# Patient Record
Sex: MALE | Race: WHITE | Employment: OTHER | URBAN - METROPOLITAN AREA
[De-identification: names, ages, dates, MRNs, and addresses within clinical notes are randomized per-mention and may not be internally consistent; named-entity substitution may affect disease eponyms.]

---

## 2017-09-12 ENCOUNTER — APPOINTMENT (OUTPATIENT)
Dept: CT IMAGING | Facility: HOSPITAL | Age: 62
DRG: 669 | End: 2017-09-12
Attending: EMERGENCY MEDICINE
Payer: COMMERCIAL

## 2017-09-12 ENCOUNTER — HOSPITAL ENCOUNTER (INPATIENT)
Facility: HOSPITAL | Age: 62
LOS: 2 days | Discharge: HOME | DRG: 669 | End: 2017-09-14
Attending: EMERGENCY MEDICINE | Admitting: HOSPITALIST
Payer: COMMERCIAL

## 2017-09-12 ENCOUNTER — APPOINTMENT (OUTPATIENT)
Dept: GENERAL RADIOLOGY | Facility: HOSPITAL | Age: 62
DRG: 669 | End: 2017-09-12
Attending: EMERGENCY MEDICINE
Payer: COMMERCIAL

## 2017-09-12 ENCOUNTER — NURSE TRIAGE (OUTPATIENT)
Dept: OTHER | Age: 62
End: 2017-09-12

## 2017-09-12 DIAGNOSIS — D49.4 BLADDER TUMOR: ICD-10-CM

## 2017-09-12 DIAGNOSIS — N20.1 OBSTRUCTION OF LEFT URETEROPELVIC JUNCTION (UPJ) DUE TO STONE: ICD-10-CM

## 2017-09-12 DIAGNOSIS — N13.30 HYDRONEPHROSIS, LEFT: ICD-10-CM

## 2017-09-12 DIAGNOSIS — N20.0 NEPHROLITHIASIS: Primary | ICD-10-CM

## 2017-09-12 LAB
ANION GAP SERPL CALC-SCNC: 11 MMOL/L (ref 0–18)
BACTERIA UR QL AUTO: NEGATIVE /HPF
BASOPHILS # BLD: 0.1 K/UL (ref 0–0.2)
BASOPHILS NFR BLD: 1 %
BILIRUB UR QL: NEGATIVE
BUN SERPL-MCNC: 13 MG/DL (ref 8–20)
BUN/CREAT SERPL: 16.3 (ref 10–20)
CALCIUM SERPL-MCNC: 9.1 MG/DL (ref 8.5–10.5)
CHLORIDE SERPL-SCNC: 102 MMOL/L (ref 95–110)
CO2 SERPL-SCNC: 26 MMOL/L (ref 22–32)
COLOR UR: YELLOW
CREAT SERPL-MCNC: 0.8 MG/DL (ref 0.5–1.5)
EOSINOPHIL # BLD: 0 K/UL (ref 0–0.7)
EOSINOPHIL NFR BLD: 0 %
ERYTHROCYTE [DISTWIDTH] IN BLOOD BY AUTOMATED COUNT: 13.1 % (ref 11–15)
GLUCOSE SERPL-MCNC: 109 MG/DL (ref 70–99)
GLUCOSE UR-MCNC: NEGATIVE MG/DL
HCT VFR BLD AUTO: 46.6 % (ref 41–52)
HGB BLD-MCNC: 15.5 G/DL (ref 13.5–17.5)
LEUKOCYTE ESTERASE UR QL STRIP.AUTO: NEGATIVE
LYMPHOCYTES # BLD: 1.6 K/UL (ref 1–4)
LYMPHOCYTES NFR BLD: 12 %
MCH RBC QN AUTO: 31.1 PG (ref 27–32)
MCHC RBC AUTO-ENTMCNC: 33.1 G/DL (ref 32–37)
MCV RBC AUTO: 93.8 FL (ref 80–100)
MONOCYTES # BLD: 0.7 K/UL (ref 0–1)
MONOCYTES NFR BLD: 5 %
NEUTROPHILS # BLD AUTO: 11 K/UL (ref 1.8–7.7)
NEUTROPHILS NFR BLD: 82 %
NITRITE UR QL STRIP.AUTO: NEGATIVE
OSMOLALITY UR CALC.SUM OF ELEC: 289 MOSM/KG (ref 275–295)
PH UR: 5 [PH] (ref 5–8)
PLATELET # BLD AUTO: 242 K/UL (ref 140–400)
PMV BLD AUTO: 8.3 FL (ref 7.4–10.3)
POTASSIUM SERPL-SCNC: 4.1 MMOL/L (ref 3.3–5.1)
PROT UR-MCNC: NEGATIVE MG/DL
RBC # BLD AUTO: 4.97 M/UL (ref 4.5–5.9)
RBC #/AREA URNS AUTO: 59 /HPF
SODIUM SERPL-SCNC: 139 MMOL/L (ref 136–144)
SP GR UR STRIP: 1.03 (ref 1–1.03)
URATE SERPL-MCNC: 5 MG/DL (ref 3.3–8.7)
UROBILINOGEN UR STRIP-ACNC: <2
VIT C UR-MCNC: NEGATIVE MG/DL
WBC # BLD AUTO: 13.5 K/UL (ref 4–11)
WBC #/AREA URNS AUTO: 2 /HPF

## 2017-09-12 PROCEDURE — 99223 1ST HOSP IP/OBS HIGH 75: CPT | Performed by: HOSPITALIST

## 2017-09-12 PROCEDURE — 74000 XR ABDOMEN (KUB) (1 AP VIEW)  (CPT=74000): CPT | Performed by: EMERGENCY MEDICINE

## 2017-09-12 PROCEDURE — 74177 CT ABD & PELVIS W/CONTRAST: CPT | Performed by: EMERGENCY MEDICINE

## 2017-09-12 RX ORDER — KETOROLAC TROMETHAMINE 15 MG/ML
15 INJECTION, SOLUTION INTRAMUSCULAR; INTRAVENOUS ONCE
Status: DISCONTINUED | OUTPATIENT
Start: 2017-09-12 | End: 2017-09-12

## 2017-09-12 RX ORDER — ACETAMINOPHEN 325 MG/1
650 TABLET ORAL EVERY 6 HOURS PRN
Status: DISCONTINUED | OUTPATIENT
Start: 2017-09-12 | End: 2017-09-14

## 2017-09-12 RX ORDER — SODIUM CHLORIDE 9 MG/ML
INJECTION, SOLUTION INTRAVENOUS CONTINUOUS
Status: ACTIVE | OUTPATIENT
Start: 2017-09-12 | End: 2017-09-13

## 2017-09-12 RX ORDER — KETOROLAC TROMETHAMINE 30 MG/ML
30 INJECTION, SOLUTION INTRAMUSCULAR; INTRAVENOUS EVERY 6 HOURS PRN
Status: DISCONTINUED | OUTPATIENT
Start: 2017-09-12 | End: 2017-09-14

## 2017-09-12 RX ORDER — KETOROLAC TROMETHAMINE 15 MG/ML
INJECTION, SOLUTION INTRAMUSCULAR; INTRAVENOUS
Status: COMPLETED
Start: 2017-09-12 | End: 2017-09-12

## 2017-09-12 RX ORDER — MORPHINE SULFATE 4 MG/ML
4 INJECTION, SOLUTION INTRAMUSCULAR; INTRAVENOUS EVERY 2 HOUR PRN
Status: DISCONTINUED | OUTPATIENT
Start: 2017-09-12 | End: 2017-09-14

## 2017-09-12 RX ORDER — SODIUM CHLORIDE 9 MG/ML
INJECTION, SOLUTION INTRAVENOUS CONTINUOUS
Status: DISCONTINUED | OUTPATIENT
Start: 2017-09-12 | End: 2017-09-14

## 2017-09-12 RX ORDER — KETOROLAC TROMETHAMINE 15 MG/ML
15 INJECTION, SOLUTION INTRAMUSCULAR; INTRAVENOUS ONCE
Status: COMPLETED | OUTPATIENT
Start: 2017-09-12 | End: 2017-09-12

## 2017-09-12 RX ORDER — ONDANSETRON 2 MG/ML
4 INJECTION INTRAMUSCULAR; INTRAVENOUS EVERY 4 HOURS PRN
Status: DISCONTINUED | OUTPATIENT
Start: 2017-09-12 | End: 2017-09-14

## 2017-09-12 RX ORDER — ONDANSETRON 2 MG/ML
4 INJECTION INTRAMUSCULAR; INTRAVENOUS EVERY 6 HOURS PRN
Status: DISCONTINUED | OUTPATIENT
Start: 2017-09-12 | End: 2017-09-14

## 2017-09-12 RX ORDER — MORPHINE SULFATE 4 MG/ML
4 INJECTION, SOLUTION INTRAMUSCULAR; INTRAVENOUS ONCE
Status: COMPLETED | OUTPATIENT
Start: 2017-09-12 | End: 2017-09-12

## 2017-09-12 RX ORDER — MORPHINE SULFATE 2 MG/ML
2 INJECTION, SOLUTION INTRAMUSCULAR; INTRAVENOUS EVERY 2 HOUR PRN
Status: DISCONTINUED | OUTPATIENT
Start: 2017-09-12 | End: 2017-09-14

## 2017-09-12 RX ORDER — ALFUZOSIN HYDROCHLORIDE 10 MG/1
10 TABLET, EXTENDED RELEASE ORAL
Status: DISCONTINUED | OUTPATIENT
Start: 2017-09-13 | End: 2017-09-14

## 2017-09-12 RX ORDER — HYDROMORPHONE HYDROCHLORIDE 1 MG/ML
0.5 INJECTION, SOLUTION INTRAMUSCULAR; INTRAVENOUS; SUBCUTANEOUS EVERY 30 MIN PRN
Status: ACTIVE | OUTPATIENT
Start: 2017-09-12 | End: 2017-09-13

## 2017-09-12 NOTE — ED PROVIDER NOTES
Patient Seen in: [facility] AND [facility] Emergency Department    History   Patient presents with:  Abdomen/Flank Pain (GI/)    Stated Complaint: abdominal pain     HPI    59 yo M with PMH pre-DM presenting with four days of initially intermittent and nearly re All other systems reviewed and negative except as noted above. PSFH elements reviewed from today and agreed except as otherwise stated in HPI.     Physical Exam   ED Triage Vitals [09/12/17 1656]  BP: 154/88  Pulse: 60  Resp: 16  Temp: 98.6 °F (37 °C GOLD   RAINBOW DRAW LAVENDER TALL (BNP)     Ct Abdomen Pelvis Iv Contrast, No Oral (er)    Result Date: 9/12/2017  PROCEDURE: CT ABDOMEN PELVIS IV CONTRAST NO ORAL (ER)  COMPARISON: None.   INDICATIONS: C/o L lower abdominal discomfort intermittent x4-5 day right pedicle of L5. LUNG BASES: There is mild bibasilar atelectasis.    Dictated by (CST): Tommy Mancilla MD on 9/12/2017 at 20:05     Approved by (CST): Tommy Mancilla MD on 9/12/2017 at 20:10          CONCLUSION: 1.3 cm left renal pelvis calculus with severe

## 2017-09-12 NOTE — TELEPHONE ENCOUNTER
Action Requested: Summary for Provider     []  Critical Lab, Recommendations Needed  [] Need Additional Advice  [x]   FYI    []   Need Orders  [] Need Medications Sent to Pharmacy  []  Other     SUMMARY: Sent to ER, because of acute left quadrant abdominal

## 2017-09-12 NOTE — ED INITIAL ASSESSMENT (HPI)
C/o L lower abdominal discomfort intermittent x4-5 days associated with generalized fatigue and irregular bowel movements, denies vomiting or fevers.

## 2017-09-13 ENCOUNTER — HOSPITAL ENCOUNTER (OUTPATIENT)
Dept: GENERAL RADIOLOGY | Facility: HOSPITAL | Age: 62
DRG: 669 | End: 2017-09-13
Attending: UROLOGY
Payer: COMMERCIAL

## 2017-09-13 ENCOUNTER — SURGERY (OUTPATIENT)
Age: 62
End: 2017-09-13

## 2017-09-13 ENCOUNTER — ANESTHESIA EVENT (OUTPATIENT)
Dept: SURGERY | Facility: HOSPITAL | Age: 62
DRG: 669 | End: 2017-09-13
Payer: COMMERCIAL

## 2017-09-13 ENCOUNTER — ANESTHESIA (OUTPATIENT)
Dept: SURGERY | Facility: HOSPITAL | Age: 62
DRG: 669 | End: 2017-09-13
Payer: COMMERCIAL

## 2017-09-13 LAB
ANION GAP SERPL CALC-SCNC: 8 MMOL/L (ref 0–18)
BUN SERPL-MCNC: 9 MG/DL (ref 8–20)
BUN/CREAT SERPL: 9.3 (ref 10–20)
CALCIUM SERPL-MCNC: 8.1 MG/DL (ref 8.5–10.5)
CHLORIDE SERPL-SCNC: 107 MMOL/L (ref 95–110)
CO2 SERPL-SCNC: 24 MMOL/L (ref 22–32)
CREAT SERPL-MCNC: 0.97 MG/DL (ref 0.5–1.5)
GLUCOSE SERPL-MCNC: 93 MG/DL (ref 70–99)
OSMOLALITY UR CALC.SUM OF ELEC: 286 MOSM/KG (ref 275–295)
POTASSIUM SERPL-SCNC: 3.5 MMOL/L (ref 3.3–5.1)
PSA SERPL-MCNC: 0.7 NG/ML (ref 0–4)
SODIUM SERPL-SCNC: 139 MMOL/L (ref 136–144)

## 2017-09-13 PROCEDURE — 0TBB8ZZ EXCISION OF BLADDER, VIA NATURAL OR ARTIFICIAL OPENING ENDOSCOPIC: ICD-10-PCS | Performed by: UROLOGY

## 2017-09-13 PROCEDURE — 52234 CYSTOSCOPY AND TREATMENT: CPT | Performed by: UROLOGY

## 2017-09-13 PROCEDURE — 0T7D8ZZ DILATION OF URETHRA, VIA NATURAL OR ARTIFICIAL OPENING ENDOSCOPIC: ICD-10-PCS | Performed by: UROLOGY

## 2017-09-13 PROCEDURE — 0T778DZ DILATION OF LEFT URETER WITH INTRALUMINAL DEVICE, VIA NATURAL OR ARTIFICIAL OPENING ENDOSCOPIC: ICD-10-PCS | Performed by: UROLOGY

## 2017-09-13 PROCEDURE — 52332 CYSTOSCOPY AND TREATMENT: CPT | Performed by: UROLOGY

## 2017-09-13 PROCEDURE — 0WHR8YZ INSERTION OF OTHER DEVICE INTO GENITOURINARY TRACT, VIA NATURAL OR ARTIFICIAL OPENING ENDOSCOPIC: ICD-10-PCS | Performed by: UROLOGY

## 2017-09-13 PROCEDURE — BT1B1ZZ FLUOROSCOPY OF BLADDER AND URETHRA USING LOW OSMOLAR CONTRAST: ICD-10-PCS | Performed by: UROLOGY

## 2017-09-13 PROCEDURE — 74420 UROGRAPHY RTRGR +-KUB: CPT | Performed by: UROLOGY

## 2017-09-13 PROCEDURE — 99233 SBSQ HOSP IP/OBS HIGH 50: CPT | Performed by: HOSPITALIST

## 2017-09-13 PROCEDURE — 99255 IP/OBS CONSLTJ NEW/EST HI 80: CPT | Performed by: UROLOGY

## 2017-09-13 DEVICE — STENT URET 6F 26CM WO GW INL: Type: IMPLANTABLE DEVICE | Site: URETER | Status: FUNCTIONAL

## 2017-09-13 RX ORDER — MAGNESIUM HYDROXIDE 1200 MG/15ML
3000 LIQUID ORAL CONTINUOUS
Status: CANCELLED | OUTPATIENT
Start: 2017-09-13

## 2017-09-13 RX ORDER — HALOPERIDOL 5 MG/ML
0.25 INJECTION INTRAMUSCULAR ONCE AS NEEDED
Status: DISCONTINUED | OUTPATIENT
Start: 2017-09-13 | End: 2017-09-13 | Stop reason: HOSPADM

## 2017-09-13 RX ORDER — ONDANSETRON 2 MG/ML
4 INJECTION INTRAMUSCULAR; INTRAVENOUS ONCE AS NEEDED
Status: DISCONTINUED | OUTPATIENT
Start: 2017-09-13 | End: 2017-09-13 | Stop reason: HOSPADM

## 2017-09-13 RX ORDER — LIDOCAINE HYDROCHLORIDE 10 MG/ML
INJECTION, SOLUTION EPIDURAL; INFILTRATION; INTRACAUDAL; PERINEURAL AS NEEDED
Status: DISCONTINUED | OUTPATIENT
Start: 2017-09-13 | End: 2017-09-13 | Stop reason: SURG

## 2017-09-13 RX ORDER — LIDOCAINE HYDROCHLORIDE 20 MG/ML
JELLY TOPICAL AS NEEDED
Status: DISCONTINUED | OUTPATIENT
Start: 2017-09-13 | End: 2017-09-13 | Stop reason: HOSPADM

## 2017-09-13 RX ORDER — ONDANSETRON 2 MG/ML
INJECTION INTRAMUSCULAR; INTRAVENOUS AS NEEDED
Status: DISCONTINUED | OUTPATIENT
Start: 2017-09-13 | End: 2017-09-13 | Stop reason: SURG

## 2017-09-13 RX ORDER — DEXAMETHASONE SODIUM PHOSPHATE 4 MG/ML
VIAL (ML) INJECTION AS NEEDED
Status: DISCONTINUED | OUTPATIENT
Start: 2017-09-13 | End: 2017-09-13 | Stop reason: SURG

## 2017-09-13 RX ORDER — NALOXONE HYDROCHLORIDE 0.4 MG/ML
80 INJECTION, SOLUTION INTRAMUSCULAR; INTRAVENOUS; SUBCUTANEOUS AS NEEDED
Status: DISCONTINUED | OUTPATIENT
Start: 2017-09-13 | End: 2017-09-13 | Stop reason: HOSPADM

## 2017-09-13 RX ORDER — HYDROCODONE BITARTRATE AND ACETAMINOPHEN 5; 325 MG/1; MG/1
2 TABLET ORAL AS NEEDED
Status: DISCONTINUED | OUTPATIENT
Start: 2017-09-13 | End: 2017-09-13 | Stop reason: HOSPADM

## 2017-09-13 RX ORDER — SODIUM CHLORIDE, SODIUM LACTATE, POTASSIUM CHLORIDE, CALCIUM CHLORIDE 600; 310; 30; 20 MG/100ML; MG/100ML; MG/100ML; MG/100ML
INJECTION, SOLUTION INTRAVENOUS CONTINUOUS
Status: DISCONTINUED | OUTPATIENT
Start: 2017-09-13 | End: 2017-09-14

## 2017-09-13 RX ORDER — GENTAMICIN SULFATE 40 MG/ML
INJECTION, SOLUTION INTRAMUSCULAR; INTRAVENOUS AS NEEDED
Status: DISCONTINUED | OUTPATIENT
Start: 2017-09-13 | End: 2017-09-13 | Stop reason: HOSPADM

## 2017-09-13 RX ORDER — CEFTRIAXONE 1 G/1
INJECTION, POWDER, FOR SOLUTION INTRAMUSCULAR; INTRAVENOUS AS NEEDED
Status: DISCONTINUED | OUTPATIENT
Start: 2017-09-13 | End: 2017-09-13 | Stop reason: SURG

## 2017-09-13 RX ORDER — HYDROCODONE BITARTRATE AND ACETAMINOPHEN 5; 325 MG/1; MG/1
1 TABLET ORAL AS NEEDED
Status: DISCONTINUED | OUTPATIENT
Start: 2017-09-13 | End: 2017-09-13 | Stop reason: HOSPADM

## 2017-09-13 RX ADMIN — LIDOCAINE HYDROCHLORIDE 50 MG: 10 INJECTION, SOLUTION EPIDURAL; INFILTRATION; INTRACAUDAL; PERINEURAL at 17:08:00

## 2017-09-13 RX ADMIN — CEFTRIAXONE 1 G: 1 INJECTION, POWDER, FOR SOLUTION INTRAMUSCULAR; INTRAVENOUS at 17:05:00

## 2017-09-13 RX ADMIN — ONDANSETRON 4 MG: 2 INJECTION INTRAMUSCULAR; INTRAVENOUS at 17:36:00

## 2017-09-13 RX ADMIN — DEXAMETHASONE SODIUM PHOSPHATE 4 MG: 4 MG/ML VIAL (ML) INJECTION at 17:36:00

## 2017-09-13 NOTE — PROGRESS NOTES
[facility] HOSPITALIST  Progress Note     Vincenzo Martin Patient Status:  Inpatient    1955 MRN [id number]   Location [facility] 5SW/SE Attending Travis Campos MD   Hosp Day # 1 PCP Myra Gray DO     Chief Complaint: Abd pain    S: Alben Desanctis hydronephrosis. Hepatic steatosis without focal lesion.           Medications:   • Alfuzosin HCl ER  10 mg Oral Daily with breakfast     ondansetron HCl, ondansetron HCl, morphINE sulfate **OR** morphINE sulfate, acetaminophen, ketorolac (TORADOL) injectio

## 2017-09-13 NOTE — CONSULTS
[facility]    Report of Consultation    Vic Isabel Patient Status:  Inpatient    1955 MRN [id number]   Location [facility] 5SW/SE Attending Michael Hart MD   Hosp Day # 1 PCP Bhupendra Molina DO     Date of Admission: nocturia 2 times a night; stream not strong but \"moderate\". He feels like he empties his bladder. He has sleep apnea but does not sleep apnea machine. Prior to this admission, not on any prostate or bladder medications.       CHART REVIEW --  Patient s Smoking status: Current Some Day Smoker                                                    Packs/day: 0.00      Years: 0.00         Types: Cigars  Smokeless tobacco: Never Used                      Comment: Per patient 2 per year  Alcohol use:  Yes inappropriate interaction and psychiatric symptoms  Respiratory:  Negative for cough, dyspnea and wheezing    PHYSICAL EXAM:   Constitutional: appears well hydrated alert and responsive no acute distress noted; moderately overweight  Neurological: Oriented clubbing; edema--none    Laboratory Data:    PSA Results (last three years):     Recent Labs   09/26/16  1051   TOTPSASCREEN 0.7       Recent Labs   09/26/16  1051 09/12/17  1706 09/13/17  0648   BUN 11 13 9   CREATSERUM 0.81 0.80 0.97   BUNCREA 13.6 16.3 fatigue  TECHNIQUE: CT images of the abdomen and pelvis were obtained with non-ionic intravenous contrast material.  Automated exposure control for dose reduction was used. Adjustment of the mA and/or kV was done based on the patient's size.  Use of iterati steatosis without focal lesion.                EKG: 10/24/16  Sinus Rhythm  WITHIN NORMAL LIMITS  No previous ECGs available  Electronically signed on 10/24/2016 at 21:19 by Eric Lewis DO    Review of previous records:   Patient states that he had 4  pr explained treatment time such as starting long-term epidural or finasteride and he wants to observe the BPH. 5.  Microhematuria; on urinalysis 59 RBC, WBC 2.   Microhematuria probably from the calculus, nonetheless, need to exclude possibility of a bladd left kidney. RECOMMENDATIONS AND TREATMENT PLAN      1. Blood draw for PSA  2.   Sequential compression boots  3.  N.p.o.  4.  Cystoscopy, left retrograde pyelogram x-ray, possible stone manipulation, insertion of left ureteral stent, possible biopsy of

## 2017-09-13 NOTE — DOWNTIME EVENT NOTE
The EMR was down for 2 hours on 9/13/2017.     The following information was re-entered into the system by Rommel AGARWAL: [facility]    Rommel AGARWAL  9/13/2017

## 2017-09-13 NOTE — PLAN OF CARE
Problem: Patient/Family Goals  Goal: Patient/Family Long Term Goal  Patient's Long Term Goal: To be discharged home.     Interventions:  - Monitor VS  - Assess for pain  - Ensure pt comfort  - Administer medications as ordered    - See additional Care Plan indicated by assessment.  - Educate pt/family on patient safety including physical limitations  - Instruct pt to call for assistance with activity based on assessment  - Modify environment to reduce risk of injury  - Provide assistive devices as appropriat socks on, call light within reach, all needs met. Pt knows they are NPO and are only allowed ice chips. Will continue to monitor pt status.

## 2017-09-13 NOTE — ANESTHESIA PREPROCEDURE EVALUATION
Anesthesia PreOp Note    HPI:     Winter Patiño is a 60year old male who presents for preoperative consultation requested by: Shady Brar MD    Date of Surgery: 9/12/2017 - 9/13/2017    Procedure(s):  CYSTOSCOPY RETROGRADE  CYSTOSCOPY STENT INSERT mL/hr at 09/13/17 1424   [MAR Hold] Alfuzosin HCl ER (UROXATRAL) 24 hr tab 10 mg 10 mg Oral Daily with breakfast Belle Garnett MD 10 mg at 09/13/17 1005     No current [facility]-ordered outpatient prescriptions on file.     No Known Allergies    Family Histor 98.1 °F (36.7 °C) 98.4 °F (36.9 °C) 98.3 °F (36.8 °C)   TempSrc:  Oral Oral Oral   SpO2: 96% 98% 95% 93%   Weight:       Height:            Anesthesia ROS/Med Hx and Physical Exam     Patient summary reviewed and Nursing notes reviewed    Airway   [place]

## 2017-09-13 NOTE — ANESTHESIA POSTPROCEDURE EVALUATION
Patient: Jimmy Chu    Procedure Summary     Date:  09/13/17 Room / Location:  [facility] OR  / [facility] OR    Anesthesia Start:  6456 Anesthesia Stop:      Procedures:       CYSTOSCOPY RETROGRADE (Left Ureter)      CYSTOSCOPY STENT INSERTION (Left ) D

## 2017-09-13 NOTE — H&P
[address] Patient Status:  Emergency    1955 MRN [id number]   Location [address] Attending Marisol Cao MD   [facility] Day # 0 PCP Enmanuel Dumont, DO     Date:   Reported? Taking? cetirizine 10 MG Oral Tab Taking  Yes Yes   Sig: Take 10 mg by mouth daily. ibuprofen 800 MG Oral Tab Taking  Yes Yes   Sig: Take 800 mg by mouth every 6 (six) hours as needed for Pain.       Facility-Administered Medications: None CREATSERUM 0.80 09/12/2017   BUN 13 09/12/2017    09/12/2017   K 4.1 09/12/2017    09/12/2017   CO2 26 09/12/2017    09/12/2017   CA 9.1 09/12/2017       Imaging:  No exam resulted this encounter.     Assessment and Plan:    Urolithiasi

## 2017-09-13 NOTE — PLAN OF CARE
Problem: Patient/Family Goals  Goal: Patient/Family Long Term Goal  Patient's Long Term Goal: To be discharged home.     Interventions:  - Monitor VS  - Assess for pain  - Ensure pt comfort  - Administer medications as ordered    - See additional Care Plan limitations  - Instruct pt to call for assistance with activity based on assessment  - Modify environment to reduce risk of injury  - Provide assistive devices as appropriate  - Consider OT/PT consult to assist with strengthening/mobility  - Encourage toil

## 2017-09-13 NOTE — TELEPHONE ENCOUNTER
Patient has been admitted to [facility] for hydronephrosis/nephrolithiasis.

## 2017-09-13 NOTE — BRIEF OP NOTE
[facility] POST ANESTHESIA CARE UNIT  Brief Op Note       Patients Name: Rupesh Forman  Attending Physician: Jyoti Flores MD  Operating Physician: Andres Mendoza MD  CSN: [id number]     Location:  OR  MRN: [id number]    Date of Birth: [birth year]

## 2017-09-14 ENCOUNTER — TELEPHONE (OUTPATIENT)
Dept: SURGERY | Facility: CLINIC | Age: 62
End: 2017-09-14

## 2017-09-14 VITALS
RESPIRATION RATE: 18 BRPM | BODY MASS INDEX: 33.94 KG/M2 | DIASTOLIC BLOOD PRESSURE: 79 MMHG | SYSTOLIC BLOOD PRESSURE: 139 MMHG | HEART RATE: 62 BPM | OXYGEN SATURATION: 95 % | TEMPERATURE: 98 F | HEIGHT: 75 IN | WEIGHT: 273 LBS

## 2017-09-14 LAB
ANION GAP SERPL CALC-SCNC: 6 MMOL/L (ref 0–18)
BUN SERPL-MCNC: 11 MG/DL (ref 8–20)
BUN/CREAT SERPL: 13.1 (ref 10–20)
CALCIUM SERPL-MCNC: 8.1 MG/DL (ref 8.5–10.5)
CHLORIDE SERPL-SCNC: 108 MMOL/L (ref 95–110)
CO2 SERPL-SCNC: 26 MMOL/L (ref 22–32)
CREAT SERPL-MCNC: 0.84 MG/DL (ref 0.5–1.5)
ERYTHROCYTE [DISTWIDTH] IN BLOOD BY AUTOMATED COUNT: 12.9 % (ref 11–15)
GLUCOSE SERPL-MCNC: 112 MG/DL (ref 70–99)
HCT VFR BLD AUTO: 42.1 % (ref 41–52)
HGB BLD-MCNC: 14.5 G/DL (ref 13.5–17.5)
MCH RBC QN AUTO: 32 PG (ref 27–32)
MCHC RBC AUTO-ENTMCNC: 34.4 G/DL (ref 32–37)
MCV RBC AUTO: 93 FL (ref 80–100)
OSMOLALITY UR CALC.SUM OF ELEC: 290 MOSM/KG (ref 275–295)
PLATELET # BLD AUTO: 212 K/UL (ref 140–400)
PMV BLD AUTO: 7.8 FL (ref 7.4–10.3)
POTASSIUM SERPL-SCNC: 4 MMOL/L (ref 3.3–5.1)
RBC # BLD AUTO: 4.53 M/UL (ref 4.5–5.9)
SODIUM SERPL-SCNC: 140 MMOL/L (ref 136–144)
WBC # BLD AUTO: 9.4 K/UL (ref 4–11)

## 2017-09-14 PROCEDURE — 99024 POSTOP FOLLOW-UP VISIT: CPT | Performed by: UROLOGY

## 2017-09-14 PROCEDURE — 99239 HOSP IP/OBS DSCHRG MGMT >30: CPT | Performed by: HOSPITALIST

## 2017-09-14 RX ORDER — CEFADROXIL 500 MG/1
500 CAPSULE ORAL 2 TIMES DAILY
Qty: 14 CAPSULE | Refills: 0 | Status: SHIPPED | OUTPATIENT
Start: 2017-09-14 | End: 2017-09-21

## 2017-09-14 RX ORDER — ALFUZOSIN HYDROCHLORIDE 10 MG/1
10 TABLET, EXTENDED RELEASE ORAL
Qty: 30 TABLET | Refills: 0 | Status: SHIPPED | OUTPATIENT
Start: 2017-09-15 | End: 2017-10-27

## 2017-09-14 NOTE — OPERATIVE REPORT
[facility]    PATIENT'S NAME: JESSIE BARBOSA   ATTENDING PHYSICIAN: Shyam Goezt MD   OPERATING PHYSICIAN: Rema Santos MD   PATIENT ACCOUNT#:   [id number]    LOCATION:  [address] [place] [address] Drive #:   [id number]       DATE OF BI lateral to the right ureteral orifice and does not involve the right ureteral orifice; it is removed and deeper bite of bladder under the tumor is removed. That area is fulgurated. There were no other bladder tumors.   Noteworthy is that the patient has a difficulty inserting the 22-Greek cystoscope sheath because of diffusely narrowed urethra and I dilated the urethra up to a size 22-Greek and then was able to insert the 22-Greek cystoscope.   I inspected the urethra and prostatic urethra, bladder neck a 18:09:43  t: 09/13/2017 21:21:01  Job [id number]  [facility]/    cc:  Shruti Pruitt MD

## 2017-09-14 NOTE — PLAN OF CARE
Problem: Patient/Family Goals  Goal: Patient/Family Long Term Goal  Patient's Long Term Goal: To be discharged home.     Interventions:  - Monitor VS  - Assess for pain  - Ensure pt comfort  - Administer medications as ordered    - See additional Care Plan call for assistance with activity based on assessment  - Modify environment to reduce risk of injury  - Provide assistive devices as appropriate  - Consider OT/PT consult to assist with strengthening/mobility  - Encourage toileting schedule   Outcome: Prog

## 2017-09-14 NOTE — RESPIRATORY THERAPY NOTE
JOHN ASSESSMENT:    Pt does not have a previous diagnosis of JOHN. Pt does not routinely use a CPAP device at home.  This pt is not suspected to be at high risk for JOHN

## 2017-09-14 NOTE — TELEPHONE ENCOUNTER
Urology update    9/13/17 I performed urology consult [facility] and also performed cystoscopy with insertion of left ureteral stent and unexpected removal of bladder tumor--please see epic.     Following are discharge instructions given to the patient today 9/14/

## 2017-09-14 NOTE — DISCHARGE SUMMARY
[facility] HOSPITALIST  DISCHARGE SUMMARY     Lulu Haq Patient Status:  Inpatient    1955 MRN [id number]   Location [facility] 5SW/SE Attending Laz Velarde MD   Hosp Day # 2 PCP Aric Pepe DO     Date of Admission: 2017  D resolve.       Procedures/Imaging during hospitalization:   • CT abd pelvis  Surgical Procedures     Case IDs Date Procedure Surgeon Location Status    [id number] 9/13/17 CYSTOSCOPY RETROGRADE Jim Rizo MD [facility] OR [facility]      •       Things to follow °C)] 98.3 °F (36.8 °C)  Pulse:  [62-78] 62  Resp:  [11-18] 18  BP: (126-139)/(74-94) 139/79    Physical Exam:    General: No acute distress. Respiratory: Clear to auscultation bilaterally. No wheezes. No rhonchi.   Cardiovascular: S1, S2. Regular rate and

## 2017-09-14 NOTE — PROGRESS NOTES
Ashley Guillermo is a 60year old male. HPI:   Patient presents with:  Abdomen/Flank Pain (GI/)      History provided by patient.     Large left obstructing UPJ calculus ---  Started causing pain 9/8/17; 9/12/17 went to [facility] ER where evaluated and had CT Facility-Administered Medications:   •  lactated ringers infusion, , Intravenous, Continuous  •  ondansetron HCl (ZOFRAN) injection 4 mg, 4 mg, Intravenous, Q4H PRN  •  ondansetron HCl (ZOFRAN) injection 4 mg, 4 mg, Intravenous, Q6H PRN  •  morphINE sulfat ----------  HGB   Date Value Ref Range Status   09/14/2017 14.5 13.5 - 17.5 g/dL Final   ----------  HEMOGLOBIN (L)   Date Value Ref Range Status   09/26/2016 15.4 13.5 - 17.5 G/DL Final   ----------  HCT   Date Value Ref Range Status   09/14/2017 42.1 4 contrast material.  Automated exposure control for dose reduction was used. Adjustment of the mA and/or kV was done based on the patient's size. Use of iterative reconstruction technique for dose reduction was used.    FINDINGS:  LIVER: Diffuse low attenuat kidney stone episodes with last one being 5-6 years ago; episodes consisted of persistent flank pain and resolved on its own each time; patient never saw the stone, and prior to this admission, never had kidney stone surgery.   Patient reports history of py that I will notify him when I get the results but we will discuss further treatment during office visit which I recommend next week and he agrees to that.     2.  Large left 1.3 cm calculus left ureteropelvic junction--  It was causing left hydronephrosis a taking aspirin or NSAIDs (medications such as Advil, Motrin, Aleve, ibuprofen); Tylenol or generic Tylenol = acetaminophen is fine    6. If constipated, take over-the-counter generic MiraLAX powder or over-the-counter milk of magnesia    7.   I agree with

## 2017-09-15 NOTE — PAYOR COMM NOTE
--------------  ADMISSION REVIEW     Payor: Rhys WILLIAMSON/YUMI  Subscriber #:  [id number]  Authorization Number: [id number]    Admit date: 9/12/17  Admit time: 2226       Patient Seen in: [facility] Emergency Department    History   Patient presents Impression:  Nephrolithiasis  (primary encounter diagnosis)  Hydronephrosis, left    Disposition:  Admit            History & Physical      Date of Admission:  9/12/2017    Chief Complaint:  Patient presents with:  Abdomen/Flank Pain (GI/)      History o oriented. Diffuse skin problem:  None. Eye:  Pupils are equal, round and reactive to light, extraocular movements are intact, Normal conjunctiva. HENT:  Normocephalic, oral mucosa is moist.  Head:  Normocephalic, atraumatic.   Neck:  Supple, non-tender, STATUS  Full          9/13/17  Chief Complaint: Abd pain     S: Patient still has L flank pain Toradol is helping better than morphine. No N/V today no F/C.  No CP or SOB.      Review of Systems:   10 point ROS completed and was negative, except for pertine obstructive uropathy  - Large 1.3 cm left renal pelvis stone with associated severe hydronephrosis  - cont flomax, IVF and pain control.   - Urology following.   - Plans for cystoscopy with L retrograde pyelogram stone manipulation insertion of L ureteral

## 2017-09-21 ENCOUNTER — OFFICE VISIT (OUTPATIENT)
Dept: SURGERY | Facility: CLINIC | Age: 62
End: 2017-09-21

## 2017-09-21 VITALS
BODY MASS INDEX: 33.98 KG/M2 | TEMPERATURE: 98 F | DIASTOLIC BLOOD PRESSURE: 82 MMHG | SYSTOLIC BLOOD PRESSURE: 120 MMHG | WEIGHT: 273.31 LBS | HEART RATE: 77 BPM | HEIGHT: 75 IN

## 2017-09-21 DIAGNOSIS — N40.1 BENIGN PROSTATIC HYPERPLASIA WITH URINARY FREQUENCY: ICD-10-CM

## 2017-09-21 DIAGNOSIS — K21.9 GASTROESOPHAGEAL REFLUX DISEASE, ESOPHAGITIS PRESENCE NOT SPECIFIED: ICD-10-CM

## 2017-09-21 DIAGNOSIS — C67.0 MALIGNANT NEOPLASM OF TRIGONE OF URINARY BLADDER (HCC): Primary | ICD-10-CM

## 2017-09-21 DIAGNOSIS — N13.2 HYDRONEPHROSIS WITH URINARY OBSTRUCTION DUE TO RENAL CALCULUS: ICD-10-CM

## 2017-09-21 DIAGNOSIS — R35.0 BENIGN PROSTATIC HYPERPLASIA WITH URINARY FREQUENCY: ICD-10-CM

## 2017-09-21 DIAGNOSIS — N20.0 KIDNEY STONE: ICD-10-CM

## 2017-09-21 DIAGNOSIS — N35.9 URETHRAL STRICTURE, UNSPECIFIED STRICTURE TYPE: ICD-10-CM

## 2017-09-21 PROCEDURE — 99212 OFFICE O/P EST SF 10 MIN: CPT | Performed by: UROLOGY

## 2017-09-21 PROCEDURE — 99215 OFFICE O/P EST HI 40 MIN: CPT | Performed by: UROLOGY

## 2017-09-21 NOTE — PATIENT INSTRUCTIONS
1.  Continue alfuzosin 10 mg daily for BPH/enlarged prostate urinating dysfunction and also because of temporary voiding worsening because of stent. Alfuzosin may also help you pass stone fragments after shockwave    2.   Finish course of cefadroxil antibi may help cause development of kidney stones. Understanding Bladder Cancer    The urinary system includes the kidneys, ureters, bladder, and urethra. It makes, stores, and gets rid of liquid waste called urine.  The two kidneys filter blood to collect waste rights reserved. This information is not intended as a substitute for professional medical care. Always follow your healthcare professional's instructions.

## 2017-09-23 ENCOUNTER — TELEPHONE (OUTPATIENT)
Dept: SURGERY | Facility: CLINIC | Age: 62
End: 2017-09-23

## 2017-09-23 NOTE — TELEPHONE ENCOUNTER
Surgery scheduling  I completed visit note of 9/21/17; please printed and put it in preop packet. Also please put in preop packet patient's new KUB report.   Many thanks, Dr. Denise Campoverde

## 2017-09-23 NOTE — PROGRESS NOTES
Trini May is a 60year old male. HPI:   Patient presents with:  Bladder Cancer: discuss [facility] cystoscopy and path today      History provided by patient.     NEWLY DIAGNOSED BLADDER CANCER  Bladder tumor unexpectedly found 9/13/17 during cystoscopy wi During 9/13/17 cystoscopy with removal of bladder tumor and left ureteral stent placement, ureter was diffusely narrowed calibrated to 18-20 [place]; it was dilated to 22 [place]. He denies weakening of stream.  He feels like he is emptying his bladder. Recent Labs   09/26/16  1051 09/12/17  1706 09/13/17  0648 09/14/17  0648   BUN 11 13 9 11   CREATSERUM 0.81 0.80 0.97 0.84   BUNCREA 13.6 16.3 9.3* 13.1   GFRAA >60 >60 >60 >60   GFRNAA >60 >60 >60 >60         Recent Labs   09/12/17 2024   Stan Soto PROCEDURE: XR ABDOMEN (KUB) (1 AP VIEW) (CPT=74000)  COMPARISON: None. INDICATIONS: Lower abdominal pain for 5 days,  history of kidney stones  TECHNIQUE:   Two views. FINDINGS:  BOWEL GAS PATTERN: There are no dilated loops of large or small bowel.  SOF PROCEDURE: CT ABDOMEN PELVIS IV CONTRAST NO ORAL (ER)  COMPARISON: None.   INDICATIONS: C/o L lower abdominal discomfort intermittent x4-5 days associated with  generalized fatigue  TECHNIQUE: CT images of the abdomen and pelvis were obtained with non-ionic 9/12/2017 at 20:05     Approved by (CST): Bee Oneal MD on 9/12/2017 at 20:10        CONCLUSION: 1.3 cm left renal pelvis calculus with severe left hydronephrosis. Hepatic steatosis without focal lesion.              Review of previous records:  Patient I spent 40  minutes with patient, and majority of this time was spent discussing treatment options, answering questions about treatment and coordinating care. ASSESSMENT/PLAN:   Assessment     1.   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   I explained to patient that the stone may be uric acid in nature since it appears radiolucent; such stones might be uric ac During hospital cystoscopy procedure of 9/13/17, urethra was diffusely narrowed; dilated to 22 [place]ca. Patient feels problem is stable and wants to be observed for now. RECOMMENDATIONS AND TREATMENT PLAN ---    1.   Continue alfuzosin 10 mg daily for 10.  In general, try to have a new habit of drinking lots of water in the morning and early afternoon since patient's, with a history of bladder cancer, are statistically less likely to have a recurrence of bladder cancer if there urine is more dilute    1

## 2017-09-25 ENCOUNTER — TELEPHONE (OUTPATIENT)
Dept: SURGERY | Facility: CLINIC | Age: 62
End: 2017-09-25

## 2017-09-25 NOTE — TELEPHONE ENCOUNTER
Miriam Adair would like to let PVK know that imaging report from 09/13/17 is now online to view. Please relay. Thank you.

## 2017-09-27 ENCOUNTER — APPOINTMENT (OUTPATIENT)
Dept: LAB | Facility: HOSPITAL | Age: 62
End: 2017-09-27
Attending: UROLOGY
Payer: COMMERCIAL

## 2017-09-27 ENCOUNTER — TELEPHONE (OUTPATIENT)
Dept: SURGERY | Facility: CLINIC | Age: 62
End: 2017-09-27

## 2017-09-27 ENCOUNTER — HOSPITAL ENCOUNTER (OUTPATIENT)
Dept: GENERAL RADIOLOGY | Facility: HOSPITAL | Age: 62
Discharge: HOME | End: 2017-09-27
Attending: UROLOGY
Payer: COMMERCIAL

## 2017-09-27 DIAGNOSIS — Z01.818 PREOP TESTING: ICD-10-CM

## 2017-09-27 DIAGNOSIS — N20.0 KIDNEY STONE: ICD-10-CM

## 2017-09-27 DIAGNOSIS — Z01.818 PREOP TESTING: Primary | ICD-10-CM

## 2017-09-27 PROCEDURE — 93010 ELECTROCARDIOGRAM REPORT: CPT | Performed by: UROLOGY

## 2017-09-27 PROCEDURE — 74020 XR ABDOMEN MINIMUM 2 VIEWS (CPT=74020): CPT | Performed by: UROLOGY

## 2017-09-27 PROCEDURE — 93005 ELECTROCARDIOGRAM TRACING: CPT

## 2017-09-27 NOTE — TELEPHONE ENCOUNTER
Please call patient; I put in an order for a KUB plain x-ray on September 21 and he has not had it done yet; surgery tomorrow; it could help success of tomorrow's shockwave surgery if he has a performed; milk of magnesia ideally evening before or at least

## 2017-09-27 NOTE — TELEPHONE ENCOUNTER
Patient contacted. Relayed Dr. Behzad Marte' message below to patient. Patient states he will have KUB today; Milk of Magnesia several hours before and verbalized understanding. All questions answered.

## 2017-09-28 ENCOUNTER — TELEPHONE (OUTPATIENT)
Dept: SURGERY | Facility: CLINIC | Age: 62
End: 2017-09-28

## 2017-09-28 DIAGNOSIS — N20.0 KIDNEY STONE: Primary | ICD-10-CM

## 2017-09-28 NOTE — TELEPHONE ENCOUNTER
Alessandro,    9/28/17    Administration of IV contrast, ESWL of large 1.3 cm radiolucent stone left renal pelvis  --   gen anesth @  [facility] --  Case went well.     Plan --   In 10 days kidney ultrasound ( because stone radiolucent on faina

## 2017-09-28 NOTE — TELEPHONE ENCOUNTER
Nurses,  Please call patient and advise him how to schedule renal ultrasound in 10 days;   I will enter order. He is to call day after and leave message for results and I will try to call him back that linda with results.     The following is   Summary =

## 2017-09-28 NOTE — TELEPHONE ENCOUNTER
Staff, please fax patient's EKG to Safia Riggs in [facility];   tomorrow on Thursday, 9/28/17 I will be performing shockwave lithotripsy (ESWL) and EKG report needs to be there before the procedure at 5:00.   Many thanks, Dr. Najma Hill

## 2017-09-28 NOTE — TELEPHONE ENCOUNTER
Alayna Dye, who is covering for surgery [facility].  Faxed the EKG result to Safia Riggs.

## 2017-09-29 NOTE — TELEPHONE ENCOUNTER
I spoke with pt and informed him of the instructions below and gave the central schdg. # to call andhe will call the office after having the test for results.

## 2017-10-03 ENCOUNTER — OFFICE VISIT (OUTPATIENT)
Dept: FAMILY MEDICINE CLINIC | Facility: CLINIC | Age: 62
End: 2017-10-03

## 2017-10-03 VITALS
BODY MASS INDEX: 34.32 KG/M2 | TEMPERATURE: 98 F | SYSTOLIC BLOOD PRESSURE: 117 MMHG | DIASTOLIC BLOOD PRESSURE: 76 MMHG | HEART RATE: 83 BPM | HEIGHT: 75 IN | WEIGHT: 276 LBS

## 2017-10-03 DIAGNOSIS — G47.33 OSA (OBSTRUCTIVE SLEEP APNEA): ICD-10-CM

## 2017-10-03 DIAGNOSIS — C67.9 MALIGNANT NEOPLASM OF URINARY BLADDER, UNSPECIFIED SITE (HCC): ICD-10-CM

## 2017-10-03 DIAGNOSIS — Z00.00 ROUTINE GENERAL MEDICAL EXAMINATION AT A HEALTH CARE FACILITY: Primary | ICD-10-CM

## 2017-10-03 PROCEDURE — 99396 PREV VISIT EST AGE 40-64: CPT | Performed by: FAMILY MEDICINE

## 2017-10-03 NOTE — PROGRESS NOTES
HPI:    Patient ID: Jaydon Fuller is a 60year old male.     HPI  Patient presents with:  Routine Physical  Bladder Cancer: f/u [facility] 9/13-tumor removed from cancer- was told to get colonoscopy from Urologist    Past Medical History:   Diagnosis Date   • Luis range of motion. Neck supple. Normal carotid pulses and no JVD present. Cardiovascular: Regular rhythm and normal heart sounds. Pulses:       Carotid pulses are 2+ on the right side, and 2+ on the left side.        Radial pulses are 2+ on the right bryanna

## 2017-10-10 ENCOUNTER — APPOINTMENT (OUTPATIENT)
Dept: LAB | Age: 62
End: 2017-10-10
Attending: UROLOGY
Payer: COMMERCIAL

## 2017-10-10 ENCOUNTER — HOSPITAL ENCOUNTER (OUTPATIENT)
Dept: ULTRASOUND IMAGING | Age: 62
Discharge: HOME | End: 2017-10-10
Attending: UROLOGY
Payer: COMMERCIAL

## 2017-10-10 DIAGNOSIS — Z00.00 ROUTINE GENERAL MEDICAL EXAMINATION AT A HEALTH CARE FACILITY: ICD-10-CM

## 2017-10-10 DIAGNOSIS — N20.0 KIDNEY STONE: ICD-10-CM

## 2017-10-10 PROCEDURE — 76770 US EXAM ABDO BACK WALL COMP: CPT | Performed by: UROLOGY

## 2017-10-10 PROCEDURE — 80053 COMPREHEN METABOLIC PANEL: CPT | Performed by: FAMILY MEDICINE

## 2017-10-10 PROCEDURE — 36415 COLL VENOUS BLD VENIPUNCTURE: CPT | Performed by: FAMILY MEDICINE

## 2017-10-10 PROCEDURE — 80061 LIPID PANEL: CPT | Performed by: FAMILY MEDICINE

## 2017-10-12 DIAGNOSIS — N20.0 KIDNEY STONE: Primary | ICD-10-CM

## 2017-10-17 ENCOUNTER — TELEPHONE (OUTPATIENT)
Dept: SURGERY | Facility: CLINIC | Age: 62
End: 2017-10-17

## 2017-10-17 NOTE — TELEPHONE ENCOUNTER
----- Message from Pavan Vasquez MD sent at 10/12/2017 10:55 PM CDT -----  Nurses, please call patient and help him schedule CT scan of the urinary tract (I will enter order); also review the following message with him that I sent in by \"my chart\"  =

## 2017-10-17 NOTE — TELEPHONE ENCOUNTER
----- Message from René Fernando MD sent at 10/12/2017 10:55 PM CDT -----  Nurses, please call patient and help him schedule CT scan of the urinary tract (I will enter order); also review the following message with him that I sent in by \"my chart\"  =

## 2017-10-17 NOTE — TELEPHONE ENCOUNTER
I attempted to reach pt and LM with the info below and told him to CB to clarify and questions. I left the central schdg. # also and told him to ck with his BC/BS for PA.

## 2017-10-19 NOTE — TELEPHONE ENCOUNTER
[facility] and first ck with his BC/BS if a PA is needed and let us know if it is so that we can attempt to get it authorized.

## 2017-10-25 NOTE — TELEPHONE ENCOUNTER
Phoned pt and received voice mail. [email or web address] , stating he needs to notify clinic if a prior auth is required, as advised by nurse Nneka De Leon. Clinic call back number provided.

## 2017-10-27 ENCOUNTER — APPOINTMENT (OUTPATIENT)
Dept: LAB | Facility: HOSPITAL | Age: 62
End: 2017-10-27
Attending: UROLOGY
Payer: COMMERCIAL

## 2017-10-27 ENCOUNTER — HOSPITAL ENCOUNTER (OUTPATIENT)
Dept: CT IMAGING | Facility: HOSPITAL | Age: 62
Discharge: HOME | End: 2017-10-27
Attending: UROLOGY
Payer: COMMERCIAL

## 2017-10-27 ENCOUNTER — TELEPHONE (OUTPATIENT)
Dept: SURGERY | Facility: CLINIC | Age: 62
End: 2017-10-27

## 2017-10-27 DIAGNOSIS — R30.0 DYSURIA: ICD-10-CM

## 2017-10-27 DIAGNOSIS — N20.0 KIDNEY STONE: ICD-10-CM

## 2017-10-27 DIAGNOSIS — R30.0 DYSURIA: Primary | ICD-10-CM

## 2017-10-27 PROCEDURE — 74176 CT ABD & PELVIS W/O CONTRAST: CPT | Performed by: UROLOGY

## 2017-10-27 PROCEDURE — 87086 URINE CULTURE/COLONY COUNT: CPT

## 2017-10-27 PROCEDURE — 81001 URINALYSIS AUTO W/SCOPE: CPT

## 2017-10-27 RX ORDER — CEFADROXIL 500 MG/1
CAPSULE ORAL
Qty: 20 CAPSULE | Refills: 0 | Status: SHIPPED | OUTPATIENT
Start: 2017-10-27 | End: 2017-11-30 | Stop reason: ALTCHOICE

## 2017-10-27 RX ORDER — ALFUZOSIN HYDROCHLORIDE 10 MG/1
10 TABLET, EXTENDED RELEASE ORAL
Qty: 30 TABLET | Refills: 3 | Status: SHIPPED | OUTPATIENT
Start: 2017-10-27 | End: 2018-02-27

## 2017-10-27 NOTE — TELEPHONE ENCOUNTER
Spoke with pt and read him PVK note in it's entirety. Pt states he understands all. Has already done the ct scan. Will  the antibiotic and the renewed script for alfuzosin.   Pt will call Monday re: c&s results

## 2017-10-27 NOTE — TELEPHONE ENCOUNTER
Pt came to the front uro desk asking to speak with a nurse regarding his symptoms. I called pt into the exam room and determined that he ran out of his Alfuzosin about 1 week ago that he said PVK prescribed for 30 days.  States that since then he has had a

## 2017-10-27 NOTE — TELEPHONE ENCOUNTER
Please call patient back. I agree with him getting a CT scan and also submitting urine culture.   Since patient feels that he may have an infection, I am electronically sending order for cefadroxil 500 mg twice daily antibiotic for 10 days; patient needs t

## 2017-10-30 ENCOUNTER — OFFICE VISIT (OUTPATIENT)
Dept: SURGERY | Facility: CLINIC | Age: 62
End: 2017-10-30

## 2017-10-30 ENCOUNTER — TELEPHONE (OUTPATIENT)
Dept: SURGERY | Facility: CLINIC | Age: 62
End: 2017-10-30

## 2017-10-30 VITALS
RESPIRATION RATE: 16 BRPM | TEMPERATURE: 98 F | HEIGHT: 75 IN | DIASTOLIC BLOOD PRESSURE: 78 MMHG | HEART RATE: 74 BPM | SYSTOLIC BLOOD PRESSURE: 122 MMHG | BODY MASS INDEX: 34.01 KG/M2 | WEIGHT: 273.5 LBS

## 2017-10-30 DIAGNOSIS — N40.1 BENIGN PROSTATIC HYPERPLASIA WITH URINARY FREQUENCY: ICD-10-CM

## 2017-10-30 DIAGNOSIS — Z87.442 HISTORY OF KIDNEY STONES: ICD-10-CM

## 2017-10-30 DIAGNOSIS — R35.0 BENIGN PROSTATIC HYPERPLASIA WITH URINARY FREQUENCY: ICD-10-CM

## 2017-10-30 DIAGNOSIS — N20.0 KIDNEY STONE: Primary | ICD-10-CM

## 2017-10-30 DIAGNOSIS — C67.9 MALIGNANT NEOPLASM OF URINARY BLADDER, UNSPECIFIED SITE (HCC): Primary | ICD-10-CM

## 2017-10-30 PROCEDURE — 99213 OFFICE O/P EST LOW 20 MIN: CPT | Performed by: UROLOGY

## 2017-10-30 PROCEDURE — 99212 OFFICE O/P EST SF 10 MIN: CPT | Performed by: UROLOGY

## 2017-10-30 PROCEDURE — 82365 CALCULUS SPECTROSCOPY: CPT | Performed by: UROLOGY

## 2017-10-30 NOTE — PATIENT INSTRUCTIONS
1.  CT scan 10/27/17 reports \"mild atherosclerotic vascular calcifications of the abdominal aorta\"; please review with Dr. Yuli Archer you are cholesterol profile and how closely that has to be watched in light of the CT scan findings    2.      CT shows

## 2017-10-30 NOTE — PROGRESS NOTES
HPI:    Patient ID: Chidi Knight is a 60year old male. HPI    History provided by patient.     Bladder Cancer  Bladder tumor unexpectedly found 9/13/17 during cystoscopy with left stent insertion for large obstructing 1.3 cm left UPJ stone; pathology stent.    BPH  Chronic problem. Patient not on any finasteride or dutasteride; he feels problem is stable.     Urethral stricture  During 9/13/17 cystoscopy with removal of bladder tumor and left ureteral stent placement, ureter was diffusely narrowed ceasar discharged on alfuzosin. 9/28/17  Administration of IV contrast, ESWL of large 1.3 cm radiolucent stone left renal pelvis  Under general anesthesia. Review of Systems   Constitutional: Negative for fever. HENT: Negative for voice change.     Respirato tablet Rfl: 3   cetirizine 10 MG Oral Tab Take 10 mg by mouth daily. Disp:  Rfl:    ibuprofen 800 MG Oral Tab Take 800 mg by mouth every 6 (six) hours as needed for Pain.  Disp:  Rfl:      Allergies:No Known Allergies   PHYSICAL EXAM:   Physical Exam   Cons 09/14/2017 9.4 4.0 - 11.0 K/UL Final   ----------  WHITE BLOOD COUNT (L)   Date Value Ref Range Status   09/26/2016 9.3 4.0 - 11.0 K/UL Final   ----------  HGB   Date Value Ref Range Status   09/14/2017 14.5 13.5 - 17.5 g/dL Final   ----------  HEMOGLOBI may be radiolucent. Contrast is seen within the bladder. Right inguinal fasteners.   Dictated by (CST): Rui Montano MD on 9/12/2017 at 21:39     Approved by (CST): Rui Montano MD on 9/12/2017 at 21:41        CONCLUSION: Retained contrast within the left r mass or hernia. PELVIS: No enlarged mass or adenopathy. No bladder wall thickening. Prior right inguinal hernia repair. BONES:   There is degenerative disease of the thoracic and lumbar spine. Degenerative changes are seen within the sacroiliac joints.  D answered questions on treatment; patient understands all of this and wants the cystoscopy, possible biopsy under MAC anesthesia. I fully discussed with the patient preoperative procedures and post operative care--please see instructions below.      I also d Orders Placed This Encounter      Specimen to Pathology, Tissue    Meds This Visit:  No prescriptions requested or ordered in this encounter    Imaging & Referrals:  None       ID#[id number]  By signing my name below, I, Rubens Storm,  earle reji

## 2017-10-31 ENCOUNTER — TELEPHONE (OUTPATIENT)
Dept: SURGERY | Facility: CLINIC | Age: 62
End: 2017-10-31

## 2017-10-31 NOTE — TELEPHONE ENCOUNTER
Patient seen in office scheduled for 11/9/17 @ 10:00 at [place] [facility], for cysto, remove ureteral stent, possible bladder biopsy, pre-op were discussed with patients

## 2017-11-06 DIAGNOSIS — N20.0 KIDNEY STONE: Primary | ICD-10-CM

## 2017-11-13 ENCOUNTER — OFFICE VISIT (OUTPATIENT)
Dept: SURGERY | Facility: CLINIC | Age: 62
End: 2017-11-13

## 2017-11-13 DIAGNOSIS — N63.0 BREAST MASS IN MALE: Primary | ICD-10-CM

## 2017-11-13 DIAGNOSIS — N64.4 BREAST PAIN IN MALE: ICD-10-CM

## 2017-11-13 PROCEDURE — 99212 OFFICE O/P EST SF 10 MIN: CPT | Performed by: SURGERY

## 2017-11-13 PROCEDURE — 99214 OFFICE O/P EST MOD 30 MIN: CPT | Performed by: SURGERY

## 2017-11-13 NOTE — H&P
History and Physical      Liam Deuce is a 58year old male. HPI   Patient presents with: Follow - Up: Patient here for follow up left breast mass. Last seen 10/04/2016. Recently completed treatment for bladder cancer.  S/P Right breast lumpectomy, cetirizine 10 MG Oral Tab Take 10 mg by mouth daily.  Disp:  Rfl:        ALLERGIES  No Known Allergies    SOCIAL HISTORY  Social History    Marital status:              Spouse name:                       Years of education:                 Number o sl tender, multiple B benign skin lesions, no LN, well-healed R periareolar mass. DIAGNOSTICS  Mammo/US 11/2016 = cat b, L breast probable gynecomastia, mild on R, no discrete mass, possible sebaceous cyst R 4:00 8cm FTN  R path = gynecomastia.

## 2017-11-20 ENCOUNTER — HOSPITAL ENCOUNTER (OUTPATIENT)
Dept: ULTRASOUND IMAGING | Facility: HOSPITAL | Age: 62
Discharge: HOME | End: 2017-11-20
Attending: SURGERY
Payer: COMMERCIAL

## 2017-11-20 ENCOUNTER — HOSPITAL ENCOUNTER (OUTPATIENT)
Dept: MAMMOGRAPHY | Facility: HOSPITAL | Age: 62
Discharge: HOME | End: 2017-11-20
Attending: SURGERY
Payer: COMMERCIAL

## 2017-11-20 DIAGNOSIS — N63.0 BREAST MASS IN MALE: ICD-10-CM

## 2017-11-20 DIAGNOSIS — N64.4 BREAST PAIN IN MALE: ICD-10-CM

## 2017-11-20 PROCEDURE — 77066 DX MAMMO INCL CAD BI: CPT | Performed by: SURGERY

## 2017-11-20 PROCEDURE — 76642 ULTRASOUND BREAST LIMITED: CPT | Performed by: SURGERY

## 2017-11-20 PROCEDURE — 77062 BREAST TOMOSYNTHESIS BI: CPT | Performed by: SURGERY

## 2017-11-22 ENCOUNTER — TELEPHONE (OUTPATIENT)
Dept: SURGERY | Facility: CLINIC | Age: 62
End: 2017-11-22

## 2017-11-23 NOTE — TELEPHONE ENCOUNTER
Notes Recorded by Shaw Swan MD on 11/21/2017 at 11:40 AM CST  Please notify pt of breast imaging findings: benign and stable.  He may continue to observe the breast swelling and see me in office in 6 months and modify contributing factors to gynecomast

## 2017-11-30 ENCOUNTER — TELEPHONE (OUTPATIENT)
Dept: GASTROENTEROLOGY | Facility: CLINIC | Age: 62
End: 2017-11-30

## 2017-11-30 ENCOUNTER — OFFICE VISIT (OUTPATIENT)
Dept: GASTROENTEROLOGY | Facility: CLINIC | Age: 62
End: 2017-11-30

## 2017-11-30 VITALS
DIASTOLIC BLOOD PRESSURE: 77 MMHG | SYSTOLIC BLOOD PRESSURE: 117 MMHG | HEART RATE: 68 BPM | BODY MASS INDEX: 34.71 KG/M2 | WEIGHT: 279.19 LBS | HEIGHT: 75 IN

## 2017-11-30 DIAGNOSIS — K63.5 POLYP OF COLON, UNSPECIFIED PART OF COLON, UNSPECIFIED TYPE: ICD-10-CM

## 2017-11-30 DIAGNOSIS — Z80.0 FAMILY HISTORY OF ESOPHAGEAL CANCER: ICD-10-CM

## 2017-11-30 DIAGNOSIS — K21.9 GASTROESOPHAGEAL REFLUX DISEASE, ESOPHAGITIS PRESENCE NOT SPECIFIED: Primary | ICD-10-CM

## 2017-11-30 PROCEDURE — 99244 OFF/OP CNSLTJ NEW/EST MOD 40: CPT | Performed by: INTERNAL MEDICINE

## 2017-11-30 RX ORDER — OMEPRAZOLE 40 MG/1
40 CAPSULE, DELAYED RELEASE ORAL DAILY
Qty: 30 CAPSULE | Refills: 5 | Status: SHIPPED | OUTPATIENT
Start: 2017-11-30 | End: 2018-06-21

## 2017-11-30 NOTE — TELEPHONE ENCOUNTER
Patient requesting sooner procedure date.  Pt was placed on wait list. Forward to surgery schedulers     cheduled for:  Colon/EGD  Provider Name:   Date:  1-10-18  Location:  [facility]  Sedation:  MAC  Time:  Pt aware [facility] will call with arrival time the day pr

## 2017-11-30 NOTE — H&P
[address] - Gastroenterology                                                                                                  Clinic History and Physical CYSTOURETERO W/EXCISE TUMOR Left      Comment: with stent and urethral dilitation  09/2017: GLOBAL ESWL KIDNEY  1999: INGUINAL HERNIA REPAIR Right  2007: KNEE ARTHROSCOPY Left  No date: KNEE SURGERY Right  8/1/14: LIPOMA REMOVAL Bilateral      Comment: Mary Ann Dumont Guadalupe Riedel   ASSESSMENT/PLAN:   Danisha Dwyer is a 60year old year-old man with hx of breast surgery for a breast lesions, family hx of esophageal ca (father), baldder cancer s/p surgery in September 2017 here regarding colonoscopy    While he did have a colon of anesthesia and all potentially leading to prolonged hospitalization or surgical intervention. I also mentioned the possibility of missed lesion. All questions were answered to the patient’s satisfaction.  The patient elected to proceed with colonoscopy w

## 2017-11-30 NOTE — PATIENT INSTRUCTIONS
1. Schedule colonoscopy and EGD (upper endoscopy) with Monitored anesthesia care (MAC) with Dr. Saúl Thomas    2.  bowel prep from pharmacy ([facility] )    3. Continue all medications for procedure    4.  Read all bowel prep instructions carefully

## 2017-12-20 ENCOUNTER — OFFICE VISIT (OUTPATIENT)
Dept: GENETICS | Facility: HOSPITAL | Age: 62
End: 2017-12-20
Attending: GENETIC COUNSELOR, MS
Payer: COMMERCIAL

## 2017-12-20 DIAGNOSIS — Z80.42 FAMILY HISTORY OF PROSTATE CANCER: ICD-10-CM

## 2017-12-20 DIAGNOSIS — Z80.3 FAMILY HISTORY OF MALIGNANT NEOPLASM OF BREAST: Primary | ICD-10-CM

## 2017-12-20 PROCEDURE — 96040 MEDICAL GENETICS COUNSELING EA 30 MIN: CPT | Performed by: GENETIC COUNSELOR, MS

## 2017-12-20 NOTE — PROGRESS NOTES
Medical History: Mr. Mina Viera is a 76-year-old man referred for a personal history of bladder cancer as well as a family history suggestive of a hereditary cancer syndrome.   He was recently diagnosed with bladder cancer incidentally while being worked passed away several weeks after this diagnosis. He was a smoker by his report. A paternal uncle passed from an unknown primary in his 62’s and his daughter passed away at age 54 from liver cancer.   He also has a first cousin once removed (his uncle’s daug with hereditary breast cancer and prostate cancer, including BRCA1 and BRCA2.   We discussed the benefits and limitations of gene-focused testing versus multi-gene panels that include all the genes associated with HBOC, or panels which encompass both breast the not so distant future. Thank you for referring Mr. Ariel Alvarado for genetic counseling; please do not hesitate to contact me if you have any questions or concerns, [phone].   Plan:  1. Mr. Ariel Alvarado will discuss the testing options available to him with hi

## 2017-12-23 ENCOUNTER — TELEPHONE (OUTPATIENT)
Dept: SURGERY | Facility: CLINIC | Age: 62
End: 2017-12-23

## 2017-12-23 DIAGNOSIS — C67.9 MALIGNANT NEOPLASM OF URINARY BLADDER, UNSPECIFIED SITE (HCC): ICD-10-CM

## 2017-12-23 DIAGNOSIS — N20.0 KIDNEY STONE: Primary | ICD-10-CM

## 2017-12-23 RX ORDER — DIAZEPAM 5 MG/1
TABLET ORAL
Qty: 3 TABLET | Refills: 0 | Status: SHIPPED | OUTPATIENT
Start: 2017-12-23 | End: 2018-10-15

## 2017-12-24 NOTE — TELEPHONE ENCOUNTER
Nurses please call patient---    PLEASE SET PATIENT UP FOR --    1. OFFICE CYSTOSCOPY WITH POSSIBLE BIOPSY in the office February 2018;    No aspirin or NSAIDs for 7--10 days before procedure  Urine for cytology to be submitted a few days or several days be

## 2017-12-26 NOTE — TELEPHONE ENCOUNTER
I spoke with pt and informed him of PVK's orders and instructions below and told him that the orders are all in the system and I gave a cysto appt for 2/28 at 8am.

## 2017-12-26 NOTE — TELEPHONE ENCOUNTER
I called in the Diazepam 5 mg, # 3 tabs to the pharmacist Sierra at the [facility] in [facility]. Pt was also notified of this earlier.

## 2018-01-05 ENCOUNTER — TELEPHONE (OUTPATIENT)
Dept: GASTROENTEROLOGY | Facility: CLINIC | Age: 63
End: 2018-01-05

## 2018-01-05 DIAGNOSIS — K21.9 GASTROESOPHAGEAL REFLUX DISEASE, ESOPHAGITIS PRESENCE NOT SPECIFIED: ICD-10-CM

## 2018-01-05 DIAGNOSIS — Z80.0 FAMILY HISTORY OF ESOPHAGEAL CANCER: Primary | ICD-10-CM

## 2018-01-05 DIAGNOSIS — K63.5 POLYP OF COLON, UNSPECIFIED PART OF COLON, UNSPECIFIED TYPE: ICD-10-CM

## 2018-01-08 ENCOUNTER — TELEPHONE (OUTPATIENT)
Dept: GASTROENTEROLOGY | Facility: CLINIC | Age: 63
End: 2018-01-08

## 2018-01-08 DIAGNOSIS — Z80.0 FAMILY HISTORY OF COLON CANCER: Primary | ICD-10-CM

## 2018-01-08 DIAGNOSIS — K63.5 POLYP OF COLON, UNSPECIFIED PART OF COLON, UNSPECIFIED TYPE: ICD-10-CM

## 2018-01-08 DIAGNOSIS — K21.9 GASTROESOPHAGEAL REFLUX DISEASE, ESOPHAGITIS PRESENCE NOT SPECIFIED: ICD-10-CM

## 2018-01-08 NOTE — TELEPHONE ENCOUNTER
Called patient and LMTCB to reschedule the below procedure since MAC is not available at the date and time scheduled.  Please transfer to [facility] in GI

## 2018-01-08 NOTE — TELEPHONE ENCOUNTER
Recheduled for:  Colonoscopy [id number] and [phone]  Provider Name: Dr. Gloria Sood  Date:    From-1/10/18  To-3/2/18  [facility]  Sedation:  MAC  Time:   0800 (pt is aware to arrive at 0700)   Prep:  Suprep, mailed new instructions on 1/8/18

## 2018-01-09 NOTE — TELEPHONE ENCOUNTER
Rescheduled for:  Colonoscopy [phone] and EGD [id number]  Provider Name:  Dr. Gloria Sood  Date:    From-3/2/18  To-3/20/18  Location:  [facility]  Sedation:  MAC  Time:    From-0800  VS-1400 (pt is aware to arrive at 1245)   Prep:  Suprep, mailed new instructions on 1/9/

## 2018-01-29 ENCOUNTER — PATIENT MESSAGE (OUTPATIENT)
Dept: SURGERY | Facility: CLINIC | Age: 63
End: 2018-01-29

## 2018-01-30 ENCOUNTER — TELEPHONE (OUTPATIENT)
Dept: SURGERY | Facility: CLINIC | Age: 63
End: 2018-01-30

## 2018-01-30 DIAGNOSIS — R10.9 FLANK PAIN: ICD-10-CM

## 2018-01-30 DIAGNOSIS — N20.0 KIDNEY STONE: Primary | ICD-10-CM

## 2018-01-30 NOTE — TELEPHONE ENCOUNTER
I spoke with pt and determined that he is having low back pain that spreads across the entire low back area more on the right side,and he thought at first that it was arthritic pain because he travels a lot and had 3 long plane rides but thinks now that it

## 2018-01-30 NOTE — TELEPHONE ENCOUNTER
From: Red Daily  To: Anna Nazario MD  Sent: 1/29/2018 9:09 AM CST  Subject: Visit Follow-up Question    Good morning. I'm scheduled for a bladder, etc. inspection at the end of the month.   However, for the past week I've had significant back ana

## 2018-01-31 NOTE — TELEPHONE ENCOUNTER
Patient contacted. Relayed Dr. Naif Gallo' entire message below to the patient. Patient verbalized understanding and states he will comply.   Patient states he is already scheduled for cystoscopy in office on Feb 28 and is aware of pre-procedure instruction

## 2018-01-31 NOTE — TELEPHONE ENCOUNTER
Please call patient back.     In the message, he pain is described as entire low back pain, right worse than left; kidney stones do not cause \"entire low back pain\"; it is more likely that his back pain is from his known disease of the spine because rosy

## 2018-02-06 ENCOUNTER — HOSPITAL ENCOUNTER (OUTPATIENT)
Dept: ULTRASOUND IMAGING | Age: 63
Discharge: HOME | End: 2018-02-06
Attending: UROLOGY
Payer: COMMERCIAL

## 2018-02-06 ENCOUNTER — HOSPITAL ENCOUNTER (OUTPATIENT)
Dept: GENERAL RADIOLOGY | Age: 63
Discharge: HOME | End: 2018-02-06
Attending: UROLOGY
Payer: COMMERCIAL

## 2018-02-06 DIAGNOSIS — N20.0 KIDNEY STONE: ICD-10-CM

## 2018-02-06 DIAGNOSIS — R10.9 FLANK PAIN: ICD-10-CM

## 2018-02-06 PROCEDURE — 74019 RADEX ABDOMEN 2 VIEWS: CPT | Performed by: UROLOGY

## 2018-02-06 PROCEDURE — 76770 US EXAM ABDO BACK WALL COMP: CPT | Performed by: UROLOGY

## 2018-02-19 ENCOUNTER — APPOINTMENT (OUTPATIENT)
Dept: LAB | Age: 63
End: 2018-02-19
Attending: UROLOGY
Payer: COMMERCIAL

## 2018-02-19 DIAGNOSIS — N20.0 KIDNEY STONE: ICD-10-CM

## 2018-02-19 DIAGNOSIS — C67.9 MALIGNANT NEOPLASM OF URINARY BLADDER, UNSPECIFIED SITE (HCC): ICD-10-CM

## 2018-02-19 PROCEDURE — 83945 ASSAY OF OXALATE: CPT

## 2018-02-19 PROCEDURE — 88108 CYTOPATH CONCENTRATE TECH: CPT

## 2018-02-19 PROCEDURE — 82340 ASSAY OF CALCIUM IN URINE: CPT

## 2018-02-19 PROCEDURE — 84560 ASSAY OF URINE/URIC ACID: CPT

## 2018-02-19 PROCEDURE — 82507 ASSAY OF CITRATE: CPT

## 2018-02-19 PROCEDURE — 84105 ASSAY OF URINE PHOSPHORUS: CPT

## 2018-02-19 PROCEDURE — 81003 URINALYSIS AUTO W/O SCOPE: CPT

## 2018-02-19 PROCEDURE — 84300 ASSAY OF URINE SODIUM: CPT

## 2018-02-22 LAB
CALCIUM 24H UR-MRATE: 128 MG/24H (ref 50–150)
CITRIC ACID, URINE - MG/DAY: 383 MG/D
CITRIC ACID, URINE - MG/L: 174 MG/L
CITRIC ACID/CREATININE RATIO: 172 MG/G
CREATININE, URINE - PER 24H: 2222 MG/D
CREATININE, URINE - PER VOLUME: 101 MG/DL
HOURS COLLECTED: 24 HR
PH 24H UR: 6 [PH] (ref 5–8)
PHOSPHATE 24H UR-MRATE: 1.2 G/24 HR (ref 0.4–1.3)
SPECIMEN VOL 24H UR: 2200 ML/24H
TOTAL VOLUME: 2200 ML
URATE 24H UR-MRATE: 1027 MG/24H (ref 250–750)

## 2018-02-23 LAB
CREATININE, URINE - PER 24H: 2332 MG/D
CREATININE, URINE - PER VOLUME: 106 MG/DL
HOURS COLLECTED: 24 HR
OXALATE, URINE - MG/DAY: 46 MG/D
OXALATE, URINE - MG/L: 21 MG/L
TOTAL VOLUME: 2200 ML

## 2018-02-28 ENCOUNTER — OFFICE VISIT (OUTPATIENT)
Dept: SURGERY | Facility: CLINIC | Age: 63
End: 2018-02-28

## 2018-02-28 VITALS
TEMPERATURE: 70 F | DIASTOLIC BLOOD PRESSURE: 80 MMHG | HEART RATE: 75 BPM | BODY MASS INDEX: 33.94 KG/M2 | WEIGHT: 273 LBS | SYSTOLIC BLOOD PRESSURE: 110 MMHG | HEIGHT: 75 IN

## 2018-02-28 DIAGNOSIS — N20.0 KIDNEY STONE: ICD-10-CM

## 2018-02-28 DIAGNOSIS — C67.0 MALIGNANT NEOPLASM OF TRIGONE OF URINARY BLADDER (HCC): Primary | ICD-10-CM

## 2018-02-28 DIAGNOSIS — N40.1 BENIGN PROSTATIC HYPERPLASIA WITH URINARY FREQUENCY: ICD-10-CM

## 2018-02-28 DIAGNOSIS — M54.89 MIDLINE BACK PAIN, UNSPECIFIED BACK LOCATION, UNSPECIFIED CHRONICITY: ICD-10-CM

## 2018-02-28 DIAGNOSIS — N13.30 HYDRONEPHROSIS, UNSPECIFIED HYDRONEPHROSIS TYPE: ICD-10-CM

## 2018-02-28 DIAGNOSIS — R82.993 HYPERURICOSURIA: ICD-10-CM

## 2018-02-28 DIAGNOSIS — R35.0 BENIGN PROSTATIC HYPERPLASIA WITH URINARY FREQUENCY: ICD-10-CM

## 2018-02-28 DIAGNOSIS — R35.1 NOCTURIA: ICD-10-CM

## 2018-02-28 PROCEDURE — 52000 CYSTOURETHROSCOPY: CPT | Performed by: UROLOGY

## 2018-02-28 PROCEDURE — 99212 OFFICE O/P EST SF 10 MIN: CPT | Performed by: UROLOGY

## 2018-02-28 PROCEDURE — 99214 OFFICE O/P EST MOD 30 MIN: CPT | Performed by: UROLOGY

## 2018-02-28 RX ORDER — ALLOPURINOL 100 MG/1
100 TABLET ORAL DAILY
Qty: 90 TABLET | Refills: 3 | Status: SHIPPED | OUTPATIENT
Start: 2018-02-28 | End: 2018-12-03 | Stop reason: DRUGHIGH

## 2018-02-28 RX ORDER — DIAZEPAM 5 MG/1
TABLET ORAL
Qty: 9 TABLET | Refills: 0 | Status: SHIPPED | OUTPATIENT
Start: 2018-02-28 | End: 2018-06-29

## 2018-02-28 NOTE — PROGRESS NOTES
PREOPERATIVE DIAGNOSIS:     Bladder Cancer of the right side of trigone 9/13/2017     POSTOP DIAGNOSIS:               The same    PROCEDURE:              Cystoscopy              ANESTHESIA:     2% Xylocaine jelly local; Diazepam 15 mg  also see above    FI appetite or bone pain or new unresolving cough. 2. Large left obstructing UPJ calculus  Started causing pain 9/8/17; 9/12/17 went to [address] [place] [address] ER where evaluated and had CT showing a large 1.3 cm left UPJ stone; admitted.   I saw patient in consultation 9/13/1 CHART   Patient reports for prior kidney stone episodes with last one being 5-6 years ago; episodes consisted of persistent flank pain and resolved on its own each time; patient never saw the stone, and prior to this admission, never had kidney stone surge );  Na Urine - Urine Sodium is below the sensitivity of this assay; Uric Urine - 1,027 (hyperuricosuria 250-750); pH = 6.0  02/19/2018 Urine Cytology = Negative for High grade urothelial carcinoma  10/30/2018 Calculi Composition = 10% Calcium oxalate Dictated by (CST): Lex Brown MD on 9/12/2017 at 21:39     Approved by (CST): Lex Brown MD on 9/12/2017 at 21:41        CONCLUSION: Retained contrast within the left renal collecting system consistent with patient's known severe left hydronephrosis.  P Prior right inguinal hernia repair. BONES:   There is degenerative disease of the thoracic and lumbar spine. Degenerative changes are seen within the sacroiliac joints. Degenerative changes are seen in the bilateral hips.  Bone island in the right pedicle o 24 hour urine collection prior to a follow up visit to be scheduled for September-October 2018.   I explained to patient recommended measures to help prevent kidney stone formation-such as increasing fluid intake.     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 . If you have burning with urination, please buy \"AZO\" over the counter medication for burning - take 1 tablet every 8 hours as needed for burning pain. It makes the color of the urine bright orange red. calcium in the diet may actually make things worse and we do want you to take in some calcium in your diet, up to 1,200 mg a day. Be careful not to take high doses of calcium; you may be better off taking vitamin D3 instead.   With respect to how much sandra

## 2018-02-28 NOTE — PATIENT INSTRUCTIONS
1. . If you have burning with urination, please buy \"AZO\" over the counter medication for burning - take 1 tablet every 8 hours as needed for burning pain. It makes the color of the urine bright orange red.     2.  Cystoscopy with possible biopsy in 3 mon worse and we do want you to take in some calcium in your diet, up to 1,200 mg a day. Be careful not to take high doses of calcium; you may be better off taking vitamin D3 instead.   With respect to how much vitamin D3 to take, getting blood levels of vitam

## 2018-03-01 RX ORDER — ALFUZOSIN HYDROCHLORIDE 10 MG/1
TABLET, EXTENDED RELEASE ORAL
Qty: 30 TABLET | Refills: 11 | Status: SHIPPED | OUTPATIENT
Start: 2018-03-01 | End: 2019-01-23

## 2018-03-01 NOTE — TELEPHONE ENCOUNTER
Dr. Jasmyne Metzger, pt [facility] 2/28/18 pt pharmacy requesting refill on alfuzosin if you agree please review and sign med, thank you. I copied and pasted part of your last note below.     1. . If you have burning with urination, please buy \"AZO\" over the counter m

## 2018-03-05 ENCOUNTER — HOSPITAL ENCOUNTER (OUTPATIENT)
Dept: GENERAL RADIOLOGY | Facility: HOSPITAL | Age: 63
Discharge: HOME | End: 2018-03-05
Attending: ORTHOPAEDIC SURGERY
Payer: COMMERCIAL

## 2018-03-05 ENCOUNTER — OFFICE VISIT (OUTPATIENT)
Dept: ORTHOPEDICS CLINIC | Facility: CLINIC | Age: 63
End: 2018-03-05

## 2018-03-05 DIAGNOSIS — M25.561 PAIN IN BOTH KNEES, UNSPECIFIED CHRONICITY: ICD-10-CM

## 2018-03-05 DIAGNOSIS — M25.562 PAIN IN BOTH KNEES, UNSPECIFIED CHRONICITY: ICD-10-CM

## 2018-03-05 DIAGNOSIS — M17.0 PRIMARY OSTEOARTHRITIS OF BOTH KNEES: Primary | ICD-10-CM

## 2018-03-05 PROCEDURE — 99212 OFFICE O/P EST SF 10 MIN: CPT | Performed by: ORTHOPAEDIC SURGERY

## 2018-03-05 PROCEDURE — 99203 OFFICE O/P NEW LOW 30 MIN: CPT | Performed by: ORTHOPAEDIC SURGERY

## 2018-03-05 PROCEDURE — 73562 X-RAY EXAM OF KNEE 3: CPT | Performed by: ORTHOPAEDIC SURGERY

## 2018-03-20 ENCOUNTER — ANESTHESIA (OUTPATIENT)
Dept: ENDOSCOPY | Facility: HOSPITAL | Age: 63
End: 2018-03-20
Payer: COMMERCIAL

## 2018-03-20 ENCOUNTER — SURGERY (OUTPATIENT)
Age: 63
End: 2018-03-20

## 2018-03-20 ENCOUNTER — HOSPITAL ENCOUNTER (OUTPATIENT)
Facility: HOSPITAL | Age: 63
Setting detail: HOSPITAL OUTPATIENT SURGERY
Discharge: HOME | End: 2018-03-20
Attending: INTERNAL MEDICINE | Admitting: INTERNAL MEDICINE
Payer: COMMERCIAL

## 2018-03-20 ENCOUNTER — ANESTHESIA EVENT (OUTPATIENT)
Dept: ENDOSCOPY | Facility: HOSPITAL | Age: 63
End: 2018-03-20
Payer: COMMERCIAL

## 2018-03-20 DIAGNOSIS — K63.5 POLYP OF COLON, UNSPECIFIED PART OF COLON, UNSPECIFIED TYPE: ICD-10-CM

## 2018-03-20 DIAGNOSIS — Z80.0 FAMILY HISTORY OF ESOPHAGEAL CANCER: ICD-10-CM

## 2018-03-20 DIAGNOSIS — K21.9 GASTROESOPHAGEAL REFLUX DISEASE, ESOPHAGITIS PRESENCE NOT SPECIFIED: ICD-10-CM

## 2018-03-20 DIAGNOSIS — K21.9 GERD (GASTROESOPHAGEAL REFLUX DISEASE): ICD-10-CM

## 2018-03-20 DIAGNOSIS — Z12.11 COLON CANCER SCREENING: ICD-10-CM

## 2018-03-20 PROCEDURE — 3E0H8GC INTRODUCTION OF OTHER THERAPEUTIC SUBSTANCE INTO LOWER GI, VIA NATURAL OR ARTIFICIAL OPENING ENDOSCOPIC: ICD-10-PCS | Performed by: INTERNAL MEDICINE

## 2018-03-20 PROCEDURE — 0DB68ZX EXCISION OF STOMACH, VIA NATURAL OR ARTIFICIAL OPENING ENDOSCOPIC, DIAGNOSTIC: ICD-10-PCS | Performed by: INTERNAL MEDICINE

## 2018-03-20 PROCEDURE — 45385 COLONOSCOPY W/LESION REMOVAL: CPT | Performed by: INTERNAL MEDICINE

## 2018-03-20 PROCEDURE — 45381 COLONOSCOPY SUBMUCOUS NJX: CPT | Performed by: INTERNAL MEDICINE

## 2018-03-20 PROCEDURE — 0DBK8ZX EXCISION OF ASCENDING COLON, VIA NATURAL OR ARTIFICIAL OPENING ENDOSCOPIC, DIAGNOSTIC: ICD-10-PCS | Performed by: INTERNAL MEDICINE

## 2018-03-20 PROCEDURE — 43239 EGD BIOPSY SINGLE/MULTIPLE: CPT | Performed by: INTERNAL MEDICINE

## 2018-03-20 RX ORDER — SODIUM CHLORIDE, SODIUM LACTATE, POTASSIUM CHLORIDE, CALCIUM CHLORIDE 600; 310; 30; 20 MG/100ML; MG/100ML; MG/100ML; MG/100ML
INJECTION, SOLUTION INTRAVENOUS CONTINUOUS
Status: DISCONTINUED | OUTPATIENT
Start: 2018-03-20 | End: 2018-03-20

## 2018-03-20 RX ORDER — NALOXONE HYDROCHLORIDE 0.4 MG/ML
80 INJECTION, SOLUTION INTRAMUSCULAR; INTRAVENOUS; SUBCUTANEOUS AS NEEDED
Status: CANCELLED | OUTPATIENT
Start: 2018-03-20 | End: 2018-03-20

## 2018-03-20 RX ORDER — SODIUM CHLORIDE, SODIUM LACTATE, POTASSIUM CHLORIDE, CALCIUM CHLORIDE 600; 310; 30; 20 MG/100ML; MG/100ML; MG/100ML; MG/100ML
INJECTION, SOLUTION INTRAVENOUS CONTINUOUS
Status: CANCELLED | OUTPATIENT
Start: 2018-03-20

## 2018-03-20 RX ORDER — LIDOCAINE HYDROCHLORIDE 10 MG/ML
INJECTION, SOLUTION EPIDURAL; INFILTRATION; INTRACAUDAL; PERINEURAL AS NEEDED
Status: DISCONTINUED | OUTPATIENT
Start: 2018-03-20 | End: 2018-03-20 | Stop reason: SURG

## 2018-03-20 RX ADMIN — SODIUM CHLORIDE, SODIUM LACTATE, POTASSIUM CHLORIDE, CALCIUM CHLORIDE: 600; 310; 30; 20 INJECTION, SOLUTION INTRAVENOUS at 14:15:00

## 2018-03-20 RX ADMIN — LIDOCAINE HYDROCHLORIDE 25 MG: 10 INJECTION, SOLUTION EPIDURAL; INFILTRATION; INTRACAUDAL; PERINEURAL at 13:30:00

## 2018-03-20 RX ADMIN — SODIUM CHLORIDE, SODIUM LACTATE, POTASSIUM CHLORIDE, CALCIUM CHLORIDE: 600; 310; 30; 20 INJECTION, SOLUTION INTRAVENOUS at 13:26:00

## 2018-03-20 NOTE — OPERATIVE REPORT
Esophagogastroduodenoscopy (EGD) & Colonoscopy Report    Carmie Curling     1955 Age 60year old   PCP Venkatesh Fernandez DO Endoscopist Flaco Wade MD     Date of procedure: 18    Procedure: EGD w/ biopsy & Colonoscopy w/ submucosal inje using the air insufflation technique (only Co2 was used for insufflation). The cecum was identified by localizing the trifold, the appendix and the ileocecal valve.  Withdrawal was begun with thorough washing and careful examination of the colonic walls and endoscopic hemoclips. There was a 2nd 1 cm sessile polyp just distal to this also lifted with submucosal saline injection and removed by snare cautery polypectomy.  Again, the small areas of bleeding at the polypectomy site were cauterized with the tip of t

## 2018-03-20 NOTE — H&P
History & Physical Examination    Patient Name: Jared Smith  MRN: [id number]  [facility]: [id number]  YOB: 1955    Diagnosis: colorectal cancer screening, GERD      Prescriptions Prior to Admission:  ALFUZOSIN HCL ER 10 MG Oral Tablet 24 Hr TAKE Problem Relation Age of Onset   • Cancer Father 66     esophageal; smoker; d.78   • Breast Cancer Mother 79     d.77 from mets to liver   • Other [OTHER] Sister 48     lupus   • Cancer Brother 61     prostate ca; d.65   • Breast Cancer Maternal Grandmoth

## 2018-03-20 NOTE — ANESTHESIA PREPROCEDURE EVALUATION
Anesthesia PreOp Note    HPI:     Glenn Barnett is a 60year old male who presents for preoperative consultation requested by: Rona Jones MD    Date of Surgery: 3/20/2018    Procedure(s):  COLONOSCOPY  ESOPHAGOGASTRODUODENOSCOPY (EGD)  Indication diazepam 5 MG Oral Tab Take 3 tablets by mouth, WHEN YOU ARRIVE AT THE OFFICE for follow-up cystoscopies; you must have a .  Disp: 9 tablet Rfl: 0 Not Taking   diazepam 5 MG Oral Tab Take 3 tablets by mouth, WHEN YOU ARRIVE AT THE OFFICE for the proce Social History Narrative   None on file       Available pre-op labs reviewed. Vital Signs: Body mass index is 34.62 kg/m². height is 1.905 m (6' 3\") and weight is 125.6 kg (277 lb). His blood pressure is 128/78 and his pulse is 63.  His resp

## 2018-03-20 NOTE — ANESTHESIA POSTPROCEDURE EVALUATION
Patient: Lulu Haq    Procedure Summary     Date:  03/20/18 Room / Location:  [address] [place] [address] ENDOSCOPY 03 / [address] [place] [address] ENDOSCOPY    Anesthesia Start:  2820 Anesthesia Stop:  0885    Procedures:       COLONOSCOPY (N/A )      ESOPHAGOGASTRODUODENOSCOPY (EGD) (N/A ) Diagn

## 2018-03-21 VITALS
HEIGHT: 75 IN | HEART RATE: 58 BPM | BODY MASS INDEX: 34.44 KG/M2 | SYSTOLIC BLOOD PRESSURE: 138 MMHG | RESPIRATION RATE: 15 BRPM | DIASTOLIC BLOOD PRESSURE: 80 MMHG | OXYGEN SATURATION: 98 % | WEIGHT: 277 LBS

## 2018-03-22 ENCOUNTER — TELEPHONE (OUTPATIENT)
Dept: GASTROENTEROLOGY | Facility: CLINIC | Age: 63
End: 2018-03-22

## 2018-03-22 NOTE — TELEPHONE ENCOUNTER
Message   Received: Yesterday   Message Contents   Millie Bolaños MD  [facility] Clinical Staff             GI staff: please place recall for colonoscopy in 3 years      Letter mailed out to pt & sent via [address]  Recall entered for 3 years

## 2018-04-02 ENCOUNTER — OFFICE VISIT (OUTPATIENT)
Dept: PODIATRY CLINIC | Facility: CLINIC | Age: 63
End: 2018-04-02

## 2018-04-02 DIAGNOSIS — M72.2 PLANTAR FASCIITIS, BILATERAL: Primary | ICD-10-CM

## 2018-04-02 PROCEDURE — 99212 OFFICE O/P EST SF 10 MIN: CPT | Performed by: PODIATRIST

## 2018-04-02 PROCEDURE — 99203 OFFICE O/P NEW LOW 30 MIN: CPT | Performed by: PODIATRIST

## 2018-04-02 NOTE — PROGRESS NOTES
HPI:    Patient ID: Liam Tripathi is a 60year old male. HPI  This 19-year-old male presents as a new patient to me and is self-referred. Patient states that he is here for an orthotic device.   He has been wearing an orthotic device for as much is 30 wearing the device. I am pleased with the present device although it is no longer working.   I recommended a new orthotic and after complete discussion and review patient was given information to seek the appropriate approval.  Once approval plan follow-up

## 2018-05-21 ENCOUNTER — TELEPHONE (OUTPATIENT)
Dept: SURGERY | Facility: CLINIC | Age: 63
End: 2018-05-21

## 2018-05-21 NOTE — TELEPHONE ENCOUNTER
Patient has cysto with bx scheduled for 05/23 at 2:00pm would like to see if its possible to reschedule for an earlier time that date or the next day. Please all. Thank you.

## 2018-05-22 NOTE — TELEPHONE ENCOUNTER
LMTCB. Left message on patient's voice mail stating there is not an earlier time for cysto tomorrow, and do not have any availability the next day either; left message that I kept patient on for tomorrow @ 2 pm as planned.

## 2018-05-23 ENCOUNTER — TELEPHONE (OUTPATIENT)
Dept: SURGERY | Facility: CLINIC | Age: 63
End: 2018-05-23

## 2018-05-23 NOTE — TELEPHONE ENCOUNTER
pt called  Regarding todays appt, Cysto with PVK. He states the Valium was never filled. Also pt has an event today in [facility]. pt states if your not able to guarantee he will be out by 3pm,  he will need to reschedule. Thank you.

## 2018-05-23 NOTE — TELEPHONE ENCOUNTER
Patient contacted. Informed patient that our office will do our best to have him out of his cysto by 3:00 pm today, but cannot guarantee.   Patient states since he has an event this afternoon, and unable to take the diazepam, he will reschedule his cystosco

## 2018-06-21 RX ORDER — OMEPRAZOLE 40 MG/1
CAPSULE, DELAYED RELEASE ORAL
Qty: 30 CAPSULE | Refills: 0 | Status: SHIPPED | OUTPATIENT
Start: 2018-06-21 | End: 2018-07-28

## 2018-06-21 NOTE — TELEPHONE ENCOUNTER
Rx request for Omeprazole 40 mg, Please review. Thank you.     LOV: 3/20/18 colonoscopy   Last Refill: 11/30/17

## 2018-06-29 ENCOUNTER — TELEPHONE (OUTPATIENT)
Dept: SURGERY | Facility: CLINIC | Age: 63
End: 2018-06-29

## 2018-06-29 RX ORDER — DIAZEPAM 5 MG/1
TABLET ORAL
Qty: 9 TABLET | Refills: 0 | OUTPATIENT
Start: 2018-06-29 | End: 2018-10-15

## 2018-06-29 NOTE — TELEPHONE ENCOUNTER
pt called. He accidentally misplaced or threw away his RX for Valium. Could you please call this in to [facility]. He has a cysto scheduled for Mom July 2 at 9:20am.  Please advise  Thank you.

## 2018-06-29 NOTE — TELEPHONE ENCOUNTER
Spoke with GT and told him that I will pend him a new order for pt's diazepam for Monday morning and I will call it in to his pharmacy. Tasked to [address] I called the diazepam 5 mg, 3 tabs script in to the [facility] and spoke with the pharmacist, Ruth.

## 2018-06-30 ENCOUNTER — APPOINTMENT (OUTPATIENT)
Dept: LAB | Age: 63
End: 2018-06-30
Attending: UROLOGY
Payer: COMMERCIAL

## 2018-06-30 DIAGNOSIS — C67.0 MALIGNANT NEOPLASM OF TRIGONE OF URINARY BLADDER (HCC): ICD-10-CM

## 2018-06-30 PROCEDURE — 88108 CYTOPATH CONCENTRATE TECH: CPT

## 2018-07-02 ENCOUNTER — OFFICE VISIT (OUTPATIENT)
Dept: SURGERY | Facility: CLINIC | Age: 63
End: 2018-07-02

## 2018-07-02 VITALS
RESPIRATION RATE: 16 BRPM | BODY MASS INDEX: 33.45 KG/M2 | TEMPERATURE: 97 F | WEIGHT: 269 LBS | DIASTOLIC BLOOD PRESSURE: 84 MMHG | SYSTOLIC BLOOD PRESSURE: 120 MMHG | HEART RATE: 70 BPM | HEIGHT: 75 IN

## 2018-07-02 DIAGNOSIS — R35.0 BENIGN PROSTATIC HYPERPLASIA WITH URINARY FREQUENCY: ICD-10-CM

## 2018-07-02 DIAGNOSIS — Z91.89 AT RISK FOR SIDE EFFECT OF MEDICATION: ICD-10-CM

## 2018-07-02 DIAGNOSIS — Z12.5 PROSTATE CANCER SCREENING: ICD-10-CM

## 2018-07-02 DIAGNOSIS — C67.0 MALIGNANT NEOPLASM OF TRIGONE OF URINARY BLADDER (HCC): Primary | ICD-10-CM

## 2018-07-02 DIAGNOSIS — N20.0 KIDNEY STONE: ICD-10-CM

## 2018-07-02 DIAGNOSIS — R82.993 HYPERURICOSURIA: ICD-10-CM

## 2018-07-02 DIAGNOSIS — N40.1 BENIGN PROSTATIC HYPERPLASIA WITH URINARY FREQUENCY: ICD-10-CM

## 2018-07-02 DIAGNOSIS — M54.89 MIDLINE BACK PAIN, UNSPECIFIED BACK LOCATION, UNSPECIFIED CHRONICITY: ICD-10-CM

## 2018-07-02 DIAGNOSIS — R35.1 NOCTURIA: ICD-10-CM

## 2018-07-02 DIAGNOSIS — N13.30 HYDRONEPHROSIS, UNSPECIFIED HYDRONEPHROSIS TYPE: ICD-10-CM

## 2018-07-02 PROCEDURE — 52000 CYSTOURETHROSCOPY: CPT | Performed by: UROLOGY

## 2018-07-02 PROCEDURE — 99213 OFFICE O/P EST LOW 20 MIN: CPT | Performed by: UROLOGY

## 2018-07-02 RX ORDER — CIPROFLOXACIN 500 MG/1
500 TABLET, FILM COATED ORAL ONCE
Status: COMPLETED | OUTPATIENT
Start: 2018-07-02 | End: 2018-07-02

## 2018-07-02 RX ADMIN — CIPROFLOXACIN 500 MG: 500 TABLET, FILM COATED ORAL at 10:45:00

## 2018-07-02 NOTE — PROGRESS NOTES
PREOPERATIVE DIAGNOSIS:     Bladder Cancer of the right side of trigone 9/13/2017; no recurrence since     POSTOP DIAGNOSIS:               The same    PROCEDURE:              Cystoscopy              ANESTHESIA:     2% Xylocaine jelly local; Diazepam 15 mg suggest advanced or metastatic   bladder cancer such as weight loss or loss of appetite or bone pain or new unresolving cough.     2. History of Large left obstructing UPJ calculus  Started causing pain 9/8/17; 9/12/17 went to [address] [place] [address] ER where evaluated and had resolved on its own each time; patient never saw the stone, and prior to this admission, never had kidney stone surgery. Patient reports history of pyelonephritis as a child but denies any UTIs after that.   No prior urological surgery before this admissio pH = 6.0  02/19/2018 Urine Cytology = Negative for High grade urothelial carcinoma  10/30/2018 Calculi Composition = 10% Calcium oxalate dihydrate; 90% uric acid  10/27/2017 UA RBC = 1,215 (this is with left ureteral stent in place); WBC = 7  09/13/2017 PS pelvis with severe left hydronephrosis. There is a delayed left nephrogram. Significant left perinephric inflammatory fat stranding. There is a right extrarenal pelvis without hydronephrosis. No right renal calculus.  RETROPERITONEUM: No mass or enlarged ad with BMP and Hepatic panel and he agrees.  I explained to patient recommended measures to help prevent kidney stone formation- please see instructions below for a summary of the discussion that took place.     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         RECOMMENDATIONS AND TREATMENT PLAN     1. If you have burning with urination, please buy \"AZO\" over the counter medication for burning - take 1 tablet every 8 hours as needed for burning pain.  It makes the color of the u calcium; you may be better off taking vitamin D3 instead. With respect to how much vitamin D3 to take, getting blood levels of vitamin D once or twice a year may be the best way to monitor this, but check with your primary physician.         Excluding the

## 2018-07-02 NOTE — PATIENT INSTRUCTIONS
1. If you have burning with urination, please buy \"AZO\" over the counter medication for burning - take 1 tablet every 8 hours as needed for burning pain. It makes the color of the urine bright orange red. 2.  Continue alfuzosin 10 mg daily    3.   Cont much vitamin D3 to take, getting blood levels of vitamin D once or twice a year may be the best way to monitor this, but check with your primary physician.

## 2018-07-30 RX ORDER — OMEPRAZOLE 40 MG/1
CAPSULE, DELAYED RELEASE ORAL
Qty: 30 CAPSULE | Refills: 0 | Status: SHIPPED | OUTPATIENT
Start: 2018-07-30 | End: 2018-10-13

## 2018-07-30 NOTE — TELEPHONE ENCOUNTER
Pending Prescriptions Disp Refills    OMEPRAZOLE 40 MG Oral Capsule Delayed Release [Pharmacy Med Name: OMEPRAZOLE 40MG CAPSULES] 30 capsule 0     Sig: TAKE 1 CAPSULE(40 MG) BY MOUTH DAILY BEFORE BREAKFAST         LOV: 3/20/18 (CLN/EGD)  LR: 6/21/18

## 2018-10-01 ENCOUNTER — OFFICE VISIT (OUTPATIENT)
Dept: SURGERY | Facility: CLINIC | Age: 63
End: 2018-10-01
Payer: COMMERCIAL

## 2018-10-01 VITALS
DIASTOLIC BLOOD PRESSURE: 88 MMHG | BODY MASS INDEX: 33.2 KG/M2 | HEART RATE: 74 BPM | HEIGHT: 75 IN | WEIGHT: 267 LBS | SYSTOLIC BLOOD PRESSURE: 130 MMHG | RESPIRATION RATE: 16 BRPM | TEMPERATURE: 98 F

## 2018-10-01 DIAGNOSIS — R35.0 BENIGN PROSTATIC HYPERPLASIA WITH URINARY FREQUENCY: ICD-10-CM

## 2018-10-01 DIAGNOSIS — N40.1 BENIGN PROSTATIC HYPERPLASIA WITH URINARY FREQUENCY: ICD-10-CM

## 2018-10-01 DIAGNOSIS — R82.993 HYPERURICOSURIA: ICD-10-CM

## 2018-10-01 DIAGNOSIS — Z12.5 PROSTATE CANCER SCREENING: ICD-10-CM

## 2018-10-01 DIAGNOSIS — C67.0 MALIGNANT NEOPLASM OF TRIGONE OF URINARY BLADDER (HCC): Primary | ICD-10-CM

## 2018-10-01 DIAGNOSIS — N20.0 KIDNEY STONE: ICD-10-CM

## 2018-10-01 DIAGNOSIS — M54.89 MIDLINE BACK PAIN, UNSPECIFIED BACK LOCATION, UNSPECIFIED CHRONICITY: ICD-10-CM

## 2018-10-01 DIAGNOSIS — R35.1 NOCTURIA: ICD-10-CM

## 2018-10-01 DIAGNOSIS — N13.30 HYDRONEPHROSIS, UNSPECIFIED HYDRONEPHROSIS TYPE: ICD-10-CM

## 2018-10-01 PROCEDURE — 99213 OFFICE O/P EST LOW 20 MIN: CPT | Performed by: UROLOGY

## 2018-10-01 PROCEDURE — 99212 OFFICE O/P EST SF 10 MIN: CPT | Performed by: UROLOGY

## 2018-10-01 PROCEDURE — 52000 CYSTOURETHROSCOPY: CPT | Performed by: UROLOGY

## 2018-10-01 RX ORDER — CIPROFLOXACIN 500 MG/1
500 TABLET, FILM COATED ORAL ONCE
Status: COMPLETED | OUTPATIENT
Start: 2018-10-01 | End: 2018-10-01

## 2018-10-01 RX ADMIN — CIPROFLOXACIN 500 MG: 500 TABLET, FILM COATED ORAL at 10:38:00

## 2018-10-01 NOTE — PATIENT INSTRUCTIONS
1. If your urine is bloody, please drink enough liquids to dilute out the blood. If the urine doesn't show any signs of blood, you can drink your usual amount of liquids.  Once there are no signs of blood, you can likely restart daily aspirin or other anti-

## 2018-10-01 NOTE — PROGRESS NOTES
PREOPERATIVE DIAGNOSIS:     Bladder Cancer of the right side of trigone 9/13/2017; no recurrence since      POSTOP DIAGNOSIS:               The same    PROCEDURE:              Cystoscopy              ANESTHESIA:     2% Xylocaine jelly local; Diazepam 15  m cancer such as weight loss or loss of appetite or bone pain or new unresolving cough. Pt has a cigar once in a while.       2. History of Large left obstructing UPJ calculus  Started causing pain 9/8/17; 9/12/17 went to [address] [place] [address] ER where evaluated and had CT helena No prior urological surgery before this admission.               9/12/17 went to [address] [place] [address] ER for left lower quadrant pain; urinalysis showed microhematuria; CT with IV contrast ordered to exclude possibility of diverticulitis and unexpectedly showed left hydrone (hyperuricosuria 250-750); pH = 6.0  02/19/2018 Urine Cytology = Negative for High grade urothelial carcinoma  10/30/2018 Calculi Composition = 10% Calcium oxalate dihydrate; 90% uric acid  10/27/2017 UA RBC = 1,215 (this is with left ureteral stent in faina calculus at the left renal pelvis with severe left hydronephrosis. There is a delayed left nephrogram. Significant left perinephric inflammatory fat stranding. There is a right extrarenal pelvis without hydronephrosis. No right renal calculus.  Courtmarshall Flaherty measures to help prevent kidney stone formation- please see instructions below for a summary of the discussion that took place.     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  Noemy screening  09/13/2017 PSA = 0.7. Discussed with pt blood draw for PSA in 2 months. RECOMMENDATIONS AND TREATMENT PLAN     1. If your urine is bloody, please drink enough liquids to dilute out the blood.  If the urine doesn't show any signs of blood, y complete.   Lupe Fong MD, 10/1/2018, 7:57 PM

## 2018-10-15 ENCOUNTER — PATIENT MESSAGE (OUTPATIENT)
Dept: GASTROENTEROLOGY | Facility: CLINIC | Age: 63
End: 2018-10-15

## 2018-10-15 ENCOUNTER — OFFICE VISIT (OUTPATIENT)
Dept: FAMILY MEDICINE CLINIC | Facility: CLINIC | Age: 63
End: 2018-10-15
Payer: COMMERCIAL

## 2018-10-15 ENCOUNTER — LAB ENCOUNTER (OUTPATIENT)
Dept: LAB | Age: 63
End: 2018-10-15
Attending: FAMILY MEDICINE
Payer: COMMERCIAL

## 2018-10-15 VITALS
HEART RATE: 65 BPM | SYSTOLIC BLOOD PRESSURE: 135 MMHG | DIASTOLIC BLOOD PRESSURE: 89 MMHG | WEIGHT: 271 LBS | HEIGHT: 75 IN | TEMPERATURE: 99 F | BODY MASS INDEX: 33.69 KG/M2

## 2018-10-15 DIAGNOSIS — C67.9 MALIGNANT NEOPLASM OF URINARY BLADDER, UNSPECIFIED SITE (HCC): ICD-10-CM

## 2018-10-15 DIAGNOSIS — K21.9 CHRONIC GERD: ICD-10-CM

## 2018-10-15 DIAGNOSIS — Z00.00 ROUTINE GENERAL MEDICAL EXAMINATION AT A HEALTH CARE FACILITY: ICD-10-CM

## 2018-10-15 DIAGNOSIS — Z87.39 HISTORY OF GOUT: ICD-10-CM

## 2018-10-15 DIAGNOSIS — G47.33 OSA (OBSTRUCTIVE SLEEP APNEA): ICD-10-CM

## 2018-10-15 DIAGNOSIS — M79.641 RIGHT HAND PAIN: ICD-10-CM

## 2018-10-15 DIAGNOSIS — Z00.00 ROUTINE GENERAL MEDICAL EXAMINATION AT A HEALTH CARE FACILITY: Primary | ICD-10-CM

## 2018-10-15 DIAGNOSIS — Z91.89 AT RISK FOR SIDE EFFECT OF MEDICATION: ICD-10-CM

## 2018-10-15 PROCEDURE — 36415 COLL VENOUS BLD VENIPUNCTURE: CPT

## 2018-10-15 PROCEDURE — 85025 COMPLETE CBC W/AUTO DIFF WBC: CPT

## 2018-10-15 PROCEDURE — 82248 BILIRUBIN DIRECT: CPT

## 2018-10-15 PROCEDURE — 99212 OFFICE O/P EST SF 10 MIN: CPT | Performed by: FAMILY MEDICINE

## 2018-10-15 PROCEDURE — 90471 IMMUNIZATION ADMIN: CPT | Performed by: FAMILY MEDICINE

## 2018-10-15 PROCEDURE — 99213 OFFICE O/P EST LOW 20 MIN: CPT | Performed by: FAMILY MEDICINE

## 2018-10-15 PROCEDURE — 84550 ASSAY OF BLOOD/URIC ACID: CPT

## 2018-10-15 PROCEDURE — 82306 VITAMIN D 25 HYDROXY: CPT

## 2018-10-15 PROCEDURE — 90686 IIV4 VACC NO PRSV 0.5 ML IM: CPT | Performed by: FAMILY MEDICINE

## 2018-10-15 PROCEDURE — 80053 COMPREHEN METABOLIC PANEL: CPT

## 2018-10-15 PROCEDURE — 81003 URINALYSIS AUTO W/O SCOPE: CPT

## 2018-10-15 PROCEDURE — 99396 PREV VISIT EST AGE 40-64: CPT | Performed by: FAMILY MEDICINE

## 2018-10-15 PROCEDURE — 80061 LIPID PANEL: CPT

## 2018-10-15 RX ORDER — IBUPROFEN 800 MG/1
800 TABLET ORAL
COMMUNITY
Start: 2014-04-17 | End: 2019-10-24 | Stop reason: CLARIF

## 2018-10-15 RX ORDER — MELOXICAM 15 MG/1
15 TABLET ORAL DAILY
Qty: 30 TABLET | Refills: 5 | Status: SHIPPED | OUTPATIENT
Start: 2018-10-15 | End: 2019-04-17

## 2018-10-15 RX ORDER — OMEPRAZOLE 40 MG/1
40 CAPSULE, DELAYED RELEASE ORAL DAILY
Qty: 90 CAPSULE | Refills: 2 | Status: SHIPPED | OUTPATIENT
Start: 2018-10-15 | End: 2019-10-24 | Stop reason: CLARIF

## 2018-10-15 NOTE — TELEPHONE ENCOUNTER
Rx request for Omeprazole 40 mg, Please review.  Thank you.     LOV: 3/20/18 colonoscopy   Last Refill:7/28/18

## 2018-10-15 NOTE — TELEPHONE ENCOUNTER
From: Danisha Dwyer  To: Morris Maya MD  Sent: 10/15/2018 3:20 PM CDT  Subject: Non-Urgent Medical Question    Hi. Wanted to let you know that the prescription for Omeprazole has been working wonderfully. I have not had heartburn since taking it.

## 2018-10-15 NOTE — PROGRESS NOTES
HPI:    Patient ID: Carmie Curling is a 58year old male.     HPI  Patient presents with:  Routine Physical: pt would like flu vaccine and possibly pneumonia vaccine    Past Medical History:   Diagnosis Date   • Bladder cancer ([facility] [address]     bladder CA    • [place] membrane, external ear and ear canal normal.   Left Ear: Tympanic membrane, external ear and ear canal normal.   Nose: Nose normal.   Mouth/Throat: Mucous membranes are normal.   Eyes: Conjunctivae, EOM and lids are normal. Pupils are equal, round, and cheri Urinalysis, Routine [E]      Vitamin D, 25-Hydroxy [E]      Uric Acid, Serum [E]      Flulaval 6 months and older 0.5 ml Quad PF [38497]      Meds This Visit:  Requested Prescriptions     Signed Prescriptions Disp Refills   • Meloxicam 15 MG Oral Tab 30

## 2018-11-14 ENCOUNTER — APPOINTMENT (OUTPATIENT)
Dept: LAB | Age: 63
End: 2018-11-14
Attending: UROLOGY
Payer: COMMERCIAL

## 2018-11-14 DIAGNOSIS — N20.0 KIDNEY STONE: ICD-10-CM

## 2018-11-14 PROCEDURE — 82340 ASSAY OF CALCIUM IN URINE: CPT

## 2018-11-14 PROCEDURE — 84560 ASSAY OF URINE/URIC ACID: CPT

## 2018-11-14 PROCEDURE — 84300 ASSAY OF URINE SODIUM: CPT

## 2018-11-14 PROCEDURE — 82507 ASSAY OF CITRATE: CPT

## 2018-11-14 PROCEDURE — 81003 URINALYSIS AUTO W/O SCOPE: CPT

## 2018-11-14 PROCEDURE — 84105 ASSAY OF URINE PHOSPHORUS: CPT

## 2018-11-14 PROCEDURE — 83945 ASSAY OF OXALATE: CPT

## 2018-11-15 ENCOUNTER — APPOINTMENT (OUTPATIENT)
Dept: LAB | Age: 63
End: 2018-11-15
Attending: UROLOGY
Payer: COMMERCIAL

## 2018-11-15 DIAGNOSIS — Z12.5 PROSTATE CANCER SCREENING: ICD-10-CM

## 2018-11-15 DIAGNOSIS — Z91.89 AT RISK FOR SIDE EFFECT OF MEDICATION: ICD-10-CM

## 2018-11-15 PROCEDURE — 80048 BASIC METABOLIC PNL TOTAL CA: CPT

## 2018-11-15 PROCEDURE — 36415 COLL VENOUS BLD VENIPUNCTURE: CPT

## 2018-12-03 ENCOUNTER — OFFICE VISIT (OUTPATIENT)
Dept: SURGERY | Facility: CLINIC | Age: 63
End: 2018-12-03
Payer: COMMERCIAL

## 2018-12-03 VITALS
HEART RATE: 63 BPM | BODY MASS INDEX: 33.57 KG/M2 | SYSTOLIC BLOOD PRESSURE: 130 MMHG | HEIGHT: 75 IN | TEMPERATURE: 99 F | DIASTOLIC BLOOD PRESSURE: 81 MMHG | WEIGHT: 270 LBS | RESPIRATION RATE: 16 BRPM

## 2018-12-03 DIAGNOSIS — C67.0 MALIGNANT NEOPLASM OF TRIGONE OF URINARY BLADDER (HCC): ICD-10-CM

## 2018-12-03 DIAGNOSIS — R82.991 HYPOCITRATURIA: Primary | ICD-10-CM

## 2018-12-03 DIAGNOSIS — M54.89 MIDLINE BACK PAIN, UNSPECIFIED BACK LOCATION, UNSPECIFIED CHRONICITY: ICD-10-CM

## 2018-12-03 DIAGNOSIS — Z12.5 PROSTATE CANCER SCREENING: ICD-10-CM

## 2018-12-03 DIAGNOSIS — R82.993 HYPERURICOSURIA: ICD-10-CM

## 2018-12-03 DIAGNOSIS — N20.0 KIDNEY STONE: ICD-10-CM

## 2018-12-03 DIAGNOSIS — R35.0 BENIGN PROSTATIC HYPERPLASIA WITH URINARY FREQUENCY: ICD-10-CM

## 2018-12-03 DIAGNOSIS — N13.30 HYDRONEPHROSIS, UNSPECIFIED HYDRONEPHROSIS TYPE: ICD-10-CM

## 2018-12-03 DIAGNOSIS — R35.1 NOCTURIA: ICD-10-CM

## 2018-12-03 DIAGNOSIS — N40.1 BENIGN PROSTATIC HYPERPLASIA WITH URINARY FREQUENCY: ICD-10-CM

## 2018-12-03 PROCEDURE — 99212 OFFICE O/P EST SF 10 MIN: CPT | Performed by: UROLOGY

## 2018-12-03 PROCEDURE — 99214 OFFICE O/P EST MOD 30 MIN: CPT | Performed by: UROLOGY

## 2018-12-03 RX ORDER — ALLOPURINOL 100 MG/1
100 TABLET ORAL 2 TIMES DAILY
Qty: 180 TABLET | Refills: 3 | Status: SHIPPED | OUTPATIENT
Start: 2018-12-03 | End: 2019-02-03

## 2018-12-03 NOTE — PATIENT INSTRUCTIONS
Salvador Oates M.D.      1.   To decrease urine uric acid levels, please avoid eating large quantities of red meat; try to scale back;: Presently you are at the upper level of normal for uric acid in your urine; October 2018

## 2018-12-03 NOTE — PROGRESS NOTES
HPI:    Patient ID: Lisa Francois is a 58year old male. Bladder Cancer  10/1/18: Cystoscopy, No tumors or stones or CIS of the bladder or bladder neck seen.  Bladder tumor unexpectedly found 9/13/17 during cystoscopy removal of bladder tumor, with left he feels \"mixed\" about his urinating problem. Hydronephrosis  He feels this is stable as he denies any flank pain. Hyperuricosuria   Pt does admit that he does not limit meat in his diet. Denies fhx of gout.  Pt is currently taking allopurinol 100 retrograde pyelogram, insertion of left ureteral stent; removal of unexpected finding of bladder tumor on the contralateral right side of the bladder; urethra diffusely smaller than typical and calibrated 18-20 [place]; moderate BPH obstruction; moderate hi MAIN OR   • CYSTOURETERO W/EXCISE TUMOR Left 09/2017    with stent and urethral dilitation   • ESOPHAGOGASTRODUODENOSCOPY (EGD) N/A 3/20/2018    Performed by Yasmeen Palumbo MD at [address] [place] [address] ENDOSCOPY   • GLOBAL ESWL KIDNEY  09/2017   • INGUINAL HERNIA REPAIR indurations   Musculoskeletal: Normal range of motion. Neurological: He is alert. Skin: Skin is dry. Psychiatric: His behavior is normal.   Nursing note and vitals reviewed.        12/03/18  1629   BP: 130/81   Pulse: 63   Resp: 16   Temp: 98.5 °F (36 probable right parapelvic renal cyst     10/10/2017 Renal/Bladder US = resolution of left hydronephrosis; left ureteral stent in place     9/12/2017 XR Abdomen = BOWEL GAS PATTERN:             There are no dilated loops of large or small bowel.  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 Plain KUB 02/06/2018 will not show stone due to radioopacity and US 02/06/2018 did not show stone.  Patient does state he has severe GERD and is on omeprazole prescribed by gastr score of 16, moderate voiding dysfunction category.  Patient feels this problem is stable; I discussed, explained, and answered questions on treatment options and patient understood and chooses to continue Alfuzosin 10 mg.     (Z12.5) Prostate cancer screen medical record entries made by the scribe were at my direction and in my presence. I have reviewed the chart and discharge instructions (if applicable) and agree that the record reflects my personal performance and is accurate and complete.   Maxine Guzmán

## 2019-01-03 ENCOUNTER — PATIENT MESSAGE (OUTPATIENT)
Dept: FAMILY MEDICINE CLINIC | Facility: CLINIC | Age: 64
End: 2019-01-03

## 2019-01-04 NOTE — TELEPHONE ENCOUNTER
From: Red Daily  To: Rachel Tavares DO  Sent: 1/3/2019 2:44 PM CST  Subject: Referral Request    Dear Dr. Arnoldo Montenegro: For the last couple days I've lost the majority of my hearing in my left ear. It's muffled and have a constant ringing.   Should I m

## 2019-01-07 ENCOUNTER — OFFICE VISIT (OUTPATIENT)
Dept: FAMILY MEDICINE CLINIC | Facility: CLINIC | Age: 64
End: 2019-01-07
Payer: COMMERCIAL

## 2019-01-07 VITALS
BODY MASS INDEX: 34.44 KG/M2 | WEIGHT: 277 LBS | SYSTOLIC BLOOD PRESSURE: 127 MMHG | DIASTOLIC BLOOD PRESSURE: 75 MMHG | TEMPERATURE: 99 F | HEIGHT: 75 IN | HEART RATE: 73 BPM

## 2019-01-07 DIAGNOSIS — H65.02 NON-RECURRENT ACUTE SEROUS OTITIS MEDIA OF LEFT EAR: Primary | ICD-10-CM

## 2019-01-07 PROCEDURE — 99213 OFFICE O/P EST LOW 20 MIN: CPT | Performed by: FAMILY MEDICINE

## 2019-01-07 PROCEDURE — 99212 OFFICE O/P EST SF 10 MIN: CPT | Performed by: FAMILY MEDICINE

## 2019-01-07 RX ORDER — AZITHROMYCIN 250 MG/1
TABLET, FILM COATED ORAL
Qty: 6 TABLET | Refills: 0 | Status: SHIPPED | OUTPATIENT
Start: 2019-01-07 | End: 2019-01-21 | Stop reason: ALTCHOICE

## 2019-01-07 NOTE — PROGRESS NOTES
HPI:    Patient ID: Jaydon Fuller is a 58year old male. HPI  Patient presents with:  Ringing In Ear: ringing on LT ear, lossing of hearing, since last 1/2/19, UTD ON FLU SHOT    Review of Systems   Constitutional: Negative.     HENT: Positive for ear p

## 2019-01-15 ENCOUNTER — TELEPHONE (OUTPATIENT)
Dept: FAMILY MEDICINE CLINIC | Facility: CLINIC | Age: 64
End: 2019-01-15

## 2019-01-15 NOTE — TELEPHONE ENCOUNTER
Advised to call triage on [facility]    Regarding: Visit Follow-up Question  Contact: [phone]  ----- Message from Huaat Generic sent at 1/14/2019  8:06 PM CST -----    Hi. Was in last week for my left ear which apparently is infected and swollen.     A

## 2019-01-21 ENCOUNTER — OFFICE VISIT (OUTPATIENT)
Dept: FAMILY MEDICINE CLINIC | Facility: CLINIC | Age: 64
End: 2019-01-21
Payer: COMMERCIAL

## 2019-01-21 VITALS
DIASTOLIC BLOOD PRESSURE: 75 MMHG | SYSTOLIC BLOOD PRESSURE: 119 MMHG | WEIGHT: 277 LBS | BODY MASS INDEX: 34.44 KG/M2 | TEMPERATURE: 98 F | HEART RATE: 68 BPM | HEIGHT: 75 IN

## 2019-01-21 DIAGNOSIS — H65.05 RECURRENT ACUTE SEROUS OTITIS MEDIA OF LEFT EAR: Primary | ICD-10-CM

## 2019-01-21 PROCEDURE — 99212 OFFICE O/P EST SF 10 MIN: CPT | Performed by: FAMILY MEDICINE

## 2019-01-21 PROCEDURE — 99213 OFFICE O/P EST LOW 20 MIN: CPT | Performed by: FAMILY MEDICINE

## 2019-01-21 RX ORDER — OFLOXACIN 3 MG/ML
5 SOLUTION AURICULAR (OTIC) DAILY
Qty: 1 BOTTLE | Refills: 0 | Status: SHIPPED | OUTPATIENT
Start: 2019-01-21 | End: 2019-10-24 | Stop reason: CLARIF

## 2019-01-21 NOTE — PROGRESS NOTES
HPI:    Patient ID: Morenita Schwartz is a 58year old male. HPI  Patient presents with:  Hearing Problem: LT ear unable to hear, meds prescribe did not help     Review of Systems   Constitutional: Negative. HENT: Positive for ear pain and hearing loss.

## 2019-01-24 RX ORDER — ALFUZOSIN HYDROCHLORIDE 10 MG/1
10 TABLET, EXTENDED RELEASE ORAL DAILY
Qty: 90 TABLET | Refills: 3 | Status: SHIPPED | OUTPATIENT
Start: 2019-01-24 | End: 2019-02-04

## 2019-01-24 NOTE — TELEPHONE ENCOUNTER
Pt LOV 12/3/18 with Dr. Lamar Bonilla, pt pharmacy requesting refill on alfuzosin. I copied and pasted part of PVK last note below.     3.  Increase allopurinol from 100 mg daily--increased to 100 mg tablet, 2 tablets daily for a total dose of 200 mg daily

## 2019-01-29 ENCOUNTER — APPOINTMENT (OUTPATIENT)
Dept: LAB | Age: 64
End: 2019-01-29
Attending: INTERNAL MEDICINE
Payer: COMMERCIAL

## 2019-01-29 DIAGNOSIS — C67.0 MALIGNANT NEOPLASM OF TRIGONE OF URINARY BLADDER (HCC): ICD-10-CM

## 2019-01-29 PROCEDURE — 88108 CYTOPATH CONCENTRATE TECH: CPT

## 2019-02-04 ENCOUNTER — OFFICE VISIT (OUTPATIENT)
Dept: SURGERY | Facility: CLINIC | Age: 64
End: 2019-02-04
Payer: COMMERCIAL

## 2019-02-04 VITALS
WEIGHT: 277 LBS | HEIGHT: 75 IN | TEMPERATURE: 98 F | HEART RATE: 77 BPM | BODY MASS INDEX: 34.44 KG/M2 | SYSTOLIC BLOOD PRESSURE: 110 MMHG | RESPIRATION RATE: 16 BRPM | DIASTOLIC BLOOD PRESSURE: 78 MMHG

## 2019-02-04 DIAGNOSIS — N20.0 KIDNEY STONE: ICD-10-CM

## 2019-02-04 DIAGNOSIS — N40.1 BENIGN PROSTATIC HYPERPLASIA WITH URINARY FREQUENCY: ICD-10-CM

## 2019-02-04 DIAGNOSIS — R35.0 BENIGN PROSTATIC HYPERPLASIA WITH URINARY FREQUENCY: ICD-10-CM

## 2019-02-04 DIAGNOSIS — R82.993 HYPERURICOSURIA: ICD-10-CM

## 2019-02-04 DIAGNOSIS — Z12.5 PROSTATE CANCER SCREENING: ICD-10-CM

## 2019-02-04 DIAGNOSIS — M54.89 MIDLINE BACK PAIN, UNSPECIFIED BACK LOCATION, UNSPECIFIED CHRONICITY: ICD-10-CM

## 2019-02-04 DIAGNOSIS — R10.32 LEFT LOWER QUADRANT PAIN: ICD-10-CM

## 2019-02-04 DIAGNOSIS — N13.30 HYDRONEPHROSIS, UNSPECIFIED HYDRONEPHROSIS TYPE: ICD-10-CM

## 2019-02-04 DIAGNOSIS — R35.1 NOCTURIA: ICD-10-CM

## 2019-02-04 DIAGNOSIS — C67.0 MALIGNANT NEOPLASM OF TRIGONE OF URINARY BLADDER (HCC): Primary | ICD-10-CM

## 2019-02-04 PROCEDURE — 52000 CYSTOURETHROSCOPY: CPT | Performed by: UROLOGY

## 2019-02-04 PROCEDURE — 99213 OFFICE O/P EST LOW 20 MIN: CPT | Performed by: UROLOGY

## 2019-02-04 PROCEDURE — 99212 OFFICE O/P EST SF 10 MIN: CPT | Performed by: UROLOGY

## 2019-02-04 RX ORDER — DIAZEPAM 5 MG/1
5 TABLET ORAL EVERY 6 HOURS PRN
Refills: 0 | Status: CANCELLED | OUTPATIENT
Start: 2019-02-04

## 2019-02-04 RX ORDER — ALFUZOSIN HYDROCHLORIDE 10 MG/1
10 TABLET, EXTENDED RELEASE ORAL DAILY
Qty: 90 TABLET | Refills: 3 | Status: SHIPPED | OUTPATIENT
Start: 2019-02-04 | End: 2019-06-14

## 2019-02-04 RX ORDER — DIAZEPAM 5 MG/1
TABLET ORAL
Qty: 9 TABLET | Refills: 0 | OUTPATIENT
Start: 2019-02-04 | End: 2019-06-10

## 2019-02-04 RX ORDER — ALLOPURINOL 100 MG/1
100 TABLET ORAL 2 TIMES DAILY
Qty: 180 TABLET | Refills: 3 | Status: SHIPPED | OUTPATIENT
Start: 2019-02-04 | End: 2019-06-10

## 2019-02-04 RX ORDER — CIPROFLOXACIN 500 MG/1
500 TABLET, FILM COATED ORAL ONCE
Status: DISCONTINUED | OUTPATIENT
Start: 2019-02-04 | End: 2019-10-24

## 2019-02-04 RX ORDER — DIAZEPAM 5 MG/1
5 TABLET ORAL DAILY
COMMUNITY
End: 2019-06-10

## 2019-02-04 NOTE — PATIENT INSTRUCTIONS
Instructions    1. If your urine is bloody, please drink enough liquids to dilute out the blood. If the urine doesn't show any signs of blood, you can drink your usual amount of liquids.  Once there are no signs of blood, you can likely restart daily aspiri

## 2019-02-04 NOTE — TELEPHONE ENCOUNTER
Pt LOV with Dr. Jaydon Conteh was today 2/4/18 pt pharmacy requesting refill on allopurinol if you agree please review and sign med, I copied and pasted part of PVK last note below.       3. Continue alfuzosin 10 mg daily     4.   Continue allopurinol 200 mg d

## 2019-02-04 NOTE — PROGRESS NOTES
PREOPERATIVE DIAGNOSIS:     Bladder Cancer of the right side of trigone 9/13/2017; no recurrence since      POSTOP DIAGNOSIS:               The same    PROCEDURE:              Cystoscopy              ANESTHESIA:     2% Xylocaine jelly local; Diazepam 15  m bladder cancer such as weight loss or loss of appetite or bone pain or new unresolving cough. Pt has a cigar once in a while. 2. Left Lower Quadrant Pain  Patient states intermittent left lower quadrant pain which typically happens at night.  When he d medical record  and    REVIEW CHART   Patient reports for prior kidney stone episodes with last one approximately 2012; episodes consisted of persistent flank pain and resolved on its own each time; patient never saw the stone, and prior to this admission, prostate 1-2+ enlarged, no palpable nodules or indurations; hypocitraturia--decides to proceed with diet changes; hyperuricosuria--decides to increase allopurinol 200 mg; malignant neoplasm of trigone of urinary bladder; 10/30/2018 Calculi composition = 10 Abdomen = No obvious renal calculi     10/27/2017 CT of the Abdomen and Pelvis without contrast = previously noted obstructing 1.3 cm left ureteropelvic junction calculus has resolved; there is mild persistent left pelviectasis, improved since prior exam. joints. Degenerative changes are seen in the bilateral hips. Bone island in the right pedicle of L5. CONCLUSION: 1.3 cm left renal pelvis calculus with severe left hydronephrosis. Hepatic steatosis without focal lesion.          DIAGNOSES &  DISCUSSION  (C treatment; patient understands all of this and decides to continue medication as prescribed.    (N13.30) Hydronephrosis, unspecified hydronephrosis type  On CT 10/27/2017 there was mild persistent left pelviectasis while left stent was in place and while s makes the color of the urine bright orange red. 3.  Continue alfuzosin 10 mg daily    4. Continue allopurinol 200 mg daily    5. Continue to avoid high protein diets    6. Continue to limit sodium and salt in your diet    7.   Cystoscopy with possible

## 2019-02-05 ENCOUNTER — TELEPHONE (OUTPATIENT)
Dept: SURGERY | Facility: CLINIC | Age: 64
End: 2019-02-05

## 2019-02-05 NOTE — TELEPHONE ENCOUNTER
I called the Diazepam script in to pt's [address] and spoke with pharmacist Asael. I also called pt and LM on his identified VM that I called in the script.

## 2019-02-05 NOTE — TELEPHONE ENCOUNTER
Pt was in yesterday 2/4/19 for office cystoscopy, pt did not receive his diazepam script for his next cystoscopy because Dr. Rena Juarez computer was down-not working, so Dr. Rena Juarez told pt we will phone it in to his pharmacy. Routing to RN's to call into pharmacy.

## 2019-03-05 ENCOUNTER — OFFICE VISIT (OUTPATIENT)
Dept: OTOLARYNGOLOGY | Facility: CLINIC | Age: 64
End: 2019-03-05
Payer: COMMERCIAL

## 2019-03-05 ENCOUNTER — OFFICE VISIT (OUTPATIENT)
Dept: AUDIOLOGY | Facility: CLINIC | Age: 64
End: 2019-03-05
Payer: COMMERCIAL

## 2019-03-05 VITALS — HEART RATE: 67 BPM | DIASTOLIC BLOOD PRESSURE: 80 MMHG | TEMPERATURE: 98 F | SYSTOLIC BLOOD PRESSURE: 133 MMHG

## 2019-03-05 DIAGNOSIS — H91.22 SUDDEN LEFT HEARING LOSS: Primary | ICD-10-CM

## 2019-03-05 DIAGNOSIS — H90.3 ASYMMETRIC SNHL (SENSORINEURAL HEARING LOSS): Primary | ICD-10-CM

## 2019-03-05 PROCEDURE — 92567 TYMPANOMETRY: CPT | Performed by: AUDIOLOGIST

## 2019-03-05 PROCEDURE — 92557 COMPREHENSIVE HEARING TEST: CPT | Performed by: AUDIOLOGIST

## 2019-03-05 PROCEDURE — 99212 OFFICE O/P EST SF 10 MIN: CPT | Performed by: OTOLARYNGOLOGY

## 2019-03-05 PROCEDURE — 99243 OFF/OP CNSLTJ NEW/EST LOW 30: CPT | Performed by: OTOLARYNGOLOGY

## 2019-03-05 RX ORDER — PREDNISONE 10 MG/1
TABLET ORAL
Qty: 55 TABLET | Refills: 0 | Status: SHIPPED | OUTPATIENT
Start: 2019-03-05 | End: 2019-06-13 | Stop reason: ALTCHOICE

## 2019-03-06 NOTE — PROGRESS NOTES
AUDIOGRAM     Morenita Schwartz was referred for testing by [employer] for decreased hearing in the left ear   5/28/1955  [id number]        Otoscopic inspection: right ear no cerumen; left ear no cerumen.        Tests/Procedures  Patient was tested

## 2019-03-06 NOTE — PROGRESS NOTES
[facility], [facility] - Gastroenterology                                                                                                  Clinic Progress Note    Patient pr COLONOSCOPY N/A 3/20/2018    Performed by Yassine Mendoza MD at [place] [address] Left 9/13/2017    Performed by Rene Moon MD at [address] [place] [address] OR   • [place] Left 9/13/2017    Performed by Rene Moon,  Disp: 9 tablet Rfl: 0   hydrocortisone 2.5 % External Ointment  Disp:  Rfl: 0   ofloxacin 0.3 % Otic Solution Place 5 drops into the left ear daily.  One week Disp: 1 Bottle Rfl: 0   Omeprazole 40 MG Oral Capsule Delayed Release Take 1 capsule (40 mg given its resolution without associated red flags, no further evaluation at this time is recommended, but if it returns would plan for CT abdomen/pelvis to evaluate for diverticulitis.  Also, his symptoms of GERD are controlled on his PPI once daily which h

## 2019-03-06 NOTE — PROGRESS NOTES
Fabby Blake is a 58year old male. Patient presents with:  Ear Problem: left hearing loss for 7 weeks    HPI:   He reports that about 7 weeks ago he suddenly noticed that his hearing was diminished in his left ear. There was some mild pain.   He was hav Smoking status: Never Smoker      Smokeless tobacco: Never Used      Tobacco comment: occasional cigar    Alcohol use:  Yes      Alcohol/week: 1.2 oz      Types: 2 Glasses of wine per week      Comment: occasionally    Drug use: No       REVIEW OF SYSTEM me afterwards for repeat audiogram.      The patient indicates understanding of these issues and agrees to the plan. Cristofer Garza MD  3/6/2019  6:03 AM

## 2019-03-07 ENCOUNTER — OFFICE VISIT (OUTPATIENT)
Dept: GASTROENTEROLOGY | Facility: CLINIC | Age: 64
End: 2019-03-07
Payer: COMMERCIAL

## 2019-03-07 ENCOUNTER — TELEPHONE (OUTPATIENT)
Dept: OTOLARYNGOLOGY | Facility: CLINIC | Age: 64
End: 2019-03-07

## 2019-03-07 VITALS
WEIGHT: 284 LBS | BODY MASS INDEX: 35.31 KG/M2 | SYSTOLIC BLOOD PRESSURE: 135 MMHG | DIASTOLIC BLOOD PRESSURE: 85 MMHG | HEIGHT: 75 IN | HEART RATE: 70 BPM

## 2019-03-07 DIAGNOSIS — Z86.010 HISTORY OF ADENOMATOUS POLYP OF COLON: ICD-10-CM

## 2019-03-07 DIAGNOSIS — K21.9 GASTROESOPHAGEAL REFLUX DISEASE WITHOUT ESOPHAGITIS: ICD-10-CM

## 2019-03-07 DIAGNOSIS — R10.32 LLQ PAIN: Primary | ICD-10-CM

## 2019-03-07 PROCEDURE — 99214 OFFICE O/P EST MOD 30 MIN: CPT | Performed by: INTERNAL MEDICINE

## 2019-03-07 NOTE — PATIENT INSTRUCTIONS
1. If the pain returns, call me and we will order a CT scan of the abdomen and pelvis    2. Continue your pantoprazole/protonix. Let me know if you run out    3. Plan for your colonoscopy in March 2021    4.  Call me if you have any quesitons

## 2019-03-18 ENCOUNTER — OFFICE VISIT (OUTPATIENT)
Dept: ORTHOPEDICS CLINIC | Facility: CLINIC | Age: 64
End: 2019-03-18
Payer: COMMERCIAL

## 2019-03-18 ENCOUNTER — OFFICE VISIT (OUTPATIENT)
Dept: SURGERY | Facility: CLINIC | Age: 64
End: 2019-03-18
Payer: COMMERCIAL

## 2019-03-18 VITALS — BODY MASS INDEX: 35.31 KG/M2 | WEIGHT: 284 LBS | HEIGHT: 75 IN

## 2019-03-18 VITALS — RESPIRATION RATE: 20 BRPM | HEART RATE: 65 BPM | DIASTOLIC BLOOD PRESSURE: 83 MMHG | SYSTOLIC BLOOD PRESSURE: 131 MMHG

## 2019-03-18 DIAGNOSIS — D17.22 LIPOMA OF LEFT UPPER EXTREMITY: Primary | ICD-10-CM

## 2019-03-18 DIAGNOSIS — M17.0 PRIMARY OSTEOARTHRITIS OF BOTH KNEES: Primary | ICD-10-CM

## 2019-03-18 PROCEDURE — 99212 OFFICE O/P EST SF 10 MIN: CPT | Performed by: SURGERY

## 2019-03-18 PROCEDURE — 99213 OFFICE O/P EST LOW 20 MIN: CPT | Performed by: ORTHOPAEDIC SURGERY

## 2019-03-18 PROCEDURE — 99212 OFFICE O/P EST SF 10 MIN: CPT | Performed by: ORTHOPAEDIC SURGERY

## 2019-03-18 PROCEDURE — 99214 OFFICE O/P EST MOD 30 MIN: CPT | Performed by: SURGERY

## 2019-03-18 NOTE — PROGRESS NOTES
NURSING INTAKE COMMENTS: Patient presents with:  Knee Pain: Bilateral 1 year f/u - stastes he has pain but less since he is on new arthritic medication from the PCP - he is gooing skiying next week - rates pain as 5/95 with certain movements       HPI: Dannis Friday Medications on File Prior to Visit:  Polyethyl Glycol-Propyl Glycol (SYSTANE OP) Apply to eye.  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   Hip motion causes no complaints no calf tenderness his opposite kne

## 2019-03-19 NOTE — H&P
History and Physical      Citlaly Petit is a 58year old male. HPI   Patient presents with:  Lump: Pt states he noticed lump on left forearm a few wks ago. Pt c/o tenderness with pressure.          HPI  See notes 11/2017 and 10/2016, no breast compla tablet (100 mg total) by mouth 2 (two) times daily. Disp: 180 tablet Rfl: 3   Alfuzosin HCl ER 10 MG Oral Tablet 24 Hr Take 1 tablet (10 mg total) by mouth daily. Disp: 90 tablet Rfl: 3   diazepam 5 MG Oral Tab Take 5 mg by mouth daily.  Disp:  Rfl:    mendez HPI.    PHYSICAL EXAM   Ht 6' 3\" (1.905 m)   Wt 284 lb (128.8 kg)   BMI 35.50 kg/m²  No LMP for male patient.   Constitutional: appears well hydrated alert and responsive no acute distress noted  Head/Face: normocephalic  Nose/Mouth/Throat: nose and throat

## 2019-03-22 ENCOUNTER — OFFICE VISIT (OUTPATIENT)
Dept: AUDIOLOGY | Facility: CLINIC | Age: 64
End: 2019-03-22
Payer: COMMERCIAL

## 2019-03-22 ENCOUNTER — OFFICE VISIT (OUTPATIENT)
Dept: OTOLARYNGOLOGY | Facility: CLINIC | Age: 64
End: 2019-03-22
Payer: COMMERCIAL

## 2019-03-22 ENCOUNTER — TELEPHONE (OUTPATIENT)
Dept: OTOLARYNGOLOGY | Facility: CLINIC | Age: 64
End: 2019-03-22

## 2019-03-22 VITALS
HEIGHT: 74 IN | SYSTOLIC BLOOD PRESSURE: 132 MMHG | TEMPERATURE: 99 F | DIASTOLIC BLOOD PRESSURE: 82 MMHG | BODY MASS INDEX: 36.32 KG/M2 | WEIGHT: 283 LBS

## 2019-03-22 DIAGNOSIS — H91.90 HEARING LOSS, UNSPECIFIED HEARING LOSS TYPE, UNSPECIFIED LATERALITY: Primary | ICD-10-CM

## 2019-03-22 DIAGNOSIS — H90.42 SENSORINEURAL HEARING LOSS (SNHL) OF LEFT EAR WITH UNRESTRICTED HEARING OF RIGHT EAR: Primary | ICD-10-CM

## 2019-03-22 PROCEDURE — 92557 COMPREHENSIVE HEARING TEST: CPT | Performed by: AUDIOLOGIST

## 2019-03-22 PROCEDURE — 99213 OFFICE O/P EST LOW 20 MIN: CPT | Performed by: OTOLARYNGOLOGY

## 2019-03-22 PROCEDURE — 99212 OFFICE O/P EST SF 10 MIN: CPT | Performed by: OTOLARYNGOLOGY

## 2019-03-22 NOTE — TELEPHONE ENCOUNTER
Called and spoke with the pt regarding approved prior authorization for MRI IACS with authorization A [id number] valid from 3/22/19 until 5/6/19, case number [id number], advised pt to call his insurance company to verify out of the pocket expenses and co

## 2019-03-22 NOTE — PROGRESS NOTES
Rae Pompa is a 58year old male. Patient presents with:  Ear Problem: sudden learing loss: pt states still has muffled hearing, ringing in left ear, no improvement    HPI:   He is in follow-up of a sudden sensorineural hearing loss in his left ear.   H cigar    Alcohol use:  Yes      Alcohol/week: 1.2 oz      Types: 2 Glasses of wine per week      Comment: occasionally    Drug use: No       REVIEW OF SYSTEMS:   GENERAL HEALTH: feels well otherwise  GENERAL : denies fever, chills, sweats, weight loss, weig central pathology. If negative I would recommend a repeat audiogram in about 6 months. - OP REFERRAL TO AUDIOLOGY  - MRI IACS (W+WO) (CPT=70553); Future      The patient indicates understanding of these issues and agrees to the plan. Cristofer Smart,

## 2019-03-23 NOTE — PROGRESS NOTES
AUDIOLOGY REPORT      Ashley Guillermo is a 58year old male     Referring Provider: Humera Serrano   YOB: 1955  Medical Record: [id number]      Patient Hearing History:  Patient is seen today for follow up testing. Last audiogram was 3-5-19.

## 2019-04-04 ENCOUNTER — HOSPITAL ENCOUNTER (OUTPATIENT)
Age: 64
Discharge: HOME | End: 2019-04-04
Attending: FAMILY MEDICINE
Payer: COMMERCIAL

## 2019-04-04 VITALS
HEIGHT: 75 IN | OXYGEN SATURATION: 97 % | RESPIRATION RATE: 20 BRPM | WEIGHT: 267 LBS | HEART RATE: 83 BPM | TEMPERATURE: 99 F | DIASTOLIC BLOOD PRESSURE: 84 MMHG | BODY MASS INDEX: 33.2 KG/M2 | SYSTOLIC BLOOD PRESSURE: 142 MMHG

## 2019-04-04 DIAGNOSIS — J11.1 INFLUENZA: Primary | ICD-10-CM

## 2019-04-04 PROCEDURE — 99213 OFFICE O/P EST LOW 20 MIN: CPT

## 2019-04-04 PROCEDURE — 87502 INFLUENZA DNA AMP PROBE: CPT | Performed by: FAMILY MEDICINE

## 2019-04-04 PROCEDURE — 99214 OFFICE O/P EST MOD 30 MIN: CPT

## 2019-04-04 RX ORDER — OSELTAMIVIR PHOSPHATE 75 MG/1
75 CAPSULE ORAL 2 TIMES DAILY
Qty: 10 CAPSULE | Refills: 0 | Status: SHIPPED | OUTPATIENT
Start: 2019-04-04 | End: 2020-07-30 | Stop reason: ALTCHOICE

## 2019-04-04 NOTE — ED INITIAL ASSESSMENT (HPI)
Pt states being in bed for 2 days lots of nasal congestion. PT states having a deep cough that has been now causing him chest pain when coughing. Pt states lots of fatigue.  Pt states having an ear infection 8 weeks ago that he was given antibiotics and thalia

## 2019-04-04 NOTE — ED PROVIDER NOTES
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

## 2019-04-15 ENCOUNTER — HOSPITAL ENCOUNTER (OUTPATIENT)
Dept: MRI IMAGING | Facility: HOSPITAL | Age: 64
Discharge: HOME | End: 2019-04-15
Attending: OTOLARYNGOLOGY
Payer: COMMERCIAL

## 2019-04-15 DIAGNOSIS — H91.90 HEARING LOSS, UNSPECIFIED HEARING LOSS TYPE, UNSPECIFIED LATERALITY: ICD-10-CM

## 2019-04-15 PROCEDURE — 70553 MRI BRAIN STEM W/O & W/DYE: CPT | Performed by: OTOLARYNGOLOGY

## 2019-04-15 PROCEDURE — A9575 INJ GADOTERATE MEGLUMI 0.1ML: HCPCS | Performed by: OTOLARYNGOLOGY

## 2019-04-15 PROCEDURE — 82565 ASSAY OF CREATININE: CPT

## 2019-04-19 RX ORDER — MELOXICAM 15 MG/1
TABLET ORAL
Qty: 90 TABLET | Refills: 1 | Status: SHIPPED | OUTPATIENT
Start: 2019-04-19 | End: 2019-06-11

## 2019-04-20 NOTE — TELEPHONE ENCOUNTER
Refill passed per [facility], [facility] protocol.   Refill Protocol Appointment Criteria  · Appointment scheduled in the past 6 months or in the next 3 months  Recent Outpatient Visits            4 weeks ago Sensorineural hearing loss (SNHL) of left ear with unre

## 2019-05-06 ENCOUNTER — TELEPHONE (OUTPATIENT)
Dept: SURGERY | Facility: CLINIC | Age: 64
End: 2019-05-06

## 2019-05-06 NOTE — TELEPHONE ENCOUNTER
Patient scheduled for Excisional Biopsy of the Left Forearm 5/23/2019. From: Navya Campos  To: Marcelo Reina MD  Sent: 5/1/2019  7:07 AM CDT  Subject: Visit Follow-up Question    Good morning.     On my last visit we identified some fatty tissue o

## 2019-06-05 ENCOUNTER — PATIENT MESSAGE (OUTPATIENT)
Dept: FAMILY MEDICINE CLINIC | Facility: CLINIC | Age: 64
End: 2019-06-05

## 2019-06-05 ENCOUNTER — TELEPHONE (OUTPATIENT)
Dept: SURGERY | Facility: CLINIC | Age: 64
End: 2019-06-05

## 2019-06-05 NOTE — TELEPHONE ENCOUNTER
From: Marquis Daniels  To: Chi Powell DO  Sent: 6/5/2019 3:59 PM CDT  Subject: Test Results Question    I'm still having hearing problems in left ear.  Ear specialist had me do an MRI and nothing showed up in ear canal.  However, this was identified:

## 2019-06-05 NOTE — TELEPHONE ENCOUNTER
Received results from [employer], discussed with Dr. Denilson Jorge. I called pt, left message on identified voice mail, Pathology results- benign, lipoma.

## 2019-06-07 ENCOUNTER — TELEPHONE (OUTPATIENT)
Dept: GASTROENTEROLOGY | Facility: CLINIC | Age: 64
End: 2019-06-07

## 2019-06-07 RX ORDER — OMEPRAZOLE 40 MG/1
40 CAPSULE, DELAYED RELEASE ORAL DAILY
Qty: 90 CAPSULE | Refills: 3 | Status: SHIPPED | OUTPATIENT
Start: 2019-06-07 | End: 2020-03-23

## 2019-06-07 NOTE — TELEPHONE ENCOUNTER
Current Outpatient Medications:  Omeprazole 40 MG Oral Capsule Delayed Release Take 1 capsule (40 mg total) by mouth daily.  Disp: 90 capsule Rfl: 2

## 2019-06-10 ENCOUNTER — OFFICE VISIT (OUTPATIENT)
Dept: SURGERY | Facility: CLINIC | Age: 64
End: 2019-06-10
Payer: COMMERCIAL

## 2019-06-10 VITALS
SYSTOLIC BLOOD PRESSURE: 122 MMHG | WEIGHT: 276 LBS | HEART RATE: 80 BPM | DIASTOLIC BLOOD PRESSURE: 78 MMHG | BODY MASS INDEX: 34.32 KG/M2 | TEMPERATURE: 98 F | RESPIRATION RATE: 16 BRPM | HEIGHT: 75 IN

## 2019-06-10 DIAGNOSIS — R10.32 LEFT LOWER QUADRANT PAIN: ICD-10-CM

## 2019-06-10 DIAGNOSIS — R35.0 BENIGN PROSTATIC HYPERPLASIA WITH URINARY FREQUENCY: ICD-10-CM

## 2019-06-10 DIAGNOSIS — N20.0 KIDNEY STONE: ICD-10-CM

## 2019-06-10 DIAGNOSIS — N13.30 HYDRONEPHROSIS, UNSPECIFIED HYDRONEPHROSIS TYPE: ICD-10-CM

## 2019-06-10 DIAGNOSIS — M54.89 MIDLINE BACK PAIN, UNSPECIFIED BACK LOCATION, UNSPECIFIED CHRONICITY: ICD-10-CM

## 2019-06-10 DIAGNOSIS — R35.1 NOCTURIA: ICD-10-CM

## 2019-06-10 DIAGNOSIS — R82.993 HYPERURICOSURIA: ICD-10-CM

## 2019-06-10 DIAGNOSIS — C67.0 MALIGNANT NEOPLASM OF TRIGONE OF URINARY BLADDER (HCC): Primary | ICD-10-CM

## 2019-06-10 DIAGNOSIS — N40.1 BENIGN PROSTATIC HYPERPLASIA WITH URINARY FREQUENCY: ICD-10-CM

## 2019-06-10 DIAGNOSIS — Z12.5 PROSTATE CANCER SCREENING: ICD-10-CM

## 2019-06-10 PROCEDURE — G0463 HOSPITAL OUTPT CLINIC VISIT: HCPCS | Performed by: UROLOGY

## 2019-06-10 PROCEDURE — 52000 CYSTOURETHROSCOPY: CPT | Performed by: UROLOGY

## 2019-06-10 PROCEDURE — 99213 OFFICE O/P EST LOW 20 MIN: CPT | Performed by: UROLOGY

## 2019-06-10 RX ORDER — DIAZEPAM 5 MG/1
TABLET ORAL
Qty: 3 TABLET | Refills: 0 | Status: SHIPPED | OUTPATIENT
Start: 2019-06-10 | End: 2020-03-02

## 2019-06-10 RX ORDER — CIPROFLOXACIN 500 MG/1
500 TABLET, FILM COATED ORAL ONCE
Status: COMPLETED | OUTPATIENT
Start: 2019-06-10 | End: 2019-06-10

## 2019-06-10 RX ORDER — ALLOPURINOL 100 MG/1
100 TABLET ORAL 2 TIMES DAILY
Qty: 180 TABLET | Refills: 3 | Status: SHIPPED | OUTPATIENT
Start: 2019-06-10 | End: 2020-03-23

## 2019-06-10 RX ADMIN — CIPROFLOXACIN 500 MG: 500 TABLET, FILM COATED ORAL at 12:55:00

## 2019-06-10 NOTE — PATIENT INSTRUCTIONS
Laura Da Silva M.D.    1. If your urine is bloody, please drink enough liquids to dilute out the blood. If the urine doesn't show any signs of blood, you can drink your usual amount of liquids.  Once there are no signs of blood, y

## 2019-06-10 NOTE — PROGRESS NOTES
PREOPERATIVE DIAGNOSIS:     Bladder Cancer of the right side of trigone 9/13/2017; no recurrence since      POSTOP DIAGNOSIS:               The same; no evidence of recurrence    PROCEDURE:              Cystoscopy     ANESTHESIA:     2% Xylocaine jelly loc bladder cancer such as weight loss, loss of appetite, bone pain, or new unresolving cough. Patient smokes a cigar once in a while. 2. Left Lower Quadrant Pain  Patient states intermittent left lower quadrant pain which typically happens at night.  When admission, never had kidney stone surgery. Patient reports history of pyelonephritis as a child but denies any UTIs after that. No prior urological surgery before this admission.   9/12/17 went to [address] [place] [address] ER for left lower quadrant pain; urinalysis showed jennifer allopurinol 200 mg;  On CT 10/27/2017 there was mild persistent left pelviectasis while left stent was in place; benign prostatic hyperplasia--decides to continue observation; nocturia--chooses to continue Alfuzosin 10 mg; to decrease urine uric acid levels extrarenal pelvis unchanged; Normal kidneys    02/06/2018 XR Abdomen = No obvious renal calculi     10/27/2017 CT of the Abdomen and Pelvis without contrast = previously noted obstructing 1.3 cm left ureteropelvic junction calculus has resolved; there is m spine. Degenerative changes are seen within the sacroiliac joints. Degenerative changes are seen in the bilateral hips. Bone island in the right pedicle of L5. CONCLUSION: 1.3 cm left renal pelvis calculus with severe left hydronephrosis.   Hepatic steatosi prescribed.    (N13.30) Hydronephrosis, unspecified hydronephrosis type  On CT 10/27/2017 there was mild persistent left pelviectasis while left stent was in place and while stent was draining left kidney very well, and that suggests that patient has perma large amounts of water--  will help prevent recurrence of kidney stones    8. Cystoscopy with possible bladder biopsy in 4 months; diazepam 15 mg upon arrival at our office for the procedure and you must have a     9.   Please hold aspirin and NSAIDs

## 2019-06-11 NOTE — TELEPHONE ENCOUNTER
Patient change to Chris Pepper , needs a new RX for [employer]     Med pended for your review / approval

## 2019-06-13 ENCOUNTER — OFFICE VISIT (OUTPATIENT)
Dept: OTOLARYNGOLOGY | Facility: CLINIC | Age: 64
End: 2019-06-13
Payer: COMMERCIAL

## 2019-06-13 ENCOUNTER — OFFICE VISIT (OUTPATIENT)
Dept: AUDIOLOGY | Facility: CLINIC | Age: 64
End: 2019-06-13
Payer: COMMERCIAL

## 2019-06-13 VITALS
WEIGHT: 276 LBS | DIASTOLIC BLOOD PRESSURE: 90 MMHG | HEIGHT: 75 IN | TEMPERATURE: 99 F | BODY MASS INDEX: 34.32 KG/M2 | SYSTOLIC BLOOD PRESSURE: 143 MMHG

## 2019-06-13 DIAGNOSIS — H90.42 SENSORINEURAL HEARING LOSS (SNHL) OF LEFT EAR WITH UNRESTRICTED HEARING OF RIGHT EAR: Primary | ICD-10-CM

## 2019-06-13 DIAGNOSIS — H91.92 HEARING LOSS OF LEFT EAR, UNSPECIFIED HEARING LOSS TYPE: Primary | ICD-10-CM

## 2019-06-13 DIAGNOSIS — J34.2 DEVIATED SEPTUM: ICD-10-CM

## 2019-06-13 PROCEDURE — 99213 OFFICE O/P EST LOW 20 MIN: CPT | Performed by: OTOLARYNGOLOGY

## 2019-06-13 PROCEDURE — 92557 COMPREHENSIVE HEARING TEST: CPT | Performed by: AUDIOLOGIST

## 2019-06-13 PROCEDURE — G0463 HOSPITAL OUTPT CLINIC VISIT: HCPCS | Performed by: OTOLARYNGOLOGY

## 2019-06-13 PROCEDURE — 92567 TYMPANOMETRY: CPT | Performed by: AUDIOLOGIST

## 2019-06-13 RX ORDER — MELOXICAM 15 MG/1
TABLET ORAL
Qty: 90 TABLET | Refills: 3 | Status: SHIPPED | OUTPATIENT
Start: 2019-06-13 | End: 2020-03-23

## 2019-06-13 NOTE — PROGRESS NOTES
Liam Tripathi is a 71year old male. Patient presents with:   Follow - Up: hearing loss, pt noticed improvement in hearing but after pt had MRI done on 4-15-19 decreased hearing the left ear     HPI:   His hearing had improved following his course of stero History    Tobacco Use      Smoking status: Never Smoker      Smokeless tobacco: Never Used      Tobacco comment: occasional cigar    Alcohol use:  Yes      Alcohol/week: 1.2 oz      Types: 2 Glasses of wine per week      Comment: occasionally    Drug use: hearing loss which is stable compared to his previous exam.  He feels that there is been possibly some improvement but it did worsen temporarily.   As he is still having a great deal of difficulty I recommended he consider hearing aid for his left ear  - OP

## 2019-06-14 ENCOUNTER — TELEPHONE (OUTPATIENT)
Dept: SURGERY | Facility: CLINIC | Age: 64
End: 2019-06-14

## 2019-06-14 RX ORDER — ALFUZOSIN HYDROCHLORIDE 10 MG/1
10 TABLET, EXTENDED RELEASE ORAL DAILY
Qty: 90 TABLET | Refills: 2 | Status: SHIPPED | OUTPATIENT
Start: 2019-06-14 | End: 2020-02-20

## 2019-06-14 NOTE — TELEPHONE ENCOUNTER
Last office visit 6/10/19. Last Rx refill 2/4/19. Patient request mail order pharmacy.  Next appt is scheduled for Oct.

## 2019-06-14 NOTE — PROGRESS NOTES
AUDIOLOGY REPORT      Winter Patiño is a 71year old male     Referring Provider: Lucy Gutiérrez   YOB: 1955  Medical Record: [id number]    Patient Hearing History:  Patient is seen today for follow up testing.    Last audiogram was 3-22-19

## 2019-10-14 ENCOUNTER — OFFICE VISIT (OUTPATIENT)
Dept: SURGERY | Facility: CLINIC | Age: 64
End: 2019-10-14
Payer: COMMERCIAL

## 2019-10-14 VITALS
SYSTOLIC BLOOD PRESSURE: 138 MMHG | HEIGHT: 75 IN | BODY MASS INDEX: 34.32 KG/M2 | HEART RATE: 81 BPM | DIASTOLIC BLOOD PRESSURE: 70 MMHG | WEIGHT: 276 LBS

## 2019-10-14 DIAGNOSIS — Z01.818 PREOP TESTING: ICD-10-CM

## 2019-10-14 DIAGNOSIS — N40.1 BENIGN PROSTATIC HYPERPLASIA WITH URINARY FREQUENCY: ICD-10-CM

## 2019-10-14 DIAGNOSIS — N20.0 KIDNEY STONE: ICD-10-CM

## 2019-10-14 DIAGNOSIS — C67.0 MALIGNANT NEOPLASM OF TRIGONE OF URINARY BLADDER (HCC): Primary | ICD-10-CM

## 2019-10-14 DIAGNOSIS — M54.89 MIDLINE BACK PAIN, UNSPECIFIED BACK LOCATION, UNSPECIFIED CHRONICITY: ICD-10-CM

## 2019-10-14 DIAGNOSIS — R35.1 NOCTURIA: ICD-10-CM

## 2019-10-14 DIAGNOSIS — N13.30 HYDRONEPHROSIS, UNSPECIFIED HYDRONEPHROSIS TYPE: ICD-10-CM

## 2019-10-14 DIAGNOSIS — R35.0 BENIGN PROSTATIC HYPERPLASIA WITH URINARY FREQUENCY: ICD-10-CM

## 2019-10-14 DIAGNOSIS — R82.993 HYPERURICOSURIA: ICD-10-CM

## 2019-10-14 DIAGNOSIS — Z12.5 PROSTATE CANCER SCREENING: ICD-10-CM

## 2019-10-14 PROCEDURE — G0463 HOSPITAL OUTPT CLINIC VISIT: HCPCS | Performed by: UROLOGY

## 2019-10-14 PROCEDURE — 52000 CYSTOURETHROSCOPY: CPT | Performed by: UROLOGY

## 2019-10-14 PROCEDURE — 99213 OFFICE O/P EST LOW 20 MIN: CPT | Performed by: UROLOGY

## 2019-10-14 RX ORDER — CIPROFLOXACIN 500 MG/1
500 TABLET, FILM COATED ORAL ONCE
Status: COMPLETED | OUTPATIENT
Start: 2019-10-14 | End: 2019-10-14

## 2019-10-14 RX ADMIN — CIPROFLOXACIN 500 MG: 500 TABLET, FILM COATED ORAL at 15:15:00

## 2019-10-14 NOTE — PATIENT INSTRUCTIONS
Sandra Syed M.D.    1. If your urine is bloody, please drink enough liquids to dilute out the blood. If the urine doesn't show any signs of blood, you can drink your usual amount of liquids.  Once there are no signs o

## 2019-10-14 NOTE — PROGRESS NOTES
PREOPERATIVE DIAGNOSIS:     Bladder Cancer of the right side of trigone 9/13/2017; no recurrence since      POSTOP DIAGNOSIS:               The same; no evidence of recurrence    PROCEDURE:              Cystoscopy     ANESTHESIA:     2% Xylocaine jelly loc advanced or metastatic bladder cancer such as weight loss, loss of appetite, bone pain, or new unresolving cough. Patient smokes a cigar once in a while.       2. Left Lower Quadrant Pain  Patient states intermittent left lower quadrant pain which typically and prior to this admission, never had kidney stone surgery. Patient reports history of pyelonephritis as a child but denies any UTIs after that. No prior urological surgery before this admission.   9/12/17 went to [facility] ER for left lower quadrant pain; uri like to increase allopurinol 200 mg;  On CT 10/27/2017 there was mild persistent left pelviectasis while left stent was in place; benign prostatic hyperplasia--decides to continue observation; nocturia--chooses to continue Alfuzosin 10 mg; to decrease urine Creatinine = 0.7; GFR > 60  01/30/2019 Urine Cytology = Benign, inflammatory, reactive or reparative changes  11/15/2018 PSA = 1.0 (probability of cancer 1%)  06/30/2018 Urine Cytology = Negative for High grade urothelial carcinoma  69/06/0254 Metabolic 24 within the left collecting system when compared to the right. Patient's known left renal calculus is not clearly seen on radiographs and may be radiolucent. Contrast is seen within the bladder. Right inguinal fasteners.  CONCLUSION: Retained contrast within decides to undergo cystoscopy, possible biopsy in 6 months with oral diazepam 15 mg with urine cytology before the procedure.  I fully discussed with the patient preoperative preparations and post operative care--please see instructions below.     (N20.0) K KAMLA, prostate not enlarged, no palpable nodules or indurations.  He feels this is stable and decides to observe.    (R35.1) Nocturia  The patient feels this problem is stable and chooses to continue Alfuzosin 10 mg.     (Z12.5) Prostate cancer screening  11 excluding the time spent explaining the findings of the procedure to the patient, I spent an additional I spent an additional 15  minutes with patient explaining treatment options, answering questions about treatment and coordinating care.         By rebecca

## 2019-10-24 ENCOUNTER — OFFICE VISIT (OUTPATIENT)
Dept: FAMILY MEDICINE CLINIC | Facility: CLINIC | Age: 64
End: 2019-10-24
Payer: COMMERCIAL

## 2019-10-24 VITALS
SYSTOLIC BLOOD PRESSURE: 134 MMHG | TEMPERATURE: 98 F | HEIGHT: 75 IN | BODY MASS INDEX: 34.82 KG/M2 | WEIGHT: 280 LBS | HEART RATE: 76 BPM | DIASTOLIC BLOOD PRESSURE: 75 MMHG

## 2019-10-24 DIAGNOSIS — Z87.39 HISTORY OF GOUT: ICD-10-CM

## 2019-10-24 DIAGNOSIS — Z12.5 PROSTATE CANCER SCREENING: ICD-10-CM

## 2019-10-24 DIAGNOSIS — G47.33 OSA (OBSTRUCTIVE SLEEP APNEA): ICD-10-CM

## 2019-10-24 DIAGNOSIS — D49.4 BLADDER TUMOR: ICD-10-CM

## 2019-10-24 DIAGNOSIS — R31.29 MICROHEMATURIA: ICD-10-CM

## 2019-10-24 DIAGNOSIS — Z00.00 ENCOUNTER FOR ANNUAL HEALTH EXAMINATION: ICD-10-CM

## 2019-10-24 DIAGNOSIS — R79.89 LOW VITAMIN D LEVEL: ICD-10-CM

## 2019-10-24 DIAGNOSIS — N20.0 NEPHROLITHIASIS: Primary | ICD-10-CM

## 2019-10-24 DIAGNOSIS — K21.9 CHRONIC GERD: ICD-10-CM

## 2019-10-24 DIAGNOSIS — N40.1 BENIGN PROSTATIC HYPERPLASIA WITH NOCTURIA: ICD-10-CM

## 2019-10-24 DIAGNOSIS — H90.5 SENSORINEURAL HEARING LOSS (SNHL) OF LEFT EAR, UNSPECIFIED HEARING STATUS ON CONTRALATERAL SIDE: ICD-10-CM

## 2019-10-24 DIAGNOSIS — E78.00 HYPERCHOLESTEROLEMIA: ICD-10-CM

## 2019-10-24 DIAGNOSIS — R35.1 BENIGN PROSTATIC HYPERPLASIA WITH NOCTURIA: ICD-10-CM

## 2019-10-24 PROBLEM — E66.01 SEVERE OBESITY (BMI 35.0-39.9) WITH COMORBIDITY (HCC): Chronic | Status: ACTIVE | Noted: 2019-10-24

## 2019-10-24 PROCEDURE — 99397 PER PM REEVAL EST PAT 65+ YR: CPT | Performed by: FAMILY MEDICINE

## 2019-10-24 PROCEDURE — G0402 INITIAL PREVENTIVE EXAM: HCPCS | Performed by: FAMILY MEDICINE

## 2019-10-24 PROCEDURE — 90670 PCV13 VACCINE IM: CPT | Performed by: FAMILY MEDICINE

## 2019-10-24 PROCEDURE — G0009 ADMIN PNEUMOCOCCAL VACCINE: HCPCS | Performed by: FAMILY MEDICINE

## 2019-10-24 PROCEDURE — 96160 PT-FOCUSED HLTH RISK ASSMT: CPT | Performed by: FAMILY MEDICINE

## 2019-10-24 PROCEDURE — 90686 IIV4 VACC NO PRSV 0.5 ML IM: CPT | Performed by: FAMILY MEDICINE

## 2019-10-24 PROCEDURE — G0008 ADMIN INFLUENZA VIRUS VAC: HCPCS | Performed by: FAMILY MEDICINE

## 2019-10-24 NOTE — PATIENT INSTRUCTIONS
Lisa Zheng's SCREENING SCHEDULE   Tests on this list are recommended by your physician but may not be covered, or covered at this frequency, by your insurer. Please check with your insurance carrier before scheduling to verify coverage.     PREVENTATI history    Colorectal Cancer Screening Covered up to Age 76     Colonoscopy Screen   Covered every 10 years- more often if abnormal Colonoscopy due on 03/20/2021 Update Health Maintenance if applicable    Flex Sigmoidoscopy Screen  Covered every 5 years No previous visit. This may be covered with your prescription benefits, but Medicare does not cover unless Medically needed    Zoster (Not covered by Medicare Part B) No orders found for this or any previous visit.  This may be covered with your pharmacy  pres

## 2019-10-24 NOTE — PROGRESS NOTES
HPI:   Liam Tripathi is a 71year old male who presents for a Medicare Initial Preventative Physical Exam (Welcome to Medicare- < 12 months on Medicare).       His last annual assessment has been over 1 year: Annual Physical due on 10/15/2019         [facility] (125.2 kg)  06/13/19 : 276 lb (125.2 kg)     Last Cholesterol Labs:   Lab Results   Component Value Date    CHOLEST 174 10/15/2018    HDL 43 10/15/2018    LDL 94 10/15/2018    TRIG 185 (H) 10/15/2018          Last Chemistry Labs:   Lab Results   Component Cancer in his brother; Cancer (age of onset: 79) in his father and mother; Other (age of onset: 61) in his sister. SOCIAL HISTORY:   He  reports that he has never smoked.  He has never used smokeless tobacco. He reports current alcohol use of about 2.0 st loss (SNHL) of left ear, unspecified hearing status on contralateral side  Seen by specialist and audiologist. Being fitted for hearing device. History of gout  No exacerbation  Chronic GERD  No exacerbation.    JOHN (obstructive sleep apnea)  Since his de for: FOB No flowsheet data found. Glaucoma Screening      Ophthalmology Visit Annually: Diabetics, FHx Glaucoma, AA>50, > 65 No flowsheet data found.     Prostate Cancer Screening      PSA  Annually PSA due on 11/15/2020  [facility]

## 2020-01-20 ENCOUNTER — APPOINTMENT (OUTPATIENT)
Dept: LAB | Facility: HOSPITAL | Age: 65
End: 2020-01-20
Attending: UROLOGY
Payer: MEDICARE

## 2020-01-20 DIAGNOSIS — C67.0 MALIGNANT NEOPLASM OF TRIGONE OF URINARY BLADDER (HCC): ICD-10-CM

## 2020-01-20 DIAGNOSIS — Z12.5 PROSTATE CANCER SCREENING: ICD-10-CM

## 2020-01-20 LAB
ALBUMIN SERPL-MCNC: 3.7 G/DL (ref 3.4–5)
ALBUMIN/GLOB SERPL: 1.1 {RATIO} (ref 1–2)
ALP LIVER SERPL-CCNC: 65 U/L (ref 45–117)
ALT SERPL-CCNC: 71 U/L (ref 16–61)
ANION GAP SERPL CALC-SCNC: 4 MMOL/L (ref 0–18)
AST SERPL-CCNC: 41 U/L (ref 15–37)
BASOPHILS # BLD AUTO: 0.05 X10(3) UL (ref 0–0.2)
BASOPHILS NFR BLD AUTO: 0.5 %
BILIRUB SERPL-MCNC: 0.6 MG/DL (ref 0.1–2)
BUN BLD-MCNC: 16 MG/DL (ref 7–18)
BUN/CREAT SERPL: 21.1 (ref 10–20)
CALCIUM BLD-MCNC: 8.7 MG/DL (ref 8.5–10.1)
CHLORIDE SERPL-SCNC: 107 MMOL/L (ref 98–112)
CHOLEST SMN-MCNC: 217 MG/DL (ref ?–200)
CO2 SERPL-SCNC: 27 MMOL/L (ref 21–32)
COMPLEXED PSA SERPL-MCNC: 0.9 NG/ML (ref ?–4)
CREAT BLD-MCNC: 0.76 MG/DL (ref 0.7–1.3)
DEPRECATED RDW RBC AUTO: 47.4 FL (ref 35.1–46.3)
EOSINOPHIL # BLD AUTO: 0.05 X10(3) UL (ref 0–0.7)
EOSINOPHIL NFR BLD AUTO: 0.5 %
ERYTHROCYTE [DISTWIDTH] IN BLOOD BY AUTOMATED COUNT: 13.2 % (ref 11–15)
GLOBULIN PLAS-MCNC: 3.4 G/DL (ref 2.8–4.4)
GLUCOSE BLD-MCNC: 97 MG/DL (ref 70–99)
HCT VFR BLD AUTO: 46.9 % (ref 39–53)
HDLC SERPL-MCNC: 69 MG/DL (ref 40–59)
HGB BLD-MCNC: 15.7 G/DL (ref 13–17.5)
IMM GRANULOCYTES # BLD AUTO: 0.04 X10(3) UL (ref 0–1)
IMM GRANULOCYTES NFR BLD: 0.4 %
LDLC SERPL CALC-MCNC: 125 MG/DL (ref ?–100)
LYMPHOCYTES # BLD AUTO: 2.85 X10(3) UL (ref 1–4)
LYMPHOCYTES NFR BLD AUTO: 29.9 %
M PROTEIN MFR SERPL ELPH: 7.1 G/DL (ref 6.4–8.2)
MCH RBC QN AUTO: 32.2 PG (ref 26–34)
MCHC RBC AUTO-ENTMCNC: 33.5 G/DL (ref 31–37)
MCV RBC AUTO: 96.3 FL (ref 80–100)
MONOCYTES # BLD AUTO: 0.72 X10(3) UL (ref 0.1–1)
MONOCYTES NFR BLD AUTO: 7.5 %
NEUTROPHILS # BLD AUTO: 5.83 X10 (3) UL (ref 1.5–7.7)
NEUTROPHILS # BLD AUTO: 5.83 X10(3) UL (ref 1.5–7.7)
NEUTROPHILS NFR BLD AUTO: 61.2 %
NONHDLC SERPL-MCNC: 148 MG/DL (ref ?–130)
OSMOLALITY SERPL CALC.SUM OF ELEC: 287 MOSM/KG (ref 275–295)
PATIENT FASTING Y/N/NP: YES
PATIENT FASTING Y/N/NP: YES
PLATELET # BLD AUTO: 228 10(3)UL (ref 150–450)
POTASSIUM SERPL-SCNC: 4 MMOL/L (ref 3.5–5.1)
RBC # BLD AUTO: 4.87 X10(6)UL (ref 3.8–5.8)
SODIUM SERPL-SCNC: 138 MMOL/L (ref 136–145)
TRIGL SERPL-MCNC: 115 MG/DL (ref 30–149)
URATE SERPL-MCNC: 2.7 MG/DL (ref 3.5–7.2)
VLDLC SERPL CALC-MCNC: 23 MG/DL (ref 0–30)
WBC # BLD AUTO: 9.5 X10(3) UL (ref 4–11)

## 2020-01-20 PROCEDURE — 80061 LIPID PANEL: CPT | Performed by: FAMILY MEDICINE

## 2020-01-20 PROCEDURE — 80053 COMPREHEN METABOLIC PANEL: CPT | Performed by: FAMILY MEDICINE

## 2020-01-20 PROCEDURE — 84550 ASSAY OF BLOOD/URIC ACID: CPT | Performed by: FAMILY MEDICINE

## 2020-01-20 PROCEDURE — 85025 COMPLETE CBC W/AUTO DIFF WBC: CPT | Performed by: FAMILY MEDICINE

## 2020-01-20 PROCEDURE — 88108 CYTOPATH CONCENTRATE TECH: CPT

## 2020-01-20 PROCEDURE — 82306 VITAMIN D 25 HYDROXY: CPT | Performed by: FAMILY MEDICINE

## 2020-01-20 PROCEDURE — 36415 COLL VENOUS BLD VENIPUNCTURE: CPT | Performed by: FAMILY MEDICINE

## 2020-01-22 LAB — 25(OH)D3 SERPL-MCNC: 55.5 NG/ML (ref 30–100)

## 2020-02-12 ENCOUNTER — PATIENT MESSAGE (OUTPATIENT)
Dept: SURGERY | Facility: CLINIC | Age: 65
End: 2020-02-12

## 2020-02-13 ENCOUNTER — TELEPHONE (OUTPATIENT)
Dept: GASTROENTEROLOGY | Facility: CLINIC | Age: 65
End: 2020-02-13

## 2020-02-13 ENCOUNTER — OFFICE VISIT (OUTPATIENT)
Dept: GASTROENTEROLOGY | Facility: CLINIC | Age: 65
End: 2020-02-13
Payer: COMMERCIAL

## 2020-02-13 VITALS
HEIGHT: 75 IN | HEART RATE: 84 BPM | DIASTOLIC BLOOD PRESSURE: 82 MMHG | SYSTOLIC BLOOD PRESSURE: 130 MMHG | WEIGHT: 283.19 LBS | BODY MASS INDEX: 35.21 KG/M2

## 2020-02-13 DIAGNOSIS — R19.04 LEFT LOWER QUADRANT ABDOMINAL MASS: ICD-10-CM

## 2020-02-13 DIAGNOSIS — R10.32 ABDOMINAL PAIN, LLQ: ICD-10-CM

## 2020-02-13 DIAGNOSIS — R10.814 LEFT LOWER QUADRANT ABDOMINAL TENDERNESS WITHOUT REBOUND TENDERNESS: Primary | ICD-10-CM

## 2020-02-13 PROCEDURE — 99214 OFFICE O/P EST MOD 30 MIN: CPT | Performed by: INTERNAL MEDICINE

## 2020-02-13 NOTE — TELEPHONE ENCOUNTER
Pt called stating pt has been treated in the past for bladder cancer. Pt noticed a bulge in the lower abdomen last night after shoveling snow. Pt requesting an appointment as soon as possible.   Please call pt

## 2020-02-13 NOTE — TELEPHONE ENCOUNTER
GI Staff:    CT scan ordered stat. Please help expedite asap. Can you please also reach out to Dr. Cassandra Orantes office to see if he can see this patient soon.     Thank you    Jose Fields MD  [facility], [facility] - Gastroenterology  2/13/2020  2:54 PM

## 2020-02-13 NOTE — PROGRESS NOTES
[facility], [facility] - Gastroenterology                                                                                                  Clinic Progress Note    Patient pr Urethral stricture       Past Surgical History:   Procedure Laterality Date   • BREAST BIOPSY Right 08/01/2014    [facility]   • COLONOSCOPY  07/11/2014    [facility]   • COLONOSCOPY N/A 3/20/2018    Performed by Wilmer Thapa MD at [place] [address] Delayed Release Take 1 capsule (40 mg total) by mouth daily. Take 1 capsule by mouth daily before breakfast. 90 capsule 3   • Oseltamivir Phosphate 75 MG Oral Cap Take 1 capsule (75 mg total) by mouth 2 (two) times daily.  10 capsule 0   • Polyethyl Glycol- him to see Dr. Chikis Marcum whom he has seen in the past for other skin issues for either potential inguinal hernia or possible I&D of this skin lesion. We discussed signs/symptoms that should prompt urgent/ED evaluation.     Recommend:  -CT A/P asap  surgery macrina

## 2020-02-13 NOTE — PATIENT INSTRUCTIONS
1. I've contacted my office staff to help us expedite your CT scan as soon as possible. 2. Please make an appointment with Dr. Malini Menard in surgery as soon as possible ([phone])    3.  If you have worsening pain, vomiting, fever, distention, you should

## 2020-02-13 NOTE — TELEPHONE ENCOUNTER
Dr. Teagan Barfield Staff-  Please assist pt w/ scheduling expedited appt with Dr. Jozef Knox [facility], thank you.     Note for GI:  Through automated line for [facility] [employer] ([phone], option #4, *, 5, 2, 1), the pl

## 2020-02-14 ENCOUNTER — HOSPITAL ENCOUNTER (OUTPATIENT)
Dept: CT IMAGING | Age: 65
Discharge: HOME | End: 2020-02-14
Attending: INTERNAL MEDICINE
Payer: MEDICARE

## 2020-02-14 ENCOUNTER — TELEPHONE (OUTPATIENT)
Dept: GASTROENTEROLOGY | Facility: CLINIC | Age: 65
End: 2020-02-14

## 2020-02-14 DIAGNOSIS — R10.814 LEFT LOWER QUADRANT ABDOMINAL TENDERNESS WITHOUT REBOUND TENDERNESS: ICD-10-CM

## 2020-02-14 DIAGNOSIS — R19.04 LEFT LOWER QUADRANT ABDOMINAL MASS: ICD-10-CM

## 2020-02-14 DIAGNOSIS — R10.32 ABDOMINAL PAIN, LLQ: ICD-10-CM

## 2020-02-14 PROCEDURE — 74177 CT ABD & PELVIS W/CONTRAST: CPT | Performed by: INTERNAL MEDICINE

## 2020-02-14 NOTE — TELEPHONE ENCOUNTER
Called patient and discussed CT scan results which shows what is likely a boil without underlying lesion like an abscess or hernia    Discussed using warm compresses and seeing Dr. Vic Sotelo at schedule appointment to see if needs I&D    All questions answere

## 2020-02-14 NOTE — TELEPHONE ENCOUNTER
Future Appointments   Date Time Provider Jose Angel Urrutia   2/17/2020  8:30 AM Gaby Martin MD [facility]

## 2020-02-17 ENCOUNTER — OFFICE VISIT (OUTPATIENT)
Dept: SURGERY | Facility: CLINIC | Age: 65
End: 2020-02-17
Payer: COMMERCIAL

## 2020-02-17 VITALS — BODY MASS INDEX: 35 KG/M2 | WEIGHT: 276 LBS

## 2020-02-17 DIAGNOSIS — L02.214 ABSCESS OF GROIN, LEFT: Primary | ICD-10-CM

## 2020-02-17 PROCEDURE — G0463 HOSPITAL OUTPT CLINIC VISIT: HCPCS | Performed by: SURGERY

## 2020-02-17 PROCEDURE — 99214 OFFICE O/P EST MOD 30 MIN: CPT | Performed by: SURGERY

## 2020-02-17 NOTE — PROGRESS NOTES
Established Patient Follow-up      2/17/2020    Estephanie Quiñones 71year old      HPI  Patient presents with:  Abscess: Lump on the left lower groin, thought it was a hernia, but CT showed it was a cyst, then it broke 2/16/2020. Drainage was pus and blood. groin, probable skin breakdown from activity, sebaceous cyst less likely. Neosporin and dry gauze protection for another 1-2 weeks, f/u here in persistent mass or non-healing or recurrent infections. Routine derm screening and lipoma removals prn.   Instr

## 2020-02-18 ENCOUNTER — PATIENT MESSAGE (OUTPATIENT)
Dept: SURGERY | Facility: CLINIC | Age: 65
End: 2020-02-18

## 2020-02-19 ENCOUNTER — TELEPHONE (OUTPATIENT)
Dept: SURGERY | Facility: CLINIC | Age: 65
End: 2020-02-19

## 2020-02-19 DIAGNOSIS — C67.0 MALIGNANT NEOPLASM OF TRIGONE OF URINARY BLADDER (HCC): Primary | ICD-10-CM

## 2020-02-19 NOTE — TELEPHONE ENCOUNTER
Dotty Peguero, APN - patient sent a Mymessage on 2/12 c/o a bulge on his abdomen. He has since been evauated by GI and Surgery (dx skin lesion). Pt is now requesting Urology review of his CT scan done 2/13. Please advise.

## 2020-02-19 NOTE — TELEPHONE ENCOUNTER
Gricelda Montano   to Petey Keyes MD      2/18/20 1:07 PM   Please review the scan in my test results relative to kidney.    Thank you.       Maria Alejandra Michael   [phone]

## 2020-02-19 NOTE — TELEPHONE ENCOUNTER
Pt has been evaluated by GI and by Surgery. No further actions needed at this time.  Additionally, see [employer] message from patient 2/18/20

## 2020-02-20 NOTE — TELEPHONE ENCOUNTER
Pt LOV with Dr. Armando Bobby 10/14/19 pt pharmacy requesting refill on alfuzosin if you agree please review and sign med, I copied and pasted part of PVK note below.     2. If you have burning with urination, please buy \"AZO\" over the counter medication for burnin

## 2020-02-20 NOTE — TELEPHONE ENCOUNTER
CT shows stable cysts to his bilateral kidneys. No swelling of the kidneys. Bladder appears normal.  No significant urological abnormality.       Thank you,  Saintclair Dunker APN  [facility]-Urology

## 2020-02-21 RX ORDER — ALFUZOSIN HYDROCHLORIDE 10 MG/1
TABLET, EXTENDED RELEASE ORAL
Qty: 90 TABLET | Refills: 3 | Status: SHIPPED | OUTPATIENT
Start: 2020-02-21 | End: 2020-12-16 | Stop reason: ALTCHOICE

## 2020-02-26 NOTE — TELEPHONE ENCOUNTER
Pt requested info be sent via [employer], but he is not active.  Called pt back to clarify if he wanted to sign up for [employer], or just mail him the information below on how to prepare for upcoming cystoscopy on 04/20/20     From Dr DEL REAL's LOV note on 10/14/19

## 2020-02-26 NOTE — TELEPHONE ENCOUNTER
Spoke with pt and informed test results. Pt verbalized understanding. Pt is having a cystoscopy done on 04/20/20 requesting that any preop instructions be sent via [employer].

## 2020-03-02 RX ORDER — CIPROFLOXACIN 500 MG/1
500 TABLET, FILM COATED ORAL ONCE
Qty: 1 TABLET | Refills: 0 | Status: CANCELLED | OUTPATIENT
Start: 2020-03-02 | End: 2020-03-02

## 2020-03-02 NOTE — TELEPHONE ENCOUNTER
Please advise on the following prior to procedure, pt currently scheduled 4/10/20, see also note below:    1. Patient will need diazepam sent to pharmacy. Pended for signature. Will he also need cipro? 2. Cytology was done in January.   Will he need

## 2020-03-03 RX ORDER — DIAZEPAM 5 MG/1
TABLET ORAL
Qty: 3 TABLET | Refills: 0 | OUTPATIENT
Start: 2020-03-03 | End: 2021-01-11

## 2020-03-03 NOTE — TELEPHONE ENCOUNTER
Urology nurses,    Diazepam order to be called and signed. With respect to Cipro, we give it ourselves in the office before the procedure.   As documented in detailed instructions during last visit, patient to submit another urine for cytology before the u

## 2020-03-04 NOTE — TELEPHONE ENCOUNTER
Phoned pharmacy, however was on hold for appx five minutes, d/t nature of clinic, had to terminate call at this time.

## 2020-03-05 NOTE — TELEPHONE ENCOUNTER
See 's instructions as outlined below. Med phoned in to pt's pharmacy, as ordered, to pharmacist James Chin. Confirmed with verbal read back.   Phoned pt and read to him Dr. Mariam Fernández note as outlined below as well as pt's instructions on Diazepam. Pt verbali

## 2020-03-23 RX ORDER — MELOXICAM 15 MG/1
TABLET ORAL
Qty: 90 TABLET | Refills: 3 | Status: SHIPPED | OUTPATIENT
Start: 2020-03-23 | End: 2020-09-10 | Stop reason: ALTCHOICE

## 2020-03-23 RX ORDER — OMEPRAZOLE 40 MG/1
CAPSULE, DELAYED RELEASE ORAL
Qty: 90 CAPSULE | Refills: 3 | Status: SHIPPED | OUTPATIENT
Start: 2020-03-23 | End: 2021-03-29

## 2020-03-23 RX ORDER — ALLOPURINOL 100 MG/1
100 TABLET ORAL 2 TIMES DAILY
Qty: 180 TABLET | Refills: 3 | OUTPATIENT
Start: 2020-03-23

## 2020-03-23 RX ORDER — ALLOPURINOL 100 MG/1
TABLET ORAL
Qty: 180 TABLET | Refills: 3 | Status: ON HOLD | OUTPATIENT
Start: 2020-03-23 | End: 2020-08-28

## 2020-03-23 NOTE — TELEPHONE ENCOUNTER
Requested Prescriptions     Pending Prescriptions Disp Refills   • OMEPRAZOLE 40 MG Oral Capsule Delayed Release [Pharmacy Med Name: OMEPRAZOLE  40MG  CAP] 90 capsule 3     Sig: TAKE 1 CAPSULE BY MOUTH  DAILY BEFORE BREAKFAST.      LOV 2/13/2020  LR 6/07/19

## 2020-03-23 NOTE — TELEPHONE ENCOUNTER
Requested Prescriptions     Pending Prescriptions Disp Refills   • Omeprazole 40 MG Oral Capsule Delayed Release 90 capsule 3     Sig: Take 1 capsule (40 mg total) by mouth daily.  Take 1 capsule by mouth daily before breakfast.     DUPLICATE

## 2020-03-24 RX ORDER — OMEPRAZOLE 40 MG/1
40 CAPSULE, DELAYED RELEASE ORAL DAILY
Qty: 90 CAPSULE | Refills: 3 | OUTPATIENT
Start: 2020-03-24

## 2020-03-26 RX ORDER — MELOXICAM 15 MG/1
TABLET ORAL
Qty: 90 TABLET | Refills: 0 | Status: SHIPPED | OUTPATIENT
Start: 2020-03-26 | End: 2020-09-10

## 2020-03-26 NOTE — TELEPHONE ENCOUNTER
Refill noted. Chart reviewed. Okay to fill times one for 90 day supply with no additional refills. Refilled on behalf of Dr. Yumi Ribeiro.

## 2020-04-10 ENCOUNTER — PATIENT MESSAGE (OUTPATIENT)
Dept: SURGERY | Facility: CLINIC | Age: 65
End: 2020-04-10

## 2020-04-14 NOTE — TELEPHONE ENCOUNTER
Nurse Guillermo Garvey,  Please consider following patient were waiting for his call and reschedule his cystoscopy for 3 months from now.   I sent him the following message by means of my chart––  \"Sean, if your urine lab tests and kidney ultrasound come back okay

## 2020-04-14 NOTE — TELEPHONE ENCOUNTER
From: Roberto Carlos Hernandez  To: Tony Duckworth MD  Sent: 4/10/2020 3:09 PM CDT  Subject: Other    RE: Scheduled procedure    Just confirming that my appointment for 4/20 is still active. I have the ultra sound scheduled for 4/15 - and urin lap work.     Thank

## 2020-04-14 NOTE — TELEPHONE ENCOUNTER
Dr. Antoine Notice, patient's last office visit = 10/14/2019 for a cysto for hx of bladder cancer of rhe right side of trigone 9/13/2017; no recurrence since.      Do to COVID-19 (Coronavirus) global pandemic and shelter in place order, would you recommend patie

## 2020-04-15 ENCOUNTER — HOSPITAL ENCOUNTER (OUTPATIENT)
Dept: ULTRASOUND IMAGING | Facility: HOSPITAL | Age: 65
Discharge: HOME | End: 2020-04-15
Attending: UROLOGY
Payer: MEDICARE

## 2020-04-15 ENCOUNTER — APPOINTMENT (OUTPATIENT)
Dept: LAB | Facility: HOSPITAL | Age: 65
End: 2020-04-15
Attending: UROLOGY
Payer: MEDICARE

## 2020-04-15 DIAGNOSIS — N20.0 KIDNEY STONE: ICD-10-CM

## 2020-04-15 DIAGNOSIS — C67.0 MALIGNANT NEOPLASM OF TRIGONE OF URINARY BLADDER (HCC): ICD-10-CM

## 2020-04-15 PROCEDURE — 88108 CYTOPATH CONCENTRATE TECH: CPT

## 2020-04-15 PROCEDURE — 76775 US EXAM ABDO BACK WALL LIM: CPT | Performed by: UROLOGY

## 2020-04-15 NOTE — TELEPHONE ENCOUNTER
Phoned patient in response to Dr Tamiko Soriano request to reschedule patient's cystoscopy in July 2020 and relayed his message and recommendations.  Patient verbalized understanding and said, that he already read the message sent via [employer] by MD. He said he

## 2020-05-16 ENCOUNTER — PATIENT MESSAGE (OUTPATIENT)
Dept: SURGERY | Facility: CLINIC | Age: 65
End: 2020-05-16

## 2020-05-21 NOTE — TELEPHONE ENCOUNTER
RN called patient in response to his request to put him on an early schedule. He has a cystoscopy appointment on 6/15 at 1:20pm with Dr Willie Bowling. RN informed patient that there is no opening prior to Nevaeh 15.  Patient thankful of the call and keeping his a

## 2020-05-21 NOTE — TELEPHONE ENCOUNTER
From: Selene Travis  To: Emily Andrade MD  Sent: 5/16/2020 11:25 AM CDT  Subject: Other    RE: Scheduled June 15th procedure    Now that some restrictions have been lifted if you have a slot open before June 15th, my current appointment, I could easil

## 2020-06-15 ENCOUNTER — PROCEDURE (OUTPATIENT)
Dept: SURGERY | Facility: CLINIC | Age: 65
End: 2020-06-15
Payer: COMMERCIAL

## 2020-06-15 VITALS
SYSTOLIC BLOOD PRESSURE: 132 MMHG | BODY MASS INDEX: 35.43 KG/M2 | WEIGHT: 285 LBS | TEMPERATURE: 98 F | RESPIRATION RATE: 16 BRPM | HEIGHT: 75 IN | HEART RATE: 70 BPM | DIASTOLIC BLOOD PRESSURE: 84 MMHG

## 2020-06-15 DIAGNOSIS — C67.9 MALIGNANT NEOPLASM OF URINARY BLADDER, UNSPECIFIED SITE (HCC): Primary | ICD-10-CM

## 2020-06-15 DIAGNOSIS — N28.1 BILATERAL RENAL CYSTS: ICD-10-CM

## 2020-06-15 DIAGNOSIS — Z12.5 PROSTATE CANCER SCREENING: ICD-10-CM

## 2020-06-15 DIAGNOSIS — R82.993 HYPERURICOSURIA: ICD-10-CM

## 2020-06-15 DIAGNOSIS — N20.0 KIDNEY STONE: ICD-10-CM

## 2020-06-15 DIAGNOSIS — R35.1 NOCTURIA: ICD-10-CM

## 2020-06-15 DIAGNOSIS — M54.89 MIDLINE BACK PAIN, UNSPECIFIED BACK LOCATION, UNSPECIFIED CHRONICITY: ICD-10-CM

## 2020-06-15 DIAGNOSIS — N40.1 BENIGN PROSTATIC HYPERPLASIA WITH URINARY FREQUENCY: ICD-10-CM

## 2020-06-15 DIAGNOSIS — R35.0 BENIGN PROSTATIC HYPERPLASIA WITH URINARY FREQUENCY: ICD-10-CM

## 2020-06-15 PROCEDURE — 99213 OFFICE O/P EST LOW 20 MIN: CPT | Performed by: UROLOGY

## 2020-06-15 PROCEDURE — 52000 CYSTOURETHROSCOPY: CPT | Performed by: UROLOGY

## 2020-06-15 PROCEDURE — G0463 HOSPITAL OUTPT CLINIC VISIT: HCPCS | Performed by: UROLOGY

## 2020-06-15 RX ORDER — CIPROFLOXACIN 500 MG/1
500 TABLET, FILM COATED ORAL ONCE
Status: DISCONTINUED | OUTPATIENT
Start: 2020-06-15 | End: 2021-01-11

## 2020-06-15 NOTE — PROGRESS NOTES
PREOPERATIVE DIAGNOSIS:     Bladder cancer of right side of trigone 09/13/2019; no recurrence since then      POSTOP DIAGNOSIS:               The same;  No tumors, stones, or CIS of the bladder or bladder neck    PROCEDURE:              Cystoscopy loss of appetite, or bone pain. Patient smokes a cigar once in a while.  He feels this is stable as he is currently asymptomatic.      History of Large left obstructing UPJ calculus   Started causing pain 9/8/2017; on 9/12/2017 went to [address] [place] [address] ER and had CT helena options, I review with patient the past urological history and I reviewed chart with patient as below . ..     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  Urinary bladder tumor (superficial); excision/removal: Fragments of low-grade papillary urothelial carcinoma (2.0 cm in maximum dimension in aggregat persistent left pelviectasis, improved since prior exam. ; no additional urinary tract calculi identified; 3.8 cm right parapelvic renal cyst; there is increased left periuretic stranding which may be postprocedural; may be related to ascending urinary tra hydronephrosis.   Hepatic steatosis without focal lesion.           In light of the above urological history, and in light of the above urological problems,   I explained the patient the following treatment options :    DIAGNOSES & DISCUSSION    (C67.0) Mal precancerous and no further treatment is needed; he indicates his understanding and agrees.      (N40.1,  R35.0) Benign prostatic hyperplasia with urinary frequency  On KAMLA today, prostate mildly enlarged, no palpable nodules or indurations.  Patient is not makes the color of the urine bright orange red.     3. Continue alfuzosin 10 mg daily     4. Continue allopurinol 100 mg twice daily     5. Continue to avoid high protein diets     6. Continue to limit sodium and salt in your diet     7.   Continue to d Abdirashid Mcgowan MD.   Electronically Signed: Filomena Chinchilla, 6/15/2020, 1:55 PM.

## 2020-06-15 NOTE — PATIENT INSTRUCTIONS
Jose Rutledge M.D.     1. If your urine is bloody, please drink enough liquids to dilute out the blood. If the urine doesn't show any signs of blood, you can drink your usual amount of liquids.  Once there are no

## 2020-06-30 ENCOUNTER — TELEPHONE (OUTPATIENT)
Dept: CASE MANAGEMENT | Age: 65
End: 2020-06-30

## 2020-07-09 ENCOUNTER — TELEPHONE (OUTPATIENT)
Dept: CASE MANAGEMENT | Age: 65
End: 2020-07-09

## 2020-07-30 ENCOUNTER — OFFICE VISIT (OUTPATIENT)
Dept: FAMILY MEDICINE CLINIC | Facility: CLINIC | Age: 65
End: 2020-07-30
Payer: COMMERCIAL

## 2020-07-30 ENCOUNTER — APPOINTMENT (OUTPATIENT)
Dept: LAB | Age: 65
End: 2020-07-30
Attending: FAMILY MEDICINE
Payer: MEDICARE

## 2020-07-30 VITALS
WEIGHT: 290 LBS | BODY MASS INDEX: 36.06 KG/M2 | DIASTOLIC BLOOD PRESSURE: 77 MMHG | HEIGHT: 75 IN | SYSTOLIC BLOOD PRESSURE: 129 MMHG | HEART RATE: 76 BPM | TEMPERATURE: 99 F

## 2020-07-30 DIAGNOSIS — R07.89 CHEST WALL PAIN: ICD-10-CM

## 2020-07-30 DIAGNOSIS — R07.89 CHEST WALL PAIN: Primary | ICD-10-CM

## 2020-07-30 PROCEDURE — 99214 OFFICE O/P EST MOD 30 MIN: CPT | Performed by: FAMILY MEDICINE

## 2020-07-30 PROCEDURE — 3074F SYST BP LT 130 MM HG: CPT | Performed by: FAMILY MEDICINE

## 2020-07-30 PROCEDURE — G0463 HOSPITAL OUTPT CLINIC VISIT: HCPCS | Performed by: FAMILY MEDICINE

## 2020-07-30 PROCEDURE — 93010 ELECTROCARDIOGRAM REPORT: CPT | Performed by: FAMILY MEDICINE

## 2020-07-30 PROCEDURE — 93005 ELECTROCARDIOGRAM TRACING: CPT

## 2020-07-30 PROCEDURE — 3078F DIAST BP <80 MM HG: CPT | Performed by: FAMILY MEDICINE

## 2020-07-30 PROCEDURE — 3008F BODY MASS INDEX DOCD: CPT | Performed by: FAMILY MEDICINE

## 2020-07-30 NOTE — PROGRESS NOTES
HPI:    Patient ID: Chidi Knight is a 76year old male. HPI  Patient presents with:  Chest Pain: LT side chest pain, intermittin pain regularly, with tenderness below breast     Review of Systems   Constitutional: Negative. Respiratory: Negative. No prescriptions requested or ordered in this encounter       Imaging & Referrals:  EKG 12-LEAD       W[facility]#[id number]

## 2020-07-31 ENCOUNTER — PATIENT MESSAGE (OUTPATIENT)
Dept: FAMILY MEDICINE CLINIC | Facility: CLINIC | Age: 65
End: 2020-07-31

## 2020-07-31 NOTE — TELEPHONE ENCOUNTER
From: Iram Pandya  To: Rik Green DO  Sent: 7/31/2020 1:55 PM CDT  Subject: Test Results Question    Has the EKG shown any concerns? Planning on a trip tomorrow and want to make sure I'm safe.   Thanks  Olu Landryr

## 2020-08-13 ENCOUNTER — OFFICE VISIT (OUTPATIENT)
Dept: OTOLARYNGOLOGY | Facility: CLINIC | Age: 65
End: 2020-08-13
Payer: COMMERCIAL

## 2020-08-13 VITALS — WEIGHT: 290 LBS | HEIGHT: 75 IN | BODY MASS INDEX: 36.06 KG/M2

## 2020-08-13 DIAGNOSIS — J34.2 DEVIATED SEPTUM: Primary | ICD-10-CM

## 2020-08-13 PROCEDURE — 3008F BODY MASS INDEX DOCD: CPT | Performed by: OTOLARYNGOLOGY

## 2020-08-13 PROCEDURE — G0463 HOSPITAL OUTPT CLINIC VISIT: HCPCS | Performed by: OTOLARYNGOLOGY

## 2020-08-13 PROCEDURE — 99213 OFFICE O/P EST LOW 20 MIN: CPT | Performed by: OTOLARYNGOLOGY

## 2020-08-13 NOTE — PROGRESS NOTES
Chidi Knight is a 76year old male. Patient presents with: Follow - Up: nasal septal deviation-would lke to discuss treatment options    HPI:   He continues to have difficulty breathing through his nose especially on the right side.   When he lies down h exertion  NEURO: denies headaches    EXAM:   Ht 6' 3\" (1.905 m)   Wt 290 lb (131.5 kg)   BMI 36.25 kg/m²   System Findings Details   Skin Normal Inspection - Normal.   Constitutional Normal Overall appearance - Normal.   Head/Face Normal Facial features -

## 2020-08-21 NOTE — PROGRESS NOTES
Patient is a pleasant 50-year-old male who presents with bilateral knee complaints the left being a little more bothersome than the right.   He describes it as a diffuse ache not specific on the joint line and he has had meniscus tears in the past treated w Pt was scheduled for femoral endarterectomy today but this was cancelled because he ate lunch. He does have monophasic DP, PT, and peroneal pulses in the R foot. Will plan for surgery on Monday. extension full anterior drawer posterior drawer stable the left knee may have a very subtle anterior drawer but is no gross instability there is patellofemoral crepitance of both knees the right knee may be subtly more pronounced in the left knee but there

## 2020-08-26 ENCOUNTER — PATIENT MESSAGE (OUTPATIENT)
Dept: OTOLARYNGOLOGY | Facility: CLINIC | Age: 65
End: 2020-08-26

## 2020-08-28 NOTE — TELEPHONE ENCOUNTER
From: Aylin Fillers  To: Mitchell Osman MD  Sent: 8/26/2020 3:13 PM CDT  Subject: Other    I'm scheduled for surgery on September 2, however, do not have a time yet, please forward  Additionally, it's my understanding that I would be informed of a place

## 2020-08-31 ENCOUNTER — APPOINTMENT (OUTPATIENT)
Dept: LAB | Age: 65
End: 2020-08-31
Attending: OTOLARYNGOLOGY
Payer: MEDICARE

## 2020-08-31 DIAGNOSIS — Z01.818 PREOP TESTING: ICD-10-CM

## 2020-08-31 LAB — SARS-COV-2 RNA RESP QL NAA+PROBE: NOT DETECTED

## 2020-09-02 ENCOUNTER — ANESTHESIA EVENT (OUTPATIENT)
Dept: SURGERY | Facility: HOSPITAL | Age: 65
End: 2020-09-02
Payer: MEDICARE

## 2020-09-02 ENCOUNTER — HOSPITAL ENCOUNTER (OUTPATIENT)
Facility: HOSPITAL | Age: 65
Setting detail: HOSPITAL OUTPATIENT SURGERY
Discharge: HOME | End: 2020-09-02
Attending: OTOLARYNGOLOGY | Admitting: OTOLARYNGOLOGY
Payer: MEDICARE

## 2020-09-02 ENCOUNTER — ANESTHESIA (OUTPATIENT)
Dept: SURGERY | Facility: HOSPITAL | Age: 65
End: 2020-09-02
Payer: MEDICARE

## 2020-09-02 VITALS
SYSTOLIC BLOOD PRESSURE: 120 MMHG | TEMPERATURE: 98 F | WEIGHT: 286 LBS | BODY MASS INDEX: 35.56 KG/M2 | HEIGHT: 75 IN | RESPIRATION RATE: 16 BRPM | HEART RATE: 78 BPM | DIASTOLIC BLOOD PRESSURE: 81 MMHG | OXYGEN SATURATION: 98 %

## 2020-09-02 DIAGNOSIS — Z01.818 PREOP TESTING: Primary | ICD-10-CM

## 2020-09-02 DIAGNOSIS — J34.3 HYPERTROPHY OF NASAL TURBINATES: ICD-10-CM

## 2020-09-02 DIAGNOSIS — J34.2 DEVIATED NASAL SEPTUM: ICD-10-CM

## 2020-09-02 PROCEDURE — 30520 REPAIR OF NASAL SEPTUM: CPT | Performed by: OTOLARYNGOLOGY

## 2020-09-02 PROCEDURE — 30140 RESECT INFERIOR TURBINATE: CPT | Performed by: OTOLARYNGOLOGY

## 2020-09-02 PROCEDURE — 095L0ZZ DESTRUCTION OF NASAL TURBINATE, OPEN APPROACH: ICD-10-PCS | Performed by: OTOLARYNGOLOGY

## 2020-09-02 PROCEDURE — 09SM0ZZ REPOSITION NASAL SEPTUM, OPEN APPROACH: ICD-10-PCS | Performed by: OTOLARYNGOLOGY

## 2020-09-02 PROCEDURE — 09SL0ZZ REPOSITION NASAL TURBINATE, OPEN APPROACH: ICD-10-PCS | Performed by: OTOLARYNGOLOGY

## 2020-09-02 RX ORDER — FAMOTIDINE 20 MG/1
20 TABLET ORAL ONCE
Status: COMPLETED | OUTPATIENT
Start: 2020-09-02 | End: 2020-09-02

## 2020-09-02 RX ORDER — HYDROMORPHONE HYDROCHLORIDE 1 MG/ML
0.2 INJECTION, SOLUTION INTRAMUSCULAR; INTRAVENOUS; SUBCUTANEOUS EVERY 5 MIN PRN
Status: DISCONTINUED | OUTPATIENT
Start: 2020-09-02 | End: 2020-09-02

## 2020-09-02 RX ORDER — MORPHINE SULFATE 10 MG/ML
6 INJECTION, SOLUTION INTRAMUSCULAR; INTRAVENOUS EVERY 10 MIN PRN
Status: DISCONTINUED | OUTPATIENT
Start: 2020-09-02 | End: 2020-09-02

## 2020-09-02 RX ORDER — LIDOCAINE HYDROCHLORIDE AND EPINEPHRINE 10; 10 MG/ML; UG/ML
INJECTION, SOLUTION INFILTRATION; PERINEURAL AS NEEDED
Status: DISCONTINUED | OUTPATIENT
Start: 2020-09-02 | End: 2020-09-02 | Stop reason: HOSPADM

## 2020-09-02 RX ORDER — SODIUM CHLORIDE, SODIUM LACTATE, POTASSIUM CHLORIDE, CALCIUM CHLORIDE 600; 310; 30; 20 MG/100ML; MG/100ML; MG/100ML; MG/100ML
INJECTION, SOLUTION INTRAVENOUS CONTINUOUS
Status: DISCONTINUED | OUTPATIENT
Start: 2020-09-02 | End: 2020-09-02

## 2020-09-02 RX ORDER — NALOXONE HYDROCHLORIDE 0.4 MG/ML
80 INJECTION, SOLUTION INTRAMUSCULAR; INTRAVENOUS; SUBCUTANEOUS AS NEEDED
Status: DISCONTINUED | OUTPATIENT
Start: 2020-09-02 | End: 2020-09-02

## 2020-09-02 RX ORDER — ACETAMINOPHEN 500 MG
1000 TABLET ORAL ONCE
Status: COMPLETED | OUTPATIENT
Start: 2020-09-02 | End: 2020-09-02

## 2020-09-02 RX ORDER — ONDANSETRON 2 MG/ML
4 INJECTION INTRAMUSCULAR; INTRAVENOUS ONCE AS NEEDED
Status: DISCONTINUED | OUTPATIENT
Start: 2020-09-02 | End: 2020-09-02

## 2020-09-02 RX ORDER — MORPHINE SULFATE 4 MG/ML
2 INJECTION, SOLUTION INTRAMUSCULAR; INTRAVENOUS EVERY 10 MIN PRN
Status: DISCONTINUED | OUTPATIENT
Start: 2020-09-02 | End: 2020-09-02

## 2020-09-02 RX ORDER — GLYCOPYRROLATE 0.2 MG/ML
INJECTION, SOLUTION INTRAMUSCULAR; INTRAVENOUS AS NEEDED
Status: DISCONTINUED | OUTPATIENT
Start: 2020-09-02 | End: 2020-09-02 | Stop reason: SURG

## 2020-09-02 RX ORDER — ALLOPURINOL 100 MG/1
100 TABLET ORAL 2 TIMES DAILY
Qty: 180 TABLET | Refills: 3 | Status: SHIPPED | OUTPATIENT
Start: 2020-09-02 | End: 2021-08-31

## 2020-09-02 RX ORDER — ROCURONIUM BROMIDE 10 MG/ML
INJECTION, SOLUTION INTRAVENOUS AS NEEDED
Status: DISCONTINUED | OUTPATIENT
Start: 2020-09-02 | End: 2020-09-02 | Stop reason: SURG

## 2020-09-02 RX ORDER — MORPHINE SULFATE 4 MG/ML
4 INJECTION, SOLUTION INTRAMUSCULAR; INTRAVENOUS EVERY 10 MIN PRN
Status: DISCONTINUED | OUTPATIENT
Start: 2020-09-02 | End: 2020-09-02

## 2020-09-02 RX ORDER — SODIUM CHLORIDE/ALOE VERA
GEL (GRAM) NASAL AS NEEDED
Status: DISCONTINUED | OUTPATIENT
Start: 2020-09-02 | End: 2020-09-02 | Stop reason: HOSPADM

## 2020-09-02 RX ORDER — HYDROCODONE BITARTRATE AND ACETAMINOPHEN 5; 325 MG/1; MG/1
TABLET ORAL
Qty: 20 TABLET | Refills: 0 | Status: SHIPPED | OUTPATIENT
Start: 2020-09-02 | End: 2021-01-26 | Stop reason: ALTCHOICE

## 2020-09-02 RX ORDER — CEPHALEXIN 500 MG/1
500 CAPSULE ORAL EVERY 8 HOURS
Qty: 21 CAPSULE | Refills: 0 | Status: SHIPPED | OUTPATIENT
Start: 2020-09-02 | End: 2021-01-11

## 2020-09-02 RX ORDER — METOCLOPRAMIDE 10 MG/1
10 TABLET ORAL ONCE
Status: COMPLETED | OUTPATIENT
Start: 2020-09-02 | End: 2020-09-02

## 2020-09-02 RX ORDER — PROCHLORPERAZINE EDISYLATE 5 MG/ML
5 INJECTION INTRAMUSCULAR; INTRAVENOUS ONCE AS NEEDED
Status: DISCONTINUED | OUTPATIENT
Start: 2020-09-02 | End: 2020-09-02

## 2020-09-02 RX ORDER — HYDROCODONE BITARTRATE AND ACETAMINOPHEN 5; 325 MG/1; MG/1
1 TABLET ORAL AS NEEDED
Status: DISCONTINUED | OUTPATIENT
Start: 2020-09-02 | End: 2020-09-02

## 2020-09-02 RX ORDER — HYDROMORPHONE HYDROCHLORIDE 1 MG/ML
0.4 INJECTION, SOLUTION INTRAMUSCULAR; INTRAVENOUS; SUBCUTANEOUS EVERY 5 MIN PRN
Status: DISCONTINUED | OUTPATIENT
Start: 2020-09-02 | End: 2020-09-02

## 2020-09-02 RX ORDER — HYDROCODONE BITARTRATE AND ACETAMINOPHEN 5; 325 MG/1; MG/1
2 TABLET ORAL AS NEEDED
Status: DISCONTINUED | OUTPATIENT
Start: 2020-09-02 | End: 2020-09-02

## 2020-09-02 RX ORDER — HYDROCODONE BITARTRATE AND ACETAMINOPHEN 5; 325 MG/1; MG/1
1 TABLET ORAL EVERY 4 HOURS PRN
Status: DISCONTINUED | OUTPATIENT
Start: 2020-09-02 | End: 2020-09-02

## 2020-09-02 RX ORDER — LIDOCAINE HYDROCHLORIDE 10 MG/ML
INJECTION, SOLUTION EPIDURAL; INFILTRATION; INTRACAUDAL; PERINEURAL AS NEEDED
Status: DISCONTINUED | OUTPATIENT
Start: 2020-09-02 | End: 2020-09-02 | Stop reason: SURG

## 2020-09-02 RX ORDER — MIDAZOLAM HYDROCHLORIDE 1 MG/ML
INJECTION INTRAMUSCULAR; INTRAVENOUS AS NEEDED
Status: DISCONTINUED | OUTPATIENT
Start: 2020-09-02 | End: 2020-09-02 | Stop reason: SURG

## 2020-09-02 RX ORDER — HALOPERIDOL 5 MG/ML
0.25 INJECTION INTRAMUSCULAR ONCE AS NEEDED
Status: DISCONTINUED | OUTPATIENT
Start: 2020-09-02 | End: 2020-09-02

## 2020-09-02 RX ORDER — HYDROMORPHONE HYDROCHLORIDE 1 MG/ML
0.6 INJECTION, SOLUTION INTRAMUSCULAR; INTRAVENOUS; SUBCUTANEOUS EVERY 5 MIN PRN
Status: DISCONTINUED | OUTPATIENT
Start: 2020-09-02 | End: 2020-09-02

## 2020-09-02 RX ORDER — EPHEDRINE SULFATE 50 MG/ML
INJECTION, SOLUTION INTRAVENOUS AS NEEDED
Status: DISCONTINUED | OUTPATIENT
Start: 2020-09-02 | End: 2020-09-02 | Stop reason: SURG

## 2020-09-02 RX ORDER — SODIUM CHLORIDE 0.9 % (FLUSH) 0.9 %
10 SYRINGE (ML) INJECTION AS NEEDED
Status: DISCONTINUED | OUTPATIENT
Start: 2020-09-02 | End: 2020-09-02

## 2020-09-02 RX ORDER — DEXAMETHASONE SODIUM PHOSPHATE 4 MG/ML
VIAL (ML) INJECTION AS NEEDED
Status: DISCONTINUED | OUTPATIENT
Start: 2020-09-02 | End: 2020-09-02 | Stop reason: SURG

## 2020-09-02 RX ORDER — ONDANSETRON 2 MG/ML
INJECTION INTRAMUSCULAR; INTRAVENOUS AS NEEDED
Status: DISCONTINUED | OUTPATIENT
Start: 2020-09-02 | End: 2020-09-02 | Stop reason: SURG

## 2020-09-02 RX ORDER — DIAPER,BRIEF,INFANT-TODD,DISP
EACH MISCELLANEOUS AS NEEDED
Status: DISCONTINUED | OUTPATIENT
Start: 2020-09-02 | End: 2020-09-02 | Stop reason: HOSPADM

## 2020-09-02 RX ADMIN — DEXAMETHASONE SODIUM PHOSPHATE 4 MG: 4 MG/ML VIAL (ML) INJECTION at 11:16:00

## 2020-09-02 RX ADMIN — MIDAZOLAM HYDROCHLORIDE 2 MG: 1 INJECTION INTRAMUSCULAR; INTRAVENOUS at 11:15:00

## 2020-09-02 RX ADMIN — LIDOCAINE HYDROCHLORIDE 50 MG: 10 INJECTION, SOLUTION EPIDURAL; INFILTRATION; INTRACAUDAL; PERINEURAL at 11:16:00

## 2020-09-02 RX ADMIN — GLYCOPYRROLATE 0.2 MG: 0.2 INJECTION, SOLUTION INTRAMUSCULAR; INTRAVENOUS at 11:16:00

## 2020-09-02 RX ADMIN — EPHEDRINE SULFATE 10 MG: 50 INJECTION, SOLUTION INTRAVENOUS at 11:32:00

## 2020-09-02 RX ADMIN — SODIUM CHLORIDE, SODIUM LACTATE, POTASSIUM CHLORIDE, CALCIUM CHLORIDE: 600; 310; 30; 20 INJECTION, SOLUTION INTRAVENOUS at 11:59:00

## 2020-09-02 RX ADMIN — ROCURONIUM BROMIDE 30 MG: 10 INJECTION, SOLUTION INTRAVENOUS at 11:37:00

## 2020-09-02 RX ADMIN — ROCURONIUM BROMIDE 10 MG: 10 INJECTION, SOLUTION INTRAVENOUS at 11:16:00

## 2020-09-02 RX ADMIN — ONDANSETRON 4 MG: 2 INJECTION INTRAMUSCULAR; INTRAVENOUS at 11:16:00

## 2020-09-02 NOTE — BRIEF OP NOTE
Pre-Operative Diagnosis: Deviated nasal septum [J34.2]  Hypertrophy of nasal turbinates [J34.3]     Post-Operative Diagnosis: Deviated nasal septum [A49. 2]Hypertrophy of nasal turbinates [J34.3]      Procedure Performed:   Procedure(s):  septoplasty, bilat

## 2020-09-02 NOTE — ANESTHESIA PROCEDURE NOTES
Airway  Date/Time: 9/2/2020 11:32 AM  Urgency: Elective    Airway not difficult    General Information and Staff    Patient location during procedure: OR  Anesthesiologist: Damari Ratliff MD  Resident/CRNA: LUI Smith and Blaze Piña

## 2020-09-02 NOTE — ANESTHESIA POSTPROCEDURE EVALUATION
Patient: Aylin Fillers    Procedure Summary     Date:  09/02/20 Room / Location:  [facility] OR 03 / [facility] OR    Anesthesia Start:  1114 Anesthesia Stop:      Procedure:  NASAL SEPTOPLASTY TURBINECTOMY (Bilateral Nose) Diagnosis:       Deviated nasal sep

## 2020-09-02 NOTE — H&P
He continues to have difficulty breathing through his nose especially on the right side. When he lies down he cannot breathe through either side because it becomes plugged.   He is tried nasal steroids and antihistamines without any improvement  Current Ou Details   Skin Normal Inspection - Normal.   Constitutional Normal Overall appearance - Normal.   Head/Face Normal Facial features - Normal. Eyebrows - Normal. Skull - Normal.   Oral/Oropharynx Normal Lips - Normal, Tonsils - Normal, Tongue - Normal    Manuel

## 2020-09-02 NOTE — OPERATIVE REPORT
[facility]    PATIENT'S NAME: JESSIE BARBOSA   ATTENDING PHYSICIAN: Cristofer Umaña MD   OPERATING PHYSICIAN: Cristofer Umaña MD   PATIENT ACCOUNT#:   [id number]    LOCATION:  [address]  MEDICAL RECORD #:   [id number]       DATE OF BI

## 2020-09-02 NOTE — ANESTHESIA PREPROCEDURE EVALUATION
Anesthesia PreOp Note    HPI:     Marquis Daniels is a 76year old male who presents for preoperative consultation requested by: Lorenzo Syed MD    Date of Surgery: 9/2/2020    Procedure(s):  NASAL SEPTOPLASTY TURBINECTOMY  Indication: Deviated nasal sep ARTHROSCOPY Left 2007   • KNEE SURGERY Right    • LIPOMA REMOVAL Bilateral 08/01/2014    [facility]   • LIPOMA REMOVAL Left 2019    forearm   • LITHOTRIPSY  09/2017   • TONSILLECTOMY  1964       Ciprofloxacin HCl (CIPRO) tab 500 mg, 500 mg, Oral Financial resource strain: Not on file      Food insecurity:        Worry: Not on file        Inability: Not on file      Transportation needs:        Medical: Not on file        Non-medical: Not on file    Tobacco Use      Smoking status: Never Smoker General  Airway:  ETT  Post-op Pain Management: IV analgesics      I have informed Jony Rummage and/or legal guardian or family member of the nature of the anesthetic plan, benefits, risks including possible dental damage if relevant, major complications

## 2020-09-02 NOTE — INTERVAL H&P NOTE
Pre-op Diagnosis: Deviated nasal septum [J34.2]  Hypertrophy of nasal turbinates [J34.3]    The above referenced H&P was reviewed by Eder Huggins MD on 9/2/2020, the patient was examined and no significant changes have occurred in the patient's conditio

## 2020-09-03 ENCOUNTER — TELEPHONE (OUTPATIENT)
Dept: OTOLARYNGOLOGY | Facility: CLINIC | Age: 65
End: 2020-09-03

## 2020-09-03 NOTE — TELEPHONE ENCOUNTER
Pt is post op day 1 eseptoplasty, SMR. Per  Pt pt is doing well no bleeding, advised pt no bending or heavy lifting for the next week and pt is not to blow nose until seen by .  Advised pt she can start using OTC saline nasal spray daily, afrin up to 5 d

## 2020-09-08 ENCOUNTER — OFFICE VISIT (OUTPATIENT)
Dept: OTOLARYNGOLOGY | Facility: CLINIC | Age: 65
End: 2020-09-08
Payer: COMMERCIAL

## 2020-09-08 VITALS
SYSTOLIC BLOOD PRESSURE: 124 MMHG | DIASTOLIC BLOOD PRESSURE: 81 MMHG | HEIGHT: 75 IN | HEART RATE: 77 BPM | WEIGHT: 286 LBS | BODY MASS INDEX: 35.56 KG/M2 | TEMPERATURE: 98 F

## 2020-09-08 DIAGNOSIS — J34.2 DEVIATED SEPTUM: Primary | ICD-10-CM

## 2020-09-08 PROCEDURE — G0463 HOSPITAL OUTPT CLINIC VISIT: HCPCS | Performed by: OTOLARYNGOLOGY

## 2020-09-08 PROCEDURE — 3074F SYST BP LT 130 MM HG: CPT | Performed by: OTOLARYNGOLOGY

## 2020-09-08 PROCEDURE — 99024 POSTOP FOLLOW-UP VISIT: CPT | Performed by: OTOLARYNGOLOGY

## 2020-09-08 PROCEDURE — 3008F BODY MASS INDEX DOCD: CPT | Performed by: OTOLARYNGOLOGY

## 2020-09-08 PROCEDURE — 3079F DIAST BP 80-89 MM HG: CPT | Performed by: OTOLARYNGOLOGY

## 2020-09-08 NOTE — PROGRESS NOTES
He has been doing really well following his septoplasty. Exam:  Debris cleared from the nasal passages. Nasal splints removed. Airway patent bilaterally      Assessment/plan:  Doing really well following his septoplasty and turbinate reduction.   Rubén

## 2020-09-10 ENCOUNTER — OFFICE VISIT (OUTPATIENT)
Dept: FAMILY MEDICINE CLINIC | Facility: CLINIC | Age: 65
End: 2020-09-10
Payer: COMMERCIAL

## 2020-09-10 ENCOUNTER — PATIENT MESSAGE (OUTPATIENT)
Dept: FAMILY MEDICINE CLINIC | Facility: CLINIC | Age: 65
End: 2020-09-10

## 2020-09-10 VITALS
SYSTOLIC BLOOD PRESSURE: 128 MMHG | HEART RATE: 73 BPM | HEIGHT: 75 IN | BODY MASS INDEX: 36.06 KG/M2 | TEMPERATURE: 97 F | WEIGHT: 290 LBS | DIASTOLIC BLOOD PRESSURE: 81 MMHG

## 2020-09-10 DIAGNOSIS — E66.01 SEVERE OBESITY (BMI 35.0-39.9) WITH COMORBIDITY (HCC): ICD-10-CM

## 2020-09-10 DIAGNOSIS — J34.2 NASAL SEPTAL DEVIATION: ICD-10-CM

## 2020-09-10 DIAGNOSIS — N13.30 HYDRONEPHROSIS, LEFT: ICD-10-CM

## 2020-09-10 DIAGNOSIS — D49.4 BLADDER TUMOR: ICD-10-CM

## 2020-09-10 DIAGNOSIS — N20.0 NEPHROLITHIASIS: ICD-10-CM

## 2020-09-10 DIAGNOSIS — M15.9 PRIMARY OSTEOARTHRITIS INVOLVING MULTIPLE JOINTS: ICD-10-CM

## 2020-09-10 DIAGNOSIS — R31.29 MICROHEMATURIA: ICD-10-CM

## 2020-09-10 DIAGNOSIS — E78.00 HYPERCHOLESTEROLEMIA: ICD-10-CM

## 2020-09-10 DIAGNOSIS — R35.1 BENIGN PROSTATIC HYPERPLASIA WITH NOCTURIA: Primary | ICD-10-CM

## 2020-09-10 DIAGNOSIS — H90.42 SENSORINEURAL HEARING LOSS (SNHL) OF LEFT EAR WITH UNRESTRICTED HEARING OF RIGHT EAR: ICD-10-CM

## 2020-09-10 DIAGNOSIS — Z00.00 ENCOUNTER FOR ANNUAL HEALTH EXAMINATION: ICD-10-CM

## 2020-09-10 DIAGNOSIS — N40.1 BENIGN PROSTATIC HYPERPLASIA WITH NOCTURIA: Primary | ICD-10-CM

## 2020-09-10 PROBLEM — M19.90 OSTEOARTHRITIS: Status: ACTIVE | Noted: 2020-09-10

## 2020-09-10 PROCEDURE — 96160 PT-FOCUSED HLTH RISK ASSMT: CPT | Performed by: FAMILY MEDICINE

## 2020-09-10 PROCEDURE — 90732 PPSV23 VACC 2 YRS+ SUBQ/IM: CPT | Performed by: FAMILY MEDICINE

## 2020-09-10 PROCEDURE — 3079F DIAST BP 80-89 MM HG: CPT | Performed by: FAMILY MEDICINE

## 2020-09-10 PROCEDURE — G0009 ADMIN PNEUMOCOCCAL VACCINE: HCPCS | Performed by: FAMILY MEDICINE

## 2020-09-10 PROCEDURE — 3074F SYST BP LT 130 MM HG: CPT | Performed by: FAMILY MEDICINE

## 2020-09-10 PROCEDURE — 3008F BODY MASS INDEX DOCD: CPT | Performed by: FAMILY MEDICINE

## 2020-09-10 PROCEDURE — 99397 PER PM REEVAL EST PAT 65+ YR: CPT | Performed by: FAMILY MEDICINE

## 2020-09-10 PROCEDURE — G0438 PPPS, INITIAL VISIT: HCPCS | Performed by: FAMILY MEDICINE

## 2020-09-10 RX ORDER — MELOXICAM 15 MG/1
TABLET ORAL
Qty: 90 TABLET | Refills: 3 | Status: SHIPPED | OUTPATIENT
Start: 2020-09-10 | End: 2021-04-01

## 2020-09-10 NOTE — TELEPHONE ENCOUNTER
Spoke with Emelia Brown from [employer],  verified. He was informed to cancelled meloxicam refilled from today, he stated understanding.

## 2020-09-10 NOTE — TELEPHONE ENCOUNTER
Routed to Dr Mykel Mao for advise, thanks.       Future Appointments   Date Time Provider Jose Angel Urrutia   10/6/2020  9:00 AM [address] [place] [address] [address]   12/16/2020  2:00 PM Dana Rouse MD [facility]

## 2020-09-10 NOTE — TELEPHONE ENCOUNTER
From: Navya Campos  To: Eda Joy DO  Sent: 9/10/2020 2:42 PM CDT  Subject: Visit Follow-up Question    Jim Mendoza here. Two items:  1.  Today Sylwia Sun went to eliminate the duplication of the Meloxicam on My Chart and it 'ordered a refill' fro

## 2020-09-11 NOTE — PATIENT INSTRUCTIONS
Mee Zheng's SCREENING SCHEDULE   Tests on this list are recommended by your physician but may not be covered, or covered at this frequency, by your insurer. Please check with your insurance carrier before scheduling to verify coverage.     PREVENTATI often if abnormal Colonoscopy due on 03/20/2021 Update [facility] if applicable    Flex Sigmoidoscopy Screen  Covered every 5 years No results found for this or any previous visit. No flowsheet data found.      Fecal Occult Blood   Covered Annually your prescription benefits, but Medicare does not cover unless Medically needed    Zoster (Not covered by Medicare Part B) No orders found for this or any previous visit.  This may be covered with your pharmacy  prescription benefits     Recommended Website

## 2020-09-11 NOTE — PROGRESS NOTES
HPI:   Caitlyn Ortiz is a 76year old male who presents for a Medicare Initial Annual Wellness visit (Once after 12 month Medicare anniversary) .       Annual Physical due on 09/10/2021        Fall/Risk Assessment   He has been screened for Falls and is unrestricted hearing of right ear    Wt Readings from Last 3 Encounters:  09/10/20 : 290 lb (131.5 kg)  09/08/20 : 286 lb (129.7 kg)  09/02/20 : 286 lb (129.7 kg)     Last Cholesterol Labs:   Lab Results   Component Value Date    CHOLEST 217 (H) 01/20/2020 Inguinal hernia repair (Right, 1999);  Breast biopsy (Right, 08/01/2014); lipoma removal (Bilateral, 08/01/2014); tonsillectomy (1964); knee surgery (Right); cystouretero w/excise tumor (Left, 09/2017); lithotripsy (09/2017); colonoscopy (03/20/2018); egd ( mass index is 36.25 kg/m² as calculated from the following:    Height as of this encounter: 6' 3\" (1.905 m). Weight as of this encounter: 290 lb (131.5 kg).     Medicare Hearing Assessment  (Required for AWV/SWV)               Visual Acuity  Right Eye V presents for a Medicare Assessment. PLAN SUMMARY:   Diagnoses and all orders for this visit:    Benign prostatic hyperplasia with nocturia  Seeing urologist.mild symptoms. Severe obesity (BMI 35.0-39. 9) with comorbidity ([address]  He is attempting weight HbgA1C   Annually No results found for: A1C    No flowsheet data found.     Fasting Blood Sugar (FSB)Annually Glucose (mg/dL)   Date Value   01/20/2020 97          Cardiovascular Disease Screening     LDL Annually LDL Cholesterol (mg/dL)   Date Value   01/2 be covered with your pharmacy  prescription benefits

## 2020-10-06 ENCOUNTER — HOSPITAL ENCOUNTER (OUTPATIENT)
Dept: ULTRASOUND IMAGING | Facility: HOSPITAL | Age: 65
Discharge: HOME | End: 2020-10-06
Attending: UROLOGY
Payer: MEDICARE

## 2020-10-06 DIAGNOSIS — R35.0 BENIGN PROSTATIC HYPERPLASIA WITH URINARY FREQUENCY: ICD-10-CM

## 2020-10-06 DIAGNOSIS — N40.1 BENIGN PROSTATIC HYPERPLASIA WITH URINARY FREQUENCY: ICD-10-CM

## 2020-10-06 DIAGNOSIS — R35.1 NOCTURIA: ICD-10-CM

## 2020-10-06 PROCEDURE — 76872 US TRANSRECTAL: CPT | Performed by: UROLOGY

## 2020-10-14 ENCOUNTER — IMMUNIZATION (OUTPATIENT)
Dept: FAMILY MEDICINE CLINIC | Facility: CLINIC | Age: 65
End: 2020-10-14
Payer: COMMERCIAL

## 2020-10-14 ENCOUNTER — MED REC SCAN ONLY (OUTPATIENT)
Dept: FAMILY MEDICINE CLINIC | Facility: CLINIC | Age: 65
End: 2020-10-14

## 2020-10-14 DIAGNOSIS — Z23 NEED FOR VACCINATION: ICD-10-CM

## 2020-10-14 PROCEDURE — 90662 IIV NO PRSV INCREASED AG IM: CPT | Performed by: FAMILY MEDICINE

## 2020-10-14 PROCEDURE — G0008 ADMIN INFLUENZA VIRUS VAC: HCPCS | Performed by: FAMILY MEDICINE

## 2020-10-16 ENCOUNTER — OFFICE VISIT (OUTPATIENT)
Dept: OTOLARYNGOLOGY | Facility: CLINIC | Age: 65
End: 2020-10-16
Payer: COMMERCIAL

## 2020-10-16 VITALS
SYSTOLIC BLOOD PRESSURE: 132 MMHG | BODY MASS INDEX: 36.06 KG/M2 | DIASTOLIC BLOOD PRESSURE: 84 MMHG | HEIGHT: 75 IN | HEART RATE: 71 BPM | WEIGHT: 290 LBS

## 2020-10-16 DIAGNOSIS — R09.81 NASAL CONGESTION: Primary | ICD-10-CM

## 2020-10-16 PROCEDURE — 3079F DIAST BP 80-89 MM HG: CPT | Performed by: OTOLARYNGOLOGY

## 2020-10-16 PROCEDURE — 3075F SYST BP GE 130 - 139MM HG: CPT | Performed by: OTOLARYNGOLOGY

## 2020-10-16 PROCEDURE — G0463 HOSPITAL OUTPT CLINIC VISIT: HCPCS | Performed by: OTOLARYNGOLOGY

## 2020-10-16 PROCEDURE — 3008F BODY MASS INDEX DOCD: CPT | Performed by: OTOLARYNGOLOGY

## 2020-10-16 PROCEDURE — 99024 POSTOP FOLLOW-UP VISIT: CPT | Performed by: OTOLARYNGOLOGY

## 2020-10-16 RX ORDER — FLUTICASONE PROPIONATE 50 MCG
2 SPRAY, SUSPENSION (ML) NASAL DAILY
Qty: 1 BOTTLE | Refills: 3 | Status: SHIPPED | OUTPATIENT
Start: 2020-10-16 | End: 2020-12-02

## 2020-10-16 RX ORDER — MONTELUKAST SODIUM 10 MG/1
10 TABLET ORAL NIGHTLY
Qty: 30 TABLET | Refills: 3 | Status: SHIPPED | OUTPATIENT
Start: 2020-10-16 | End: 2020-12-02

## 2020-10-17 ENCOUNTER — TELEPHONE (OUTPATIENT)
Dept: OTOLARYNGOLOGY | Facility: CLINIC | Age: 65
End: 2020-10-17

## 2020-10-17 NOTE — TELEPHONE ENCOUNTER
Please let her know that I was able to review the MRI that he had done last year. It does not show any problems in the left side of his face.   I would recommend that we continue to use the medications that we discussed and see if this helps either his sirena

## 2020-10-17 NOTE — PROGRESS NOTES
Fabby Blake is a 76year old male. Patient presents with:  Sinus Problem: per pt after surgery, feels he cannot breath through his nose,,    HPI:   He still feels like he is having a lot of difficulty breathing through his nose.   He does not feel he is Smokeless tobacco: Never Used      Tobacco comment: occasional cigar    Alcohol use:  Yes      Alcohol/week: 2.0 standard drinks      Types: 2 Glasses of wine per week      Comment: occasionally    Drug use: No       REVIEW OF SYSTEMS:   GENERAL HEALTH: fee recommended the regular use of nasal steroids, antihistamines, Singulair. Recommend reevaluation. He did have an MRI performed last year which I reviewed. He is having some pressure in the left side of his face.   The MRI does not show any significant ab

## 2020-10-21 ENCOUNTER — LAB ENCOUNTER (OUTPATIENT)
Dept: LAB | Facility: REFERENCE LAB | Age: 65
End: 2020-10-21
Attending: FAMILY MEDICINE
Payer: MEDICARE

## 2020-10-21 DIAGNOSIS — E78.00 HYPERCHOLESTEROLEMIA: ICD-10-CM

## 2020-10-21 PROCEDURE — 36415 COLL VENOUS BLD VENIPUNCTURE: CPT

## 2020-10-21 PROCEDURE — 80053 COMPREHEN METABOLIC PANEL: CPT

## 2020-10-21 PROCEDURE — 85025 COMPLETE CBC W/AUTO DIFF WBC: CPT

## 2020-10-21 PROCEDURE — 80061 LIPID PANEL: CPT

## 2020-10-27 ENCOUNTER — OFFICE VISIT (OUTPATIENT)
Dept: OTOLARYNGOLOGY | Facility: CLINIC | Age: 65
End: 2020-10-27
Payer: COMMERCIAL

## 2020-10-27 VITALS
TEMPERATURE: 98 F | SYSTOLIC BLOOD PRESSURE: 155 MMHG | BODY MASS INDEX: 36.06 KG/M2 | WEIGHT: 290 LBS | HEIGHT: 75 IN | DIASTOLIC BLOOD PRESSURE: 91 MMHG

## 2020-10-27 DIAGNOSIS — R09.81 NASAL CONGESTION: Primary | ICD-10-CM

## 2020-10-27 DIAGNOSIS — R07.0 THROAT PAIN: ICD-10-CM

## 2020-10-27 PROCEDURE — 3080F DIAST BP >= 90 MM HG: CPT | Performed by: OTOLARYNGOLOGY

## 2020-10-27 PROCEDURE — G0463 HOSPITAL OUTPT CLINIC VISIT: HCPCS | Performed by: OTOLARYNGOLOGY

## 2020-10-27 PROCEDURE — 3077F SYST BP >= 140 MM HG: CPT | Performed by: OTOLARYNGOLOGY

## 2020-10-27 PROCEDURE — 99213 OFFICE O/P EST LOW 20 MIN: CPT | Performed by: OTOLARYNGOLOGY

## 2020-10-27 PROCEDURE — 3008F BODY MASS INDEX DOCD: CPT | Performed by: OTOLARYNGOLOGY

## 2020-10-28 NOTE — PROGRESS NOTES
Lulu Haq is a 76year old male. Patient presents with:   Follow - Up: s/p septoplasty done on 9/2/20- c/o sore throat    HPI:   His nasal congestion has been much better but he still feels that there is irritation in his throat which is gone on since tobacco: Never Used      Tobacco comment: occasional cigar    Alcohol use:  Yes      Alcohol/week: 2.0 standard drinks      Types: 2 Glasses of wine per week      Comment: occasionally    Drug use: No       REVIEW OF SYSTEMS:   GENERAL HEALTH: feels well ot lesions. I did discuss with him the possibility of acid reflux contributing to his symptoms and even just some residual irritation from the intubation and surgery but I find it less likely as its been almost 2 months since his surgery.   I have recommended

## 2020-12-02 ENCOUNTER — TELEPHONE (OUTPATIENT)
Dept: OTOLARYNGOLOGY | Facility: CLINIC | Age: 65
End: 2020-12-02

## 2020-12-02 RX ORDER — FLUTICASONE PROPIONATE 50 MCG
2 SPRAY, SUSPENSION (ML) NASAL DAILY
Qty: 3 BOTTLE | Refills: 1 | Status: SHIPPED | OUTPATIENT
Start: 2020-12-02 | End: 2021-05-24

## 2020-12-02 RX ORDER — MONTELUKAST SODIUM 10 MG/1
10 TABLET ORAL NIGHTLY
Qty: 90 TABLET | Refills: 1 | Status: SHIPPED | OUTPATIENT
Start: 2020-12-02 | End: 2021-04-13

## 2020-12-02 NOTE — TELEPHONE ENCOUNTER
•  Fluticasone Propionate 50 MCG/ACT Nasal Suspension, 2 sprays by Nasal route daily. , Disp: 1 Bottle, Rfl: 3  •  Montelukast Sodium 10 MG Oral Tab, Take 1 tablet (10 mg total) by mouth nightly., Disp: 30 tablet, Rfl: 3

## 2020-12-09 ENCOUNTER — LAB ENCOUNTER (OUTPATIENT)
Dept: LAB | Facility: HOSPITAL | Age: 65
End: 2020-12-09
Attending: UROLOGY
Payer: MEDICARE

## 2020-12-09 DIAGNOSIS — C67.9 MALIGNANT NEOPLASM OF URINARY BLADDER, UNSPECIFIED SITE (HCC): ICD-10-CM

## 2020-12-09 PROCEDURE — 88108 CYTOPATH CONCENTRATE TECH: CPT

## 2020-12-16 ENCOUNTER — PROCEDURE (OUTPATIENT)
Dept: SURGERY | Facility: CLINIC | Age: 65
End: 2020-12-16
Payer: COMMERCIAL

## 2020-12-16 VITALS — DIASTOLIC BLOOD PRESSURE: 90 MMHG | SYSTOLIC BLOOD PRESSURE: 140 MMHG | HEART RATE: 80 BPM

## 2020-12-16 DIAGNOSIS — R82.89 ABNORMAL URINE CYTOLOGY: ICD-10-CM

## 2020-12-16 DIAGNOSIS — R39.12 WEAK URINARY STREAM: ICD-10-CM

## 2020-12-16 DIAGNOSIS — N28.1 BILATERAL RENAL CYSTS: ICD-10-CM

## 2020-12-16 DIAGNOSIS — N20.1 LEFT URETERAL STONE: ICD-10-CM

## 2020-12-16 DIAGNOSIS — Z12.5 PROSTATE CANCER SCREENING: ICD-10-CM

## 2020-12-16 DIAGNOSIS — C67.0 MALIGNANT NEOPLASM OF TRIGONE OF URINARY BLADDER (HCC): Primary | ICD-10-CM

## 2020-12-16 DIAGNOSIS — F17.200 CURRENT SMOKER: ICD-10-CM

## 2020-12-16 DIAGNOSIS — R35.1 NOCTURIA: ICD-10-CM

## 2020-12-16 DIAGNOSIS — N20.0 KIDNEY STONE: ICD-10-CM

## 2020-12-16 DIAGNOSIS — R35.0 BENIGN PROSTATIC HYPERPLASIA WITH URINARY FREQUENCY: ICD-10-CM

## 2020-12-16 DIAGNOSIS — M54.89 MIDLINE BACK PAIN, UNSPECIFIED BACK LOCATION, UNSPECIFIED CHRONICITY: ICD-10-CM

## 2020-12-16 DIAGNOSIS — Z87.448 HISTORY OF URETHRAL STRICTURE: ICD-10-CM

## 2020-12-16 DIAGNOSIS — R82.993 HYPERURICOSURIA: ICD-10-CM

## 2020-12-16 DIAGNOSIS — N40.1 BENIGN PROSTATIC HYPERPLASIA WITH URINARY FREQUENCY: ICD-10-CM

## 2020-12-16 PROCEDURE — 3080F DIAST BP >= 90 MM HG: CPT | Performed by: UROLOGY

## 2020-12-16 PROCEDURE — G0463 HOSPITAL OUTPT CLINIC VISIT: HCPCS | Performed by: UROLOGY

## 2020-12-16 PROCEDURE — 3077F SYST BP >= 140 MM HG: CPT | Performed by: UROLOGY

## 2020-12-16 PROCEDURE — 52000 CYSTOURETHROSCOPY: CPT | Performed by: UROLOGY

## 2020-12-16 PROCEDURE — 99214 OFFICE O/P EST MOD 30 MIN: CPT | Performed by: UROLOGY

## 2020-12-16 RX ORDER — DIAZEPAM 5 MG/1
TABLET ORAL
Qty: 3 TABLET | Refills: 0 | Status: SHIPPED | OUTPATIENT
Start: 2020-12-16 | End: 2021-01-11

## 2020-12-16 RX ORDER — TAMSULOSIN HYDROCHLORIDE 0.4 MG/1
0.8 CAPSULE ORAL DAILY
Qty: 180 CAPSULE | Refills: 3 | Status: SHIPPED | OUTPATIENT
Start: 2020-12-16 | End: 2021-07-19

## 2020-12-16 RX ORDER — CIPROFLOXACIN 500 MG/1
500 TABLET, FILM COATED ORAL ONCE
Status: COMPLETED | OUTPATIENT
Start: 2020-12-16 | End: 2020-12-16

## 2020-12-16 RX ADMIN — CIPROFLOXACIN 500 MG: 500 TABLET, FILM COATED ORAL at 15:16:00

## 2020-12-16 NOTE — PROGRESS NOTES
PREOPERATIVE DIAGNOSIS:     Bladder cancer of right side of trigone 09/13/2019; No recurrence since then      POSTOP DIAGNOSIS:                 The same;  No tumors, stones, or CIS of the bladder or bladder neck     PROCEDURE:              Cystoscopy     AN or recurrent or metastatic bladder cancer such as unintentional weight loss, loss of appetite, gross hematuria, or pelvic pain or discomfort. He continues to smoke a cigar every once in a while.  The patient feels this is mild and stable as he is currently kidney stone episodes; most recent 09/2017. Patient continues to follow kidney stone prevention diet including drinking lemon water daily and currently taking allopurinol 100 mg BID; denies side effects.  He presently denies any associated flank pain, dysur pyelogram, insertion of left ureteral stent because of large LEFT UPJ stone; removal of unexpected finding of bladder tumor on the contralateral right side of the bladder; urethra diffusely smaller than typical and calibrated 18-20 [place]; moderate BPH obs no tumors, stones, or CIS of bladder or bladder neck   06/15/2020 Office cystoscopy; no recurrence;  On KAMLA , prostate mildly enlarged, no palpable nodules or indurations; Continue alfuzosin 10 mg daily; Continue allopurinol 100 mg BID          ROS--Patient urothelial carcinoma  15/37/4773 Metabolic 24 hour urine collection = Total Volume - 2200 mL; Citric Acid - 383 (low-normal 320-1240); Oxalate - 46 (high-normal 16-49); Calcium - 128 (normal );  Na Urine - Urine Sodium is below the sensitivity of this collecting system. There is increased retained contrast seen within the left collecting system when compared to the right. Patient's known left renal calculus is not clearly seen on radiographs and may be radiolucent. Contrast is seen within the bladder.  R suggest advanced or recurrent or metastatic bladder cancer. We discussed urine cytology followed by repeat cystoscopy, possible biopsy with oral diazepam 15 mg in 6 months.  I answered all of patient's questions regarding treatment plan, he indicates his un treatment is needed; he indicates his understanding and agrees.      (R35.1) Nocturia  Chronic. Patient has current AUA score of 18, moderate voiding dysfunction; nocturia 3x.  He complains of weak urinary stream at night; states he drinks a lot of water th symptoms.  He feels this is stable and chooses to continue observation.    (Z87.448) History of urethral stricture  09/13/2017 cystoscopy with removal of bladder tumor and left ureteral stent placement, urethra was diffusely narrowed calibrated to 18-20 [place] Continue allopurinol 100 mg twice daily    6. Continue to limit sodium and salt in your diet    7. Continue to drink lots of lemonade on large amounts of water to help prevent kidney stones    8.   To decrease the number of times you wake up at night to u

## 2020-12-16 NOTE — PATIENT INSTRUCTIONS
Lupis Obrien M.D.    1.    . If you have burning with urination, please buy \"AZO\" over the counter medication for burning - take 1 tablet every 8 hours as needed for burning pain.  It makes the color of the urin

## 2020-12-19 ENCOUNTER — PATIENT MESSAGE (OUTPATIENT)
Dept: OTOLARYNGOLOGY | Facility: CLINIC | Age: 65
End: 2020-12-19

## 2020-12-19 NOTE — TELEPHONE ENCOUNTER
From: Morenita Schwartz  To: Andres Faith MD  Sent: 12/19/2020 10:37 AM CST  Subject: Visit Follow-up Question    My throat still has the 'feeling of an obstruction/lump', have been taking the double dosage for gert.     Question -- I'm scheduled for CT URO

## 2020-12-30 ENCOUNTER — HOSPITAL ENCOUNTER (OUTPATIENT)
Dept: CT IMAGING | Facility: HOSPITAL | Age: 65
Discharge: HOME | End: 2020-12-30
Attending: UROLOGY
Payer: MEDICARE

## 2020-12-30 DIAGNOSIS — C67.0 MALIGNANT NEOPLASM OF TRIGONE OF URINARY BLADDER (HCC): ICD-10-CM

## 2020-12-30 DIAGNOSIS — R82.89 ABNORMAL URINE CYTOLOGY: ICD-10-CM

## 2020-12-30 LAB — CREAT BLD-MCNC: 0.8 MG/DL (ref 0.7–1.3)

## 2020-12-30 PROCEDURE — 76376 3D RENDER W/INTRP POSTPROCES: CPT | Performed by: UROLOGY

## 2020-12-30 PROCEDURE — 74178 CT ABD&PLV WO CNTR FLWD CNTR: CPT | Performed by: UROLOGY

## 2020-12-30 PROCEDURE — 76377 3D RENDER W/INTRP POSTPROCES: CPT | Performed by: UROLOGY

## 2020-12-30 PROCEDURE — 82565 ASSAY OF CREATININE: CPT

## 2021-01-01 ENCOUNTER — TELEPHONE (OUTPATIENT)
Dept: OTOLARYNGOLOGY | Facility: CLINIC | Age: 66
End: 2021-01-01

## 2021-01-01 ENCOUNTER — LAB ENCOUNTER (OUTPATIENT)
Dept: LAB | Facility: HOSPITAL | Age: 66
End: 2021-01-01
Attending: OTOLARYNGOLOGY
Payer: MEDICARE

## 2021-01-01 DIAGNOSIS — K13.70 ORAL LESION: Primary | ICD-10-CM

## 2021-01-01 DIAGNOSIS — M54.2 NECK PAIN: Primary | ICD-10-CM

## 2021-01-01 DIAGNOSIS — J39.2 PHARYNGEAL LESION: ICD-10-CM

## 2021-01-01 PROCEDURE — 88305 TISSUE EXAM BY PATHOLOGIST: CPT

## 2021-01-01 NOTE — TELEPHONE ENCOUNTER
I entered an order for an oral biopsy for him as he coughed a piece of something that he wants to have analyzed. Also, I ordered a ct neck for him. Please let him know when he can have it done.

## 2021-01-01 NOTE — TELEPHONE ENCOUNTER
Regarding: RE: Visit Follow-up Question  Contact: [phone]  ----- Message from Mili Baeuchamp RN sent at 12/31/2020 11:34 AM CST -----       ----- Message from Min Jackson to Mookie Weeks MD sent at 12/31/2020 10:51 AM -----   thanks.  please do James Crockett MD didn't like my latest lap tests, though we just did a camera view of bladder etc had no tumors, thus investigating further to see if my cancer returned in new location. Thank you for reviewing, Happy Holidays.     Maya Mckeon

## 2021-01-01 NOTE — TELEPHONE ENCOUNTER
Regarding: RE: Visit Follow-up Question  Contact: [phone]  ----- Message from Kd Segovia RN sent at 12/31/2020 11:34 AM CST -----       ----- Message from Dulce Ramos to Mary Milner MD sent at 12/31/2020 10:51 AM -----   thanks.  please do Greta Garcia MD didn't like my latest lap tests, though we just did a camera view of bladder etc had no tumors, thus investigating further to see if my cancer returned in new location. Thank you for reviewing, Happy Holidays.     Caitlyn Neri

## 2021-01-02 NOTE — TELEPHONE ENCOUNTER
Informed pt of note below and advised will check insurance for prior auth,pt verbalized understanding.

## 2021-01-03 ENCOUNTER — PATIENT MESSAGE (OUTPATIENT)
Dept: FAMILY MEDICINE CLINIC | Facility: CLINIC | Age: 66
End: 2021-01-03

## 2021-01-04 NOTE — TELEPHONE ENCOUNTER
From: Roberto Carlos Hernandez  To: Chula Villalta DO  Sent: 1/3/2021 8:42 PM CST  Subject: Visit Follow-up Question    By this time you have received an email from Carlos Enrique Gomez. ...and know that we are working on a second CT scan on my throat. History:  1.  In August

## 2021-01-06 ENCOUNTER — HOSPITAL ENCOUNTER (OUTPATIENT)
Dept: CT IMAGING | Facility: HOSPITAL | Age: 66
Discharge: HOME | End: 2021-01-06
Attending: OTOLARYNGOLOGY
Payer: MEDICARE

## 2021-01-06 DIAGNOSIS — M54.2 NECK PAIN: ICD-10-CM

## 2021-01-06 PROCEDURE — 70491 CT SOFT TISSUE NECK W/DYE: CPT | Performed by: OTOLARYNGOLOGY

## 2021-01-08 ENCOUNTER — OFFICE VISIT (OUTPATIENT)
Dept: OTOLARYNGOLOGY | Facility: CLINIC | Age: 66
End: 2021-01-08
Payer: COMMERCIAL

## 2021-01-08 VITALS
DIASTOLIC BLOOD PRESSURE: 90 MMHG | SYSTOLIC BLOOD PRESSURE: 140 MMHG | WEIGHT: 290 LBS | HEIGHT: 75 IN | BODY MASS INDEX: 36.06 KG/M2 | TEMPERATURE: 99 F | HEART RATE: 88 BPM

## 2021-01-08 DIAGNOSIS — R59.1 LYMPHADENOPATHY: Primary | ICD-10-CM

## 2021-01-08 PROCEDURE — 3008F BODY MASS INDEX DOCD: CPT | Performed by: OTOLARYNGOLOGY

## 2021-01-08 PROCEDURE — 3080F DIAST BP >= 90 MM HG: CPT | Performed by: OTOLARYNGOLOGY

## 2021-01-08 PROCEDURE — 99213 OFFICE O/P EST LOW 20 MIN: CPT | Performed by: OTOLARYNGOLOGY

## 2021-01-08 PROCEDURE — 3077F SYST BP >= 140 MM HG: CPT | Performed by: OTOLARYNGOLOGY

## 2021-01-09 NOTE — PROGRESS NOTES
Iram Pandya is a 76year old male. Patient presents with:  Results: discuss Ct results,     HPI:   He was having a feeling of something irritating and hurting his throat.   About 2 weeks ago he coughed up a ball of debris and since then that sensation ha have a . (Patient not taking: Reported on 1/8/2021 ) 3 tablet 0   • Polyethyl Glycol-Propyl Glycol (SYSTANE OP) Apply to eye.         Past Medical History:   Diagnosis Date   • Bladder cancer ([facility] [address] 2017   • Calculus of kidney    • Colon polyps    • GE cervical. Supraclavicular.    Eyes Normal Conjunctiva - Right: Normal, Left: Normal. Pupil - Right: Normal, Left: Normal.    Ears Normal Inspection - Right: Normal, Left: Normal. Canal - Left: Normal. TM - Right: Normal, Left: Normal.     ASSESSMENT AND MATEO

## 2021-01-11 ENCOUNTER — LAB ENCOUNTER (OUTPATIENT)
Dept: LAB | Age: 66
End: 2021-01-11
Attending: UROLOGY
Payer: MEDICARE

## 2021-01-11 ENCOUNTER — OFFICE VISIT (OUTPATIENT)
Dept: FAMILY MEDICINE CLINIC | Facility: CLINIC | Age: 66
End: 2021-01-11
Payer: COMMERCIAL

## 2021-01-11 VITALS
HEIGHT: 75 IN | SYSTOLIC BLOOD PRESSURE: 163 MMHG | BODY MASS INDEX: 36.06 KG/M2 | HEART RATE: 82 BPM | DIASTOLIC BLOOD PRESSURE: 111 MMHG | TEMPERATURE: 98 F | WEIGHT: 290 LBS

## 2021-01-11 DIAGNOSIS — R91.1 LUNG NODULE: Primary | ICD-10-CM

## 2021-01-11 DIAGNOSIS — Z12.5 PROSTATE CANCER SCREENING: ICD-10-CM

## 2021-01-11 DIAGNOSIS — M54.50 ACUTE LEFT-SIDED LOW BACK PAIN WITHOUT SCIATICA: ICD-10-CM

## 2021-01-11 LAB — COMPLEXED PSA SERPL-MCNC: 0.93 NG/ML (ref ?–4)

## 2021-01-11 PROCEDURE — 99214 OFFICE O/P EST MOD 30 MIN: CPT | Performed by: FAMILY MEDICINE

## 2021-01-11 PROCEDURE — 36415 COLL VENOUS BLD VENIPUNCTURE: CPT

## 2021-01-11 PROCEDURE — 3077F SYST BP >= 140 MM HG: CPT | Performed by: FAMILY MEDICINE

## 2021-01-11 PROCEDURE — 3008F BODY MASS INDEX DOCD: CPT | Performed by: FAMILY MEDICINE

## 2021-01-11 PROCEDURE — 3080F DIAST BP >= 90 MM HG: CPT | Performed by: FAMILY MEDICINE

## 2021-01-11 RX ORDER — BACLOFEN 10 MG/1
10 TABLET ORAL 3 TIMES DAILY
Qty: 21 TABLET | Refills: 0 | Status: SHIPPED | OUTPATIENT
Start: 2021-01-11 | End: 2021-01-18

## 2021-01-11 NOTE — PROGRESS NOTES
HPI:    Patient ID: Vincenzo Martin is a 76year old male. HPI  Patient presents with:  Lab: follow up. Back Pain: pain is 10 out of 10.   sudden onset back pain. Sudden movement at home caused it, some improvement.  No prior history of serious back pr diagnosis)  Acute left-sided low back pain without sciatica    Follow up Lung CT due to incidental nodule noted on neck CT. Anti spasm med and stretches and heat for now on low back. No orders of the defined types were placed in this encounter.       Me

## 2021-01-12 ENCOUNTER — PATIENT MESSAGE (OUTPATIENT)
Dept: GASTROENTEROLOGY | Facility: CLINIC | Age: 66
End: 2021-01-12

## 2021-01-13 NOTE — TELEPHONE ENCOUNTER
Please schedule him this Friday at 1 PM there is an opening    Thanks    Misa Leon MD  [address] [facility] - Gastroenterology  1/13/2021  2:59 PM

## 2021-01-13 NOTE — TELEPHONE ENCOUNTER
From: Lisa Francois  To:  Erasto Dietrich MD  Sent: 1/12/2021 5:42 PM CST  Subject: Test Results Question    Dear Dr. Talita Marcelino,   Please review my most recent test results - CT Scan's, etc. Can we do a video appointment in that the eariliest online sche

## 2021-01-13 NOTE — TELEPHONE ENCOUNTER
RN spoke to pt. Verified . Scheduled F/U appt with Dr. Shira Hernandez. Verified time, location and to arrive 15 minutes early. Pt voiced understanding. No further questions or concerns at this time.      Future Appointments   Date Time Provider Department Gricelda

## 2021-01-14 NOTE — PROGRESS NOTES
[facility], [facility] - Gastroenterology                                                                                                  Clinic Progress Note    Patient pr • Visual impairment     glasses      Past Surgical History:   Procedure Laterality Date   • BREAST BIOPSY Right 08/01/2014    [facility]   • COLONOSCOPY  07/11/2014    [facility]   • COLONOSCOPY  03/20/2018   • COLONOSCOPY N/A 3/20/2018    Performe capsules (0.8 mg total) by mouth daily. 180 capsule 3   • Fluticasone Propionate 50 MCG/ACT Nasal Suspension 2 sprays by Nasal route daily. 3 Bottle 1   • Montelukast Sodium 10 MG Oral Tab Take 1 tablet (10 mg total) by mouth nightly.  90 tablet 1   • Melox two 1 cm right colon polyps (adenoma and hyperplastic), mild sigmoid colon diverticulosis, and hemorrhoids who is here for follow up    Last seen in February 2020 for LLQ pain thought be boil related though with prior inguinal hernia underwent CT A/P 2/14/ to EGD with the patient [who demonstrated understanding], including but not limited to the risks of bleeding, infection, pain, perforation as well as the cardiopulmonary risks of anesthesia all leading to prolonged hospital stay or surgical intervention.  A

## 2021-01-15 ENCOUNTER — HOSPITAL ENCOUNTER (OUTPATIENT)
Dept: CT IMAGING | Facility: HOSPITAL | Age: 66
Discharge: HOME | End: 2021-01-15
Attending: FAMILY MEDICINE
Payer: MEDICARE

## 2021-01-15 ENCOUNTER — TELEPHONE (OUTPATIENT)
Dept: GASTROENTEROLOGY | Facility: CLINIC | Age: 66
End: 2021-01-15

## 2021-01-15 ENCOUNTER — TELEPHONE (OUTPATIENT)
Dept: SURGERY | Facility: CLINIC | Age: 66
End: 2021-01-15

## 2021-01-15 ENCOUNTER — OFFICE VISIT (OUTPATIENT)
Dept: GASTROENTEROLOGY | Facility: CLINIC | Age: 66
End: 2021-01-15
Payer: COMMERCIAL

## 2021-01-15 VITALS
SYSTOLIC BLOOD PRESSURE: 130 MMHG | WEIGHT: 295 LBS | HEIGHT: 75 IN | DIASTOLIC BLOOD PRESSURE: 80 MMHG | BODY MASS INDEX: 36.68 KG/M2 | HEART RATE: 92 BPM

## 2021-01-15 DIAGNOSIS — K21.9 GASTROESOPHAGEAL REFLUX DISEASE, UNSPECIFIED WHETHER ESOPHAGITIS PRESENT: Primary | ICD-10-CM

## 2021-01-15 DIAGNOSIS — R91.1 LUNG NODULE: ICD-10-CM

## 2021-01-15 DIAGNOSIS — Z12.11 SCREEN FOR COLON CANCER: ICD-10-CM

## 2021-01-15 DIAGNOSIS — Z86.010 HISTORY OF COLON POLYPS: ICD-10-CM

## 2021-01-15 DIAGNOSIS — R59.0 LYMPHADENOPATHY, ABDOMINAL: ICD-10-CM

## 2021-01-15 DIAGNOSIS — Z86.010 ENCOUNTER FOR COLONOSCOPY DUE TO HISTORY OF ADENOMATOUS COLONIC POLYPS: ICD-10-CM

## 2021-01-15 DIAGNOSIS — R93.5 ABNORMAL CT OF THE ABDOMEN: ICD-10-CM

## 2021-01-15 DIAGNOSIS — R59.0 MEDIASTINAL LYMPHADENOPATHY: ICD-10-CM

## 2021-01-15 DIAGNOSIS — Z12.11 ENCOUNTER FOR COLONOSCOPY DUE TO HISTORY OF ADENOMATOUS COLONIC POLYPS: ICD-10-CM

## 2021-01-15 PROCEDURE — 3079F DIAST BP 80-89 MM HG: CPT | Performed by: INTERNAL MEDICINE

## 2021-01-15 PROCEDURE — 3075F SYST BP GE 130 - 139MM HG: CPT | Performed by: INTERNAL MEDICINE

## 2021-01-15 PROCEDURE — 71250 CT THORAX DX C-: CPT | Performed by: FAMILY MEDICINE

## 2021-01-15 PROCEDURE — 3008F BODY MASS INDEX DOCD: CPT | Performed by: INTERNAL MEDICINE

## 2021-01-15 PROCEDURE — 99214 OFFICE O/P EST MOD 30 MIN: CPT | Performed by: INTERNAL MEDICINE

## 2021-01-15 RX ORDER — SODIUM, POTASSIUM,MAG SULFATES 17.5-3.13G
SOLUTION, RECONSTITUTED, ORAL ORAL
Qty: 1 BOTTLE | Refills: 0 | Status: ON HOLD | OUTPATIENT
Start: 2021-01-15 | End: 2021-02-01

## 2021-01-15 NOTE — TELEPHONE ENCOUNTER
Scheduled for:  Colonoscopy [address]99 / EGD [address] / EUS 89738 with possible fine needle aspiration  Provider Name:  Dr. Refugio Holland  Date:  1/22/2021 - ok'd by Dr. Sachin Posey, RN/Matilda  Location:  [address] [place] [address]  Sedation:  MAC  Time:  1:30 pm, arrival 12:30 pm  Pr

## 2021-01-15 NOTE — PATIENT INSTRUCTIONS
1. Schedule upper endoscopy, endoscopic ultrasound (EUS) with possible fine needle aspiration (FNA) and colonoscopy with monitored anesthesia care (MAC) at the hospital with Dr. Concepcion Denney    2.  bowel prep from pharmacy (split Suprep).  As we discuss

## 2021-01-15 NOTE — TELEPHONE ENCOUNTER
Received fax from [employer], for medical clarification request. Completed by MD. Faxed to [email or web address], confirmation received. Copy sent to scanning.

## 2021-01-19 ENCOUNTER — LAB ENCOUNTER (OUTPATIENT)
Dept: LAB | Age: 66
End: 2021-01-19
Attending: INTERNAL MEDICINE
Payer: MEDICARE

## 2021-01-19 DIAGNOSIS — Z01.818 PREOP TESTING: ICD-10-CM

## 2021-01-20 LAB — SARS-COV-2 RNA RESP QL NAA+PROBE: NOT DETECTED

## 2021-01-20 NOTE — TELEPHONE ENCOUNTER
Called [facility] and spoke with Shelley. They will expedite this case and may call the office back.  Office notes have been uploaded to the [facility] website on 1/15/21

## 2021-01-21 ENCOUNTER — PATIENT MESSAGE (OUTPATIENT)
Dept: GASTROENTEROLOGY | Facility: CLINIC | Age: 66
End: 2021-01-21

## 2021-01-21 RX ORDER — BACLOFEN 10 MG/1
10 TABLET ORAL 3 TIMES DAILY
Qty: 21 TABLET | Refills: 0 | Status: SHIPPED | OUTPATIENT
Start: 2021-01-21 | End: 2021-04-03

## 2021-01-21 NOTE — TELEPHONE ENCOUNTER
Dr. Velásquez Slider, please see message below-  Patient has not been approved for procedure tomorrow from [facility]  Do you want patient to be cancelled ? Please advise. Thank you    Patient was advised not to drink his prep till he hears back from us.

## 2021-01-21 NOTE — TELEPHONE ENCOUNTER
Called [facility] today - [phone] to check status and it is still pending with [facility]. We can't guarantee that the procedures will be approved by tomorrow. The case was started on 1/15/21 and expedited yesterday and it still takes 72 hrs to review.

## 2021-01-21 NOTE — TELEPHONE ENCOUNTER
I believe it is ultimately up to the patient, but understandably he may want to reschedule if it is not currently approved by his insurance.     Zoey Orozco MD  [facility], [facility] - Gastroenterology  1/21/2021  4:43 PM

## 2021-01-21 NOTE — TELEPHONE ENCOUNTER
[address] called in to give approval for the procedure tomorrow. I attempted RN no answer. Agent was not able to give me a call back number.

## 2021-01-22 ENCOUNTER — TELEPHONE (OUTPATIENT)
Dept: GASTROENTEROLOGY | Facility: CLINIC | Age: 66
End: 2021-01-22

## 2021-01-22 DIAGNOSIS — Z12.11 COLON CANCER SCREENING: ICD-10-CM

## 2021-01-22 DIAGNOSIS — R59.0 MEDIASTINAL LYMPHADENOPATHY: ICD-10-CM

## 2021-01-22 DIAGNOSIS — K21.9 GASTROESOPHAGEAL REFLUX DISEASE, UNSPECIFIED WHETHER ESOPHAGITIS PRESENT: Primary | ICD-10-CM

## 2021-01-22 DIAGNOSIS — Z86.010 HISTORY OF COLON POLYPS: ICD-10-CM

## 2021-01-22 DIAGNOSIS — R59.0 LYMPHADENOPATHY, ABDOMINAL: ICD-10-CM

## 2021-01-22 DIAGNOSIS — R93.5 ABNORMAL CT OF THE ABDOMEN: ICD-10-CM

## 2021-01-22 NOTE — TELEPHONE ENCOUNTER
Spoke with Prince Winston, from [facility] who gave verbal authorization for procedure as of 1/22/21-4/22/21. Case #[id number].

## 2021-01-22 NOTE — TELEPHONE ENCOUNTER
After being on hold for 60 minutes with [facility], I am still not able to get approval for tomorrows procedure. PA still pending . In clinical review [phone] per Alfa Santizo    Pending approval #[id number]  Procedure will need to be cancelled.   Sandra

## 2021-01-22 NOTE — TELEPHONE ENCOUNTER
Surgery Schedulers,    I spoke to [employer] @ [facility]. She auto approved the colon/EGD/EUS for 90 days. Good until 04/22/2021    Please call the pt to reschedule    Auth # [id number].  Referral updated

## 2021-01-22 NOTE — TELEPHONE ENCOUNTER
I contacted the patient that will reschedule on 2/1/22 and waiting for a CB.  After she reschedules I will contact this patient to reschedule him

## 2021-01-22 NOTE — TELEPHONE ENCOUNTER
What about the EUS? There should be an opening for rescheduling the EGD/EUS and colonoscopy for 2/1.   Please save this spot for him    Thanks    Thelma Ring MD  [facility], [facility] - Gastroenterology  1/22/2021  11:25 AM

## 2021-01-22 NOTE — TELEPHONE ENCOUNTER
Scheduled for:  Colonoscopy [phone], EGD 42838, EUS w/poss FNA 28291  Provider Name:  Dr. Gloria Sood  Date:  2/1/21  Location:    [facility]  Sedation:  MAC  Time:  8908 (pt is aware to arrive at 1415)   Prep:  Suprep, sent instructions via [employer]Allergies R

## 2021-01-23 DIAGNOSIS — R59.0 THORACIC LYMPHADENOPATHY: Primary | ICD-10-CM

## 2021-01-29 ENCOUNTER — LAB ENCOUNTER (OUTPATIENT)
Dept: LAB | Age: 66
End: 2021-01-29
Attending: INTERNAL MEDICINE
Payer: MEDICARE

## 2021-01-29 DIAGNOSIS — Z01.818 PREOP TESTING: ICD-10-CM

## 2021-01-29 LAB — SARS-COV-2 RNA RESP QL NAA+PROBE: NOT DETECTED

## 2021-02-01 ENCOUNTER — ANESTHESIA (OUTPATIENT)
Dept: ENDOSCOPY | Facility: HOSPITAL | Age: 66
End: 2021-02-01
Payer: MEDICARE

## 2021-02-01 ENCOUNTER — ANESTHESIA EVENT (OUTPATIENT)
Dept: ENDOSCOPY | Facility: HOSPITAL | Age: 66
End: 2021-02-01
Payer: MEDICARE

## 2021-02-01 ENCOUNTER — HOSPITAL ENCOUNTER (OUTPATIENT)
Facility: HOSPITAL | Age: 66
Setting detail: HOSPITAL OUTPATIENT SURGERY
Discharge: HOME | End: 2021-02-01
Attending: INTERNAL MEDICINE | Admitting: INTERNAL MEDICINE
Payer: MEDICARE

## 2021-02-01 DIAGNOSIS — K31.9 GASTROPATHY: ICD-10-CM

## 2021-02-01 DIAGNOSIS — R59.0 ABDOMINAL LYMPHADENOPATHY: Primary | ICD-10-CM

## 2021-02-01 DIAGNOSIS — R59.0 MEDIASTINAL LYMPHADENOPATHY: ICD-10-CM

## 2021-02-01 DIAGNOSIS — K64.9 HEMORRHOIDS: ICD-10-CM

## 2021-02-01 DIAGNOSIS — Z01.818 PREOP TESTING: ICD-10-CM

## 2021-02-01 DIAGNOSIS — Z12.11 COLON CANCER SCREENING: ICD-10-CM

## 2021-02-01 DIAGNOSIS — K21.9 GASTROESOPHAGEAL REFLUX DISEASE, UNSPECIFIED WHETHER ESOPHAGITIS PRESENT: ICD-10-CM

## 2021-02-01 DIAGNOSIS — K31.7 GASTRIC POLYP: ICD-10-CM

## 2021-02-01 DIAGNOSIS — Z86.010 HISTORY OF COLON POLYPS: ICD-10-CM

## 2021-02-01 DIAGNOSIS — R59.0 LYMPHADENOPATHY, ABDOMINAL: ICD-10-CM

## 2021-02-01 DIAGNOSIS — K63.5 SIGMOID POLYP: ICD-10-CM

## 2021-02-01 DIAGNOSIS — K63.5 POLYP OF ASCENDING COLON: ICD-10-CM

## 2021-02-01 DIAGNOSIS — R93.5 ABNORMAL CT OF THE ABDOMEN: ICD-10-CM

## 2021-02-01 DIAGNOSIS — K57.90 DIVERTICULOSIS: ICD-10-CM

## 2021-02-01 PROCEDURE — 43242 EGD US FINE NEEDLE BX/ASPIR: CPT | Performed by: INTERNAL MEDICINE

## 2021-02-01 PROCEDURE — 0DBN8ZX EXCISION OF SIGMOID COLON, VIA NATURAL OR ARTIFICIAL OPENING ENDOSCOPIC, DIAGNOSTIC: ICD-10-PCS | Performed by: INTERNAL MEDICINE

## 2021-02-01 PROCEDURE — 0DD28ZX EXTRACTION OF MIDDLE ESOPHAGUS, VIA NATURAL OR ARTIFICIAL OPENING ENDOSCOPIC, DIAGNOSTIC: ICD-10-PCS | Performed by: INTERNAL MEDICINE

## 2021-02-01 PROCEDURE — 0DBK8ZX EXCISION OF ASCENDING COLON, VIA NATURAL OR ARTIFICIAL OPENING ENDOSCOPIC, DIAGNOSTIC: ICD-10-PCS | Performed by: INTERNAL MEDICINE

## 2021-02-01 PROCEDURE — 45380 COLONOSCOPY AND BIOPSY: CPT | Performed by: INTERNAL MEDICINE

## 2021-02-01 PROCEDURE — 43239 EGD BIOPSY SINGLE/MULTIPLE: CPT | Performed by: INTERNAL MEDICINE

## 2021-02-01 PROCEDURE — 0DB68ZX EXCISION OF STOMACH, VIA NATURAL OR ARTIFICIAL OPENING ENDOSCOPIC, DIAGNOSTIC: ICD-10-PCS | Performed by: INTERNAL MEDICINE

## 2021-02-01 PROCEDURE — 45385 COLONOSCOPY W/LESION REMOVAL: CPT | Performed by: INTERNAL MEDICINE

## 2021-02-01 PROCEDURE — 0DBL8ZX EXCISION OF TRANSVERSE COLON, VIA NATURAL OR ARTIFICIAL OPENING ENDOSCOPIC, DIAGNOSTIC: ICD-10-PCS | Performed by: INTERNAL MEDICINE

## 2021-02-01 RX ORDER — SODIUM CHLORIDE, SODIUM LACTATE, POTASSIUM CHLORIDE, CALCIUM CHLORIDE 600; 310; 30; 20 MG/100ML; MG/100ML; MG/100ML; MG/100ML
INJECTION, SOLUTION INTRAVENOUS CONTINUOUS
Status: CANCELLED | OUTPATIENT
Start: 2021-02-01

## 2021-02-01 RX ORDER — SODIUM CHLORIDE, SODIUM LACTATE, POTASSIUM CHLORIDE, CALCIUM CHLORIDE 600; 310; 30; 20 MG/100ML; MG/100ML; MG/100ML; MG/100ML
INJECTION, SOLUTION INTRAVENOUS CONTINUOUS PRN
Status: DISCONTINUED | OUTPATIENT
Start: 2021-02-01 | End: 2021-02-01 | Stop reason: SURG

## 2021-02-01 RX ORDER — SODIUM CHLORIDE, SODIUM LACTATE, POTASSIUM CHLORIDE, CALCIUM CHLORIDE 600; 310; 30; 20 MG/100ML; MG/100ML; MG/100ML; MG/100ML
INJECTION, SOLUTION INTRAVENOUS CONTINUOUS
Status: DISCONTINUED | OUTPATIENT
Start: 2021-02-01 | End: 2021-02-01

## 2021-02-01 RX ORDER — NALOXONE HYDROCHLORIDE 0.4 MG/ML
80 INJECTION, SOLUTION INTRAMUSCULAR; INTRAVENOUS; SUBCUTANEOUS AS NEEDED
Status: CANCELLED | OUTPATIENT
Start: 2021-02-01 | End: 2021-02-01

## 2021-02-01 RX ORDER — LIDOCAINE HYDROCHLORIDE 10 MG/ML
INJECTION, SOLUTION EPIDURAL; INFILTRATION; INTRACAUDAL; PERINEURAL AS NEEDED
Status: DISCONTINUED | OUTPATIENT
Start: 2021-02-01 | End: 2021-02-01 | Stop reason: SURG

## 2021-02-01 RX ADMIN — SODIUM CHLORIDE, SODIUM LACTATE, POTASSIUM CHLORIDE, CALCIUM CHLORIDE: 600; 310; 30; 20 INJECTION, SOLUTION INTRAVENOUS at 14:15:00

## 2021-02-01 RX ADMIN — SODIUM CHLORIDE, SODIUM LACTATE, POTASSIUM CHLORIDE, CALCIUM CHLORIDE: 600; 310; 30; 20 INJECTION, SOLUTION INTRAVENOUS at 15:20:00

## 2021-02-01 RX ADMIN — LIDOCAINE HYDROCHLORIDE 50 MG: 10 INJECTION, SOLUTION EPIDURAL; INFILTRATION; INTRACAUDAL; PERINEURAL at 14:15:00

## 2021-02-01 NOTE — OPERATIVE REPORT
Colonoscopy Report    Jony Shalom     1955 Age 76year old   PCP Juliette Klein DO Endoscopist Zoey Orozco MD     Date of procedure: 21    Procedure: Colonoscopy w/ biopsy and snare polypectomy    Pre-operative diagnosis: history signs were monitored throughout the procedure and remained stable.     Estimated blood loss: insignificant    Specimens collected:  Colon polyps    Complications: none     Colonoscopy findings:    Cecum: normal mucosa and vascular pattern    Ascending colon

## 2021-02-01 NOTE — OPERATIVE REPORT
Esophagogastroduodenoscopy (EGD) & Endoscopic Ultrasound (EUS) Report    Jaydon GLASS 1955 Age 76year old   PCP Federico Mcnally DO Endoscopist Jose Fields MD     Date of procedure: 21    Procedure: EGD w/ biopsy and EUS esophag lymph node biopsy    Complications: none    EGD findings:    1. Esophagus: The squamocolumnar junction was noted at 44 cm and appeared unremarkable. The esophageal mucosa appeared unremarkable. 2. Stomach: The stomach distended normally.  Normal rugal fold

## 2021-02-01 NOTE — ANESTHESIA POSTPROCEDURE EVALUATION
Patient: Liam Tripathi    Procedure Summary     Date: 02/01/21 Room / Location: [address] [place] [address] ENDOSCOPY 01 / [address] [place] [address] ENDOSCOPY    Anesthesia Start: 4103 Anesthesia Stop:     Procedures:       COLONOSCOPY (N/A )      ESOPHAGOGASTRODUODENOSCOPY (EGD) (N/A )      ENDOSCOP

## 2021-02-01 NOTE — H&P
History & Physical Examination    Patient Name: Glenn Barnett  MRN: [id number]  [facility]: [id number]  YOB: 1955    Diagnosis: GERD, history of adenomatous colon polyps, abnormal CT abdomen, abdominal lymphadenopathy, mediastinal lymphadenopathy • Visual impairment     glasses     Past Surgical History:   Procedure Laterality Date   • BREAST BIOPSY Right 08/01/2014    [facility]   • COLONOSCOPY  07/11/2014    [facility]   • COLONOSCOPY  03/20/2018   • COLONOSCOPY N/A 3/20/2018    Performe alternatives of the procedure with the patient/family. They understand and agree to proceed with plan of care. I have reviewed the History and Physical done within the last 30 days. Any changes noted above.   MD Nieves Taylor [phone]

## 2021-02-01 NOTE — ANESTHESIA PREPROCEDURE EVALUATION
Anesthesia PreOp Note    HPI:     Liam Tripathi is a 76year old male who presents for preoperative consultation requested by: Micah Burkett MD    Date of Surgery: 2/1/2021    Procedure(s):  COLONOSCOPY  ESOPHAGOGASTRODUODENOSCOPY (EGD)  ENDOSCOPIC [place]   • COLONOSCOPY  03/20/2018   • COLONOSCOPY N/A 3/20/2018    Performed by Tashia Ramirez MD at [place] [address] Left 9/13/2017    Performed by Eliane Abreu MD at [facility]   • [facility] Left 9/1 capsule, Rfl: 3, 2/1/2021    •  Polyethyl Glycol-Propyl Glycol (SYSTANE OP), Apply to eye., Disp: , Rfl: ,  at prn    •  cetirizine 10 MG Oral Tab, Take 10 mg by mouth daily. , Disp: , Rfl: , 1/31/2021        •  lactated ringers infusion, , Intravenous, Con status: Not on file      Intimate partner violence        Fear of current or ex partner: Not on file        Emotionally abused: Not on file        Physically abused: Not on file        Forced sexual activity: Not on file    Other Topics      Concerns: benefits, risks including possible dental damage if relevant, major complications, and any alternative forms of anesthetic management. All of the patient's questions were answered to the best of my ability.  The patient desires the anesthetic management a

## 2021-02-02 VITALS
RESPIRATION RATE: 16 BRPM | SYSTOLIC BLOOD PRESSURE: 123 MMHG | TEMPERATURE: 98 F | DIASTOLIC BLOOD PRESSURE: 80 MMHG | WEIGHT: 295 LBS | BODY MASS INDEX: 36.68 KG/M2 | OXYGEN SATURATION: 97 % | HEIGHT: 75 IN | HEART RATE: 70 BPM

## 2021-02-04 ENCOUNTER — TELEPHONE (OUTPATIENT)
Dept: GASTROENTEROLOGY | Facility: CLINIC | Age: 66
End: 2021-02-04

## 2021-02-04 ENCOUNTER — TELEPHONE (OUTPATIENT)
Dept: HEMATOLOGY/ONCOLOGY | Facility: HOSPITAL | Age: 66
End: 2021-02-04

## 2021-02-04 LAB
CD10 CELLS NFR SPEC: <1 %
CD11C CELLS NFR SPEC: 15 %
CD14 CELLS NFR SPEC: 1 %
CD19 CELLS NFR SPEC: 32 %
CD19/CD10 CELLS: <1 %
CD20 CELLS NFR SPEC: 34 %
CD22 CELLS NFR SPEC: 31 %
CD23 CELLS NFR SPEC: 26 %
CD3 CELLS NFR SPEC: 63 %
CD3+CD4+ CELLS NFR SPEC: 53 %
CD3+CD4+ CELLS/CD3+CD8+ CLL SPEC: 5.9
CD3+CD8+ CELLS NFR SPEC: 9 %
CD45 CELLS NFR SPEC: 100 %
CD5 CELLS NFR SPEC: 90 %
CD5/CD19 CELLS: 27 %
CD56 CELLS NFR SPEC: 1 %
CD7 CELLS NFR SPEC: 61 %
CELL SURF KAPPA/LAMBDA RATIO: 0.1
CELL SURF LAMBDA LIGHT CHAIN: 32 %
CELL SURFACE KAPPA LIGHT CHAIN: 4 %
FMC7 CELLS NFR SPEC: 6 %

## 2021-02-04 NOTE — TELEPHONE ENCOUNTER
Dr. Taryn Diez from Pathology called in requesting to speak directly to RN to give an update on the patient's diagnosis. Attempting to call RN now. RN no answer. Dr. Tayrn Diez states he will try to call again later.  Please follow up

## 2021-02-04 NOTE — TELEPHONE ENCOUNTER
Health maintenance updated. 3 year colonoscopy recall placed in patient outreach. Next due on 02/01/2024 per Dr. Bridget Nuno.

## 2021-02-04 NOTE — TELEPHONE ENCOUNTER
Called patient to introduce myself and explained my role as patient navigator to him and then confirmed a new consult appointment with Dr. Lico Barton for Friday, 2/5/21 at 11:15 am.  I reviewed where patient needed to park and check in for this appointment, and

## 2021-02-04 NOTE — TELEPHONE ENCOUNTER
Called the patient and spoke to him regarding pathology results    Discussed path from the EUS/FNA of the paraesophageal lymph node Monday is consistent with lymphoma. Discussed next step is oncology evaluation.     Will have oncology PA reach out to hi

## 2021-02-04 NOTE — TELEPHONE ENCOUNTER
Dr. Hollis Montgomery calling with pathology results. Please see message below.   Thank you    Related Result Highlights           CYTOLOGY FLUIDS  Final result 2/1/2021   Final Diagnosis:      Rosa-esophageal lymph node; endoscopic ultrasound-guided fine needle aspir The patient's history of bladder cancer, per notes, and lymphadenopathy in the chest, abdomen and pelvis is noted.      The fine needle aspiration of the mariah-esophageal lymph node demonstrates lymph node elements including numerous small mature lymphocyte

## 2021-02-05 ENCOUNTER — OFFICE VISIT (OUTPATIENT)
Dept: HEMATOLOGY/ONCOLOGY | Facility: HOSPITAL | Age: 66
End: 2021-02-05
Attending: INTERNAL MEDICINE
Payer: MEDICARE

## 2021-02-05 VITALS
HEIGHT: 75 IN | OXYGEN SATURATION: 94 % | TEMPERATURE: 99 F | SYSTOLIC BLOOD PRESSURE: 154 MMHG | BODY MASS INDEX: 36.8 KG/M2 | DIASTOLIC BLOOD PRESSURE: 86 MMHG | WEIGHT: 296 LBS | HEART RATE: 97 BPM | RESPIRATION RATE: 18 BRPM

## 2021-02-05 DIAGNOSIS — C67.9 MALIGNANT NEOPLASM OF URINARY BLADDER, UNSPECIFIED SITE (HCC): ICD-10-CM

## 2021-02-05 DIAGNOSIS — C83.00 SMALL LYMPHOCYTIC LYMPHOMA (HCC): Primary | ICD-10-CM

## 2021-02-05 PROCEDURE — 99205 OFFICE O/P NEW HI 60 MIN: CPT | Performed by: INTERNAL MEDICINE

## 2021-02-05 NOTE — TELEPHONE ENCOUNTER
Non-urology developments noted; recent nonurology messages reviewed and noted. Continue with urology treatment plans as scheduled.   Dr. Ramesh Babb

## 2021-02-08 NOTE — CONSULTS
[facility]    PATIENT'S NAME: JESSIE BARBOSA   CONSULTING PHYSICIAN: Victor M Merchant MD   PATIENT ACCOUNT #: [de-identified] LOCATION: [address] [place] [address] RECORD #: [id number] YOB: 1955   CONSULTATION DATE: 02/05/2021       CANCER CENT Comprehensive metabolic panel from the same day, creatinine was 0.89. Liver tests, ALT 61, AST 34, alkaline phosphatase 66, bilirubin 0.7. The patient states he is able to complete his activities of daily living.   He does have some occasional discomfo AND PLAN:  The patient is a very pleasant 39-year-old male with a history of low-grade non-muscle invasive bladder cancer status post TURBT in 2017, now being evaluated by Medical Oncology for small lymphocytic lymphoma.   The patient has disease both above

## 2021-02-09 ENCOUNTER — LAB ENCOUNTER (OUTPATIENT)
Dept: LAB | Facility: REFERENCE LAB | Age: 66
End: 2021-02-09
Attending: INTERNAL MEDICINE
Payer: MEDICARE

## 2021-02-09 DIAGNOSIS — C83.00 SMALL LYMPHOCYTIC LYMPHOMA (HCC): ICD-10-CM

## 2021-02-09 LAB
ALBUMIN SERPL-MCNC: 3.6 G/DL (ref 3.4–5)
ALBUMIN/GLOB SERPL: 1.1 {RATIO} (ref 1–2)
ALP LIVER SERPL-CCNC: 83 U/L
ALT SERPL-CCNC: 56 U/L
ANION GAP SERPL CALC-SCNC: 9 MMOL/L (ref 0–18)
AST SERPL-CCNC: 29 U/L (ref 15–37)
BASOPHILS # BLD AUTO: 0.08 X10(3) UL (ref 0–0.2)
BASOPHILS NFR BLD AUTO: 0.8 %
BILIRUB SERPL-MCNC: 0.4 MG/DL (ref 0.1–2)
BUN BLD-MCNC: 17 MG/DL (ref 7–18)
BUN/CREAT SERPL: 18.1 (ref 10–20)
CALCIUM BLD-MCNC: 8.7 MG/DL (ref 8.5–10.1)
CHLORIDE SERPL-SCNC: 108 MMOL/L (ref 98–112)
CO2 SERPL-SCNC: 26 MMOL/L (ref 21–32)
CREAT BLD-MCNC: 0.94 MG/DL
DEPRECATED RDW RBC AUTO: 46.5 FL (ref 35.1–46.3)
EOSINOPHIL # BLD AUTO: 0.15 X10(3) UL (ref 0–0.7)
EOSINOPHIL NFR BLD AUTO: 1.6 %
ERYTHROCYTE [DISTWIDTH] IN BLOOD BY AUTOMATED COUNT: 13.1 % (ref 11–15)
GLOBULIN PLAS-MCNC: 3.3 G/DL (ref 2.8–4.4)
GLUCOSE BLD-MCNC: 89 MG/DL (ref 70–99)
HCT VFR BLD AUTO: 45.9 %
HGB BLD-MCNC: 15.5 G/DL
IMM GRANULOCYTES # BLD AUTO: 0.04 X10(3) UL (ref 0–1)
IMM GRANULOCYTES NFR BLD: 0.4 %
LDH SERPL L TO P-CCNC: 167 U/L
LYMPHOCYTES # BLD AUTO: 3.72 X10(3) UL (ref 1–4)
LYMPHOCYTES NFR BLD AUTO: 39.3 %
M PROTEIN MFR SERPL ELPH: 6.9 G/DL (ref 6.4–8.2)
MCH RBC QN AUTO: 33 PG (ref 26–34)
MCHC RBC AUTO-ENTMCNC: 33.8 G/DL (ref 31–37)
MCV RBC AUTO: 97.9 FL
MONOCYTES # BLD AUTO: 0.74 X10(3) UL (ref 0.1–1)
MONOCYTES NFR BLD AUTO: 7.8 %
NEUTROPHILS # BLD AUTO: 4.74 X10 (3) UL (ref 1.5–7.7)
NEUTROPHILS # BLD AUTO: 4.74 X10(3) UL (ref 1.5–7.7)
NEUTROPHILS NFR BLD AUTO: 50.1 %
OSMOLALITY SERPL CALC.SUM OF ELEC: 297 MOSM/KG (ref 275–295)
PATIENT FASTING Y/N/NP: NO
PLATELET # BLD AUTO: 200 10(3)UL (ref 150–450)
POTASSIUM SERPL-SCNC: 4.1 MMOL/L (ref 3.5–5.1)
RBC # BLD AUTO: 4.69 X10(6)UL
SODIUM SERPL-SCNC: 143 MMOL/L (ref 136–145)
WBC # BLD AUTO: 9.5 X10(3) UL (ref 4–11)

## 2021-02-09 PROCEDURE — 85025 COMPLETE CBC W/AUTO DIFF WBC: CPT

## 2021-02-09 PROCEDURE — 80053 COMPREHEN METABOLIC PANEL: CPT

## 2021-02-09 PROCEDURE — 36415 COLL VENOUS BLD VENIPUNCTURE: CPT

## 2021-02-09 PROCEDURE — 83615 LACTATE (LD) (LDH) ENZYME: CPT

## 2021-02-10 NOTE — PROGRESS NOTES
Please let the patient know that I spoke with Dr. Lico Barton about his results.  He would like me to take a look at his throat again to make sure that there is nothing there that potentially needs to evaluated or biopsied as it is possible that that could change

## 2021-02-15 ENCOUNTER — TELEPHONE (OUTPATIENT)
Dept: OTOLARYNGOLOGY | Facility: CLINIC | Age: 66
End: 2021-02-15

## 2021-02-15 NOTE — TELEPHONE ENCOUNTER
Author: Velasquez Chew MD Service: [place] Type: Physician   Filed: 2/10/2021  8:49 AM Encounter Date: 2/5/2021 Status: Signed   : Velasquez Chew MD (Physician)        Show:Clear all  [x]Manual[]Template[]Copied    Added by:  [x]Bing Major MD

## 2021-02-16 ENCOUNTER — OFFICE VISIT (OUTPATIENT)
Dept: OTOLARYNGOLOGY | Facility: CLINIC | Age: 66
End: 2021-02-16
Payer: COMMERCIAL

## 2021-02-16 VITALS
DIASTOLIC BLOOD PRESSURE: 83 MMHG | HEIGHT: 75 IN | BODY MASS INDEX: 36.8 KG/M2 | WEIGHT: 296 LBS | TEMPERATURE: 99 F | SYSTOLIC BLOOD PRESSURE: 134 MMHG

## 2021-02-16 DIAGNOSIS — J39.2 PHARYNGEAL LESION: Primary | ICD-10-CM

## 2021-02-16 PROCEDURE — 99213 OFFICE O/P EST LOW 20 MIN: CPT | Performed by: OTOLARYNGOLOGY

## 2021-02-16 PROCEDURE — 3008F BODY MASS INDEX DOCD: CPT | Performed by: OTOLARYNGOLOGY

## 2021-02-16 PROCEDURE — 3079F DIAST BP 80-89 MM HG: CPT | Performed by: OTOLARYNGOLOGY

## 2021-02-16 PROCEDURE — 31575 DIAGNOSTIC LARYNGOSCOPY: CPT | Performed by: OTOLARYNGOLOGY

## 2021-02-16 PROCEDURE — 3075F SYST BP GE 130 - 139MM HG: CPT | Performed by: OTOLARYNGOLOGY

## 2021-02-16 NOTE — PROGRESS NOTES
Jaydon Fuller is a 76year old male. Patient presents with: Follow - Up: lymphadenopathy    HPI:   He was diagnosed with a low-grade lymphoma from the lymph nodes in his paraesophageal region with endoscopic ultrasound biopsy.   He has had other imaging s standard drinks      Types: 2 Glasses of wine per week      Frequency: 4 or more times a week      Drinks per session: 3 or 4      Binge frequency: Never      Comment: occasionally    Drug use: No       REVIEW OF SYSTEMS:   GENERAL HEALTH: feels well other nose.    Laryngoscopy:  Flexible Fiberoptic Laryngoscopy: A diagnostic flexible fiberoptic laryngoscopy was performed. The flexible fiberoptic laryngoscope was placed into the nose or mouth and advanced  into the interior of the larynx.  A thorough examinat

## 2021-03-11 ENCOUNTER — TELEPHONE (OUTPATIENT)
Dept: FAMILY MEDICINE CLINIC | Facility: CLINIC | Age: 66
End: 2021-03-11

## 2021-03-11 NOTE — TELEPHONE ENCOUNTER
[facility] with Dr. Nilo Leigh for 2021

## 2021-03-12 DIAGNOSIS — Z23 NEED FOR VACCINATION: ICD-10-CM

## 2021-03-19 ENCOUNTER — OFFICE VISIT (OUTPATIENT)
Dept: HEMATOLOGY/ONCOLOGY | Facility: HOSPITAL | Age: 66
End: 2021-03-19
Attending: INTERNAL MEDICINE
Payer: MEDICARE

## 2021-03-19 VITALS
BODY MASS INDEX: 36.8 KG/M2 | OXYGEN SATURATION: 97 % | WEIGHT: 296 LBS | DIASTOLIC BLOOD PRESSURE: 69 MMHG | HEIGHT: 75 IN | RESPIRATION RATE: 18 BRPM | TEMPERATURE: 98 F | HEART RATE: 84 BPM | SYSTOLIC BLOOD PRESSURE: 142 MMHG

## 2021-03-19 DIAGNOSIS — C83.00 SMALL LYMPHOCYTIC LYMPHOMA (HCC): Primary | ICD-10-CM

## 2021-03-19 DIAGNOSIS — R91.1 PULMONARY NODULE: ICD-10-CM

## 2021-03-19 DIAGNOSIS — C67.9 MALIGNANT NEOPLASM OF URINARY BLADDER, UNSPECIFIED SITE (HCC): ICD-10-CM

## 2021-03-19 PROCEDURE — 99214 OFFICE O/P EST MOD 30 MIN: CPT | Performed by: INTERNAL MEDICINE

## 2021-03-19 NOTE — PROGRESS NOTES
Cancer Center Progress Note    Patient Name: Navya Campos   YOB: 1955   Medical Record Number: [id number]   Attending Physician: Helen Echeverria M.D.      Chief Complaint:   Small lymphocytic lymphoma    History of Present Illness:   65-year-ol Gynecomastia 2014    right   • Hearing impairment     left ear, hearing aid   • Hypercholesterolemia 9/10/2020   • Kidney stone    • Osteoarthritis    • Sleep apnea     never finished testing - no device at this time   • Urethral stricture    • Visual impa Sister 61        lupus   • Cancer Brother        Social History:  Social History    Socioeconomic History      Marital status:       Spouse name: Not on file      Number of children: 1      Years of education: Not on file      Highest education leve Rfl: 3  •  Fluticasone Propionate 50 MCG/ACT Nasal Suspension, 2 sprays by Nasal route daily. , Disp: 3 Bottle, Rfl: 1  •  Montelukast Sodium 10 MG Oral Tab, Take 1 tablet (10 mg total) by mouth nightly., Disp: 90 tablet, Rfl: 1  •  Meloxicam 15 MG Oral Tab Ht 1.905 m (6' 3\")   Wt 134.3 kg (296 lb)   SpO2 97%   BMI 37.00 kg/m²     Physical Examination:  General: Patient is alert and oriented x 3, not in acute distress. Psych:  Mood and affect appropriate  HEENT: EOMs intact. PERRL. Oropharynx is clear.    Ne symptoms and labs only with further imaging only if there is a change in symptoms          Alfredo Ruelas MD

## 2021-03-29 RX ORDER — OMEPRAZOLE 40 MG/1
CAPSULE, DELAYED RELEASE ORAL
Qty: 90 CAPSULE | Refills: 3 | Status: SHIPPED | OUTPATIENT
Start: 2021-03-29 | End: 2022-01-24

## 2021-03-29 NOTE — TELEPHONE ENCOUNTER
Requested Prescriptions     Pending Prescriptions Disp Refills   • OMEPRAZOLE 40 MG Oral Capsule Delayed Release [Pharmacy Med Name: OMEPRAZOLE  40MG  CAP] 90 capsule 3     Sig: TAKE 1 CAPSULE BY MOUTH  DAILY BEFORE BREAKFAST     LOV 1/15/2021  Last Colon/

## 2021-04-01 RX ORDER — MELOXICAM 15 MG/1
TABLET ORAL
Qty: 90 TABLET | Refills: 3 | Status: SHIPPED | OUTPATIENT
Start: 2021-04-01 | End: 2022-01-24

## 2021-04-04 ENCOUNTER — PATIENT MESSAGE (OUTPATIENT)
Dept: FAMILY MEDICINE CLINIC | Facility: CLINIC | Age: 66
End: 2021-04-04

## 2021-04-05 NOTE — TELEPHONE ENCOUNTER
From: Chidi Knight  To: Katherine Peters DO  Sent: 4/4/2021 10:01 AM CDT  Subject: Prescription Question    RE: baclofen 10 MG Tabs    We sold our home and moved everything - three semi trucks full. Anyway, my back is out again.  Had a couple days of Bacl

## 2021-04-06 RX ORDER — BACLOFEN 10 MG/1
10 TABLET ORAL 3 TIMES DAILY
Qty: 21 TABLET | Refills: 0 | Status: SHIPPED | OUTPATIENT
Start: 2021-04-06 | End: 2021-05-21 | Stop reason: ALTCHOICE

## 2021-04-13 RX ORDER — MONTELUKAST SODIUM 10 MG/1
TABLET ORAL
Qty: 90 TABLET | Refills: 1 | Status: SHIPPED | OUTPATIENT
Start: 2021-04-13 | End: 2021-10-21

## 2021-04-19 ENCOUNTER — HOSPITAL ENCOUNTER (OUTPATIENT)
Dept: GENERAL RADIOLOGY | Facility: HOSPITAL | Age: 66
Discharge: HOME | End: 2021-04-19
Attending: FAMILY MEDICINE
Payer: MEDICARE

## 2021-04-19 ENCOUNTER — OFFICE VISIT (OUTPATIENT)
Dept: FAMILY MEDICINE CLINIC | Facility: CLINIC | Age: 66
End: 2021-04-19
Payer: COMMERCIAL

## 2021-04-19 VITALS
DIASTOLIC BLOOD PRESSURE: 90 MMHG | SYSTOLIC BLOOD PRESSURE: 149 MMHG | HEART RATE: 90 BPM | BODY MASS INDEX: 36.56 KG/M2 | WEIGHT: 294 LBS | HEIGHT: 75 IN

## 2021-04-19 DIAGNOSIS — M54.50 LUMBAR BACK PAIN: ICD-10-CM

## 2021-04-19 DIAGNOSIS — M54.50 LUMBAR BACK PAIN: Primary | ICD-10-CM

## 2021-04-19 PROCEDURE — 3080F DIAST BP >= 90 MM HG: CPT | Performed by: FAMILY MEDICINE

## 2021-04-19 PROCEDURE — 99214 OFFICE O/P EST MOD 30 MIN: CPT | Performed by: FAMILY MEDICINE

## 2021-04-19 PROCEDURE — 72110 X-RAY EXAM L-2 SPINE 4/>VWS: CPT | Performed by: FAMILY MEDICINE

## 2021-04-19 PROCEDURE — 3008F BODY MASS INDEX DOCD: CPT | Performed by: FAMILY MEDICINE

## 2021-04-19 PROCEDURE — 3077F SYST BP >= 140 MM HG: CPT | Performed by: FAMILY MEDICINE

## 2021-04-19 RX ORDER — HYDROCODONE BITARTRATE AND ACETAMINOPHEN 7.5; 325 MG/1; MG/1
1 TABLET ORAL EVERY 4 HOURS PRN
Qty: 30 TABLET | Refills: 0 | Status: SHIPPED | OUTPATIENT
Start: 2021-04-19 | End: 2021-05-19

## 2021-04-19 NOTE — PROGRESS NOTES
HPI/Subjective:   Patient ID: Citlaly Petti is a 76year old male. HPI  Acute exacerbation of chronic low back pain. Worse than usual.  No injury. History/Other:   Review of Systems   Constitutional: Negative. Gastrointestinal: Negative.     Hilda Prescriptions Disp Refills   • HYDROcodone-acetaminophen 7.5-325 MG Oral Tab 30 tablet 0     Sig: Take 1 tablet by mouth every 4 (four) hours as needed for Pain.        Imaging & Referrals:  None

## 2021-05-04 PROBLEM — M54.50 ACUTE BILATERAL LOW BACK PAIN WITHOUT SCIATICA: Status: ACTIVE | Noted: 2021-05-04

## 2021-05-06 PROBLEM — G89.29 CHRONIC BILATERAL LOW BACK PAIN WITHOUT SCIATICA: Status: ACTIVE | Noted: 2021-05-04

## 2021-05-06 PROBLEM — M54.50 CHRONIC BILATERAL LOW BACK PAIN WITHOUT SCIATICA: Status: ACTIVE | Noted: 2021-05-04

## 2021-05-12 ENCOUNTER — TELEPHONE (OUTPATIENT)
Dept: FAMILY MEDICINE CLINIC | Facility: CLINIC | Age: 66
End: 2021-05-12

## 2021-05-18 PROBLEM — M51.26 HERNIATED NUCLEUS PULPOSUS, L4-5 LEFT: Status: ACTIVE | Noted: 2021-05-18

## 2021-05-18 PROBLEM — M47.817 LUMBOSACRAL SPONDYLOSIS WITHOUT MYELOPATHY: Status: ACTIVE | Noted: 2021-05-18

## 2021-05-19 ENCOUNTER — HOSPITAL ENCOUNTER (OUTPATIENT)
Dept: CT IMAGING | Facility: HOSPITAL | Age: 66
Discharge: HOME | End: 2021-05-19
Attending: INTERNAL MEDICINE
Payer: MEDICARE

## 2021-05-19 DIAGNOSIS — R91.1 PULMONARY NODULE: ICD-10-CM

## 2021-05-19 DIAGNOSIS — C67.9 MALIGNANT NEOPLASM OF URINARY BLADDER, UNSPECIFIED SITE (HCC): ICD-10-CM

## 2021-05-19 DIAGNOSIS — C83.00 SMALL LYMPHOCYTIC LYMPHOMA (HCC): ICD-10-CM

## 2021-05-19 PROCEDURE — 74177 CT ABD & PELVIS W/CONTRAST: CPT | Performed by: INTERNAL MEDICINE

## 2021-05-19 PROCEDURE — 82565 ASSAY OF CREATININE: CPT

## 2021-05-19 PROCEDURE — 71260 CT THORAX DX C+: CPT | Performed by: INTERNAL MEDICINE

## 2021-05-21 ENCOUNTER — LAB ENCOUNTER (OUTPATIENT)
Dept: LAB | Facility: HOSPITAL | Age: 66
End: 2021-05-21
Attending: INTERNAL MEDICINE
Payer: MEDICARE

## 2021-05-21 ENCOUNTER — OFFICE VISIT (OUTPATIENT)
Dept: HEMATOLOGY/ONCOLOGY | Facility: HOSPITAL | Age: 66
End: 2021-05-21
Attending: INTERNAL MEDICINE
Payer: MEDICARE

## 2021-05-21 VITALS
SYSTOLIC BLOOD PRESSURE: 123 MMHG | OXYGEN SATURATION: 94 % | DIASTOLIC BLOOD PRESSURE: 70 MMHG | HEIGHT: 75 IN | TEMPERATURE: 99 F | WEIGHT: 294 LBS | HEART RATE: 89 BPM | RESPIRATION RATE: 18 BRPM | BODY MASS INDEX: 36.56 KG/M2

## 2021-05-21 DIAGNOSIS — C67.9 MALIGNANT NEOPLASM OF URINARY BLADDER, UNSPECIFIED SITE (HCC): ICD-10-CM

## 2021-05-21 DIAGNOSIS — C83.00 SMALL LYMPHOCYTIC LYMPHOMA (HCC): Primary | ICD-10-CM

## 2021-05-21 DIAGNOSIS — R91.1 PULMONARY NODULE: ICD-10-CM

## 2021-05-21 DIAGNOSIS — C83.00 SMALL LYMPHOCYTIC LYMPHOMA (HCC): ICD-10-CM

## 2021-05-21 DIAGNOSIS — Z12.5 PROSTATE CANCER SCREENING: ICD-10-CM

## 2021-05-21 PROCEDURE — 80053 COMPREHEN METABOLIC PANEL: CPT

## 2021-05-21 PROCEDURE — 99215 OFFICE O/P EST HI 40 MIN: CPT | Performed by: INTERNAL MEDICINE

## 2021-05-21 PROCEDURE — 36415 COLL VENOUS BLD VENIPUNCTURE: CPT

## 2021-05-21 PROCEDURE — 85025 COMPLETE CBC W/AUTO DIFF WBC: CPT

## 2021-05-21 PROCEDURE — 83615 LACTATE (LD) (LDH) ENZYME: CPT

## 2021-05-21 NOTE — PROGRESS NOTES
Cancer Center Progress Note    Patient Name: Zandra Arauz   YOB: 1955   Medical Record Number: [id number]   Attending Physician: Eladio Pan M.D.      Chief Complaint:   Small lymphocytic lymphoma    History of Present Illness:  50-year-old 2014    right   • Hearing impairment     left ear, hearing aid   • Hypercholesterolemia 9/10/2020   • Kidney stone    • Osteoarthritis    • Sleep apnea     never finished testing - no device at this time   • Urethral stricture    • Visual impairment     gl drinks        Types: 2 Glasses of wine per week        Comment: occasionally      Drug use: No        Current Medications:    Current Outpatient Medications:   •  MONTELUKAST SODIUM 10 MG Oral Tab, TAKE 1 TABLET BY MOUTH AT  NIGHT, Disp: 90 tablet, Rfl: 1 PRN, Abbi Chambers MD, 80 mL at 05/19/21 1345          Allergies:  No Known Allergies     Review of Systems:  All other systems reviewed and negative x12    Vital Signs:  /70 (BP Location: Right arm, Patient Position: Sitting, Cuff Size: large)   Pu surveillance with next follow-up in approximately 12 months. 2.  Coronary atherosclerosis.    3.  Scattered lymph nodes within the axilla and mediastinum/flavio.  Few of the nodes are enlarged by size criteria and overall have decreased in size since most r

## 2021-05-24 RX ORDER — FLUTICASONE PROPIONATE 50 MCG
SPRAY, SUSPENSION (ML) NASAL
Qty: 48 G | Refills: 0 | Status: SHIPPED | OUTPATIENT
Start: 2021-05-24 | End: 2021-07-23

## 2021-05-25 ENCOUNTER — APPOINTMENT (OUTPATIENT)
Dept: HEMATOLOGY/ONCOLOGY | Facility: HOSPITAL | Age: 66
End: 2021-05-25
Attending: INTERNAL MEDICINE
Payer: MEDICARE

## 2021-06-07 ENCOUNTER — NURSE TRIAGE (OUTPATIENT)
Dept: FAMILY MEDICINE CLINIC | Facility: CLINIC | Age: 66
End: 2021-06-07

## 2021-06-07 ENCOUNTER — OFFICE VISIT (OUTPATIENT)
Dept: FAMILY MEDICINE CLINIC | Facility: CLINIC | Age: 66
End: 2021-06-07
Payer: COMMERCIAL

## 2021-06-07 ENCOUNTER — LAB ENCOUNTER (OUTPATIENT)
Dept: LAB | Age: 66
End: 2021-06-07
Attending: UROLOGY
Payer: MEDICARE

## 2021-06-07 VITALS
BODY MASS INDEX: 36.06 KG/M2 | WEIGHT: 290 LBS | HEIGHT: 75 IN | SYSTOLIC BLOOD PRESSURE: 142 MMHG | HEART RATE: 69 BPM | DIASTOLIC BLOOD PRESSURE: 87 MMHG

## 2021-06-07 DIAGNOSIS — S09.90XA INJURY OF HEAD, INITIAL ENCOUNTER: Primary | ICD-10-CM

## 2021-06-07 DIAGNOSIS — R82.89 ABNORMAL URINE CYTOLOGY: ICD-10-CM

## 2021-06-07 DIAGNOSIS — C67.0 MALIGNANT NEOPLASM OF TRIGONE OF URINARY BLADDER (HCC): ICD-10-CM

## 2021-06-07 DIAGNOSIS — R42 VERTIGO: ICD-10-CM

## 2021-06-07 PROCEDURE — 88108 CYTOPATH CONCENTRATE TECH: CPT

## 2021-06-07 PROCEDURE — 99213 OFFICE O/P EST LOW 20 MIN: CPT | Performed by: FAMILY MEDICINE

## 2021-06-07 PROCEDURE — 3077F SYST BP >= 140 MM HG: CPT | Performed by: FAMILY MEDICINE

## 2021-06-07 PROCEDURE — 3008F BODY MASS INDEX DOCD: CPT | Performed by: FAMILY MEDICINE

## 2021-06-07 PROCEDURE — 3079F DIAST BP 80-89 MM HG: CPT | Performed by: FAMILY MEDICINE

## 2021-06-07 NOTE — PROGRESS NOTES
HPI/Subjective:   Patient ID: Selene Travis is a 78year old male. HPI  Patient presents with:  Head Injury: had head injury when moving, hit head against trailor , now when moving felt dizziness,  when bending will feel dizzy. Mild headache.     Histo encounter.       Meds This Visit:  Requested Prescriptions      No prescriptions requested or ordered in this encounter       Imaging & Referrals:  None

## 2021-06-16 ENCOUNTER — PROCEDURE (OUTPATIENT)
Dept: SURGERY | Facility: CLINIC | Age: 66
End: 2021-06-16
Payer: COMMERCIAL

## 2021-06-16 VITALS
HEART RATE: 84 BPM | SYSTOLIC BLOOD PRESSURE: 132 MMHG | DIASTOLIC BLOOD PRESSURE: 82 MMHG | BODY MASS INDEX: 36.31 KG/M2 | WEIGHT: 292 LBS | HEIGHT: 75 IN

## 2021-06-16 DIAGNOSIS — C67.0 MALIGNANT NEOPLASM OF TRIGONE OF URINARY BLADDER (HCC): Primary | ICD-10-CM

## 2021-06-16 DIAGNOSIS — Z87.448 HISTORY OF URETHRAL STRICTURE: ICD-10-CM

## 2021-06-16 DIAGNOSIS — R82.89 ABNORMAL URINE CYTOLOGY: ICD-10-CM

## 2021-06-16 DIAGNOSIS — Z87.442 HISTORY OF KIDNEY STONES: ICD-10-CM

## 2021-06-16 DIAGNOSIS — R35.1 NOCTURIA: ICD-10-CM

## 2021-06-16 DIAGNOSIS — M54.89 MIDLINE BACK PAIN, UNSPECIFIED BACK LOCATION, UNSPECIFIED CHRONICITY: ICD-10-CM

## 2021-06-16 DIAGNOSIS — Z12.5 PROSTATE CANCER SCREENING: ICD-10-CM

## 2021-06-16 DIAGNOSIS — N28.1 BILATERAL RENAL CYSTS: ICD-10-CM

## 2021-06-16 DIAGNOSIS — N40.1 BENIGN PROSTATIC HYPERPLASIA WITH URINARY FREQUENCY: ICD-10-CM

## 2021-06-16 DIAGNOSIS — R82.993 HYPERURICOSURIA: ICD-10-CM

## 2021-06-16 DIAGNOSIS — F17.200 CURRENT SMOKER: ICD-10-CM

## 2021-06-16 DIAGNOSIS — R39.12 WEAK URINARY STREAM: ICD-10-CM

## 2021-06-16 DIAGNOSIS — R35.0 BENIGN PROSTATIC HYPERPLASIA WITH URINARY FREQUENCY: ICD-10-CM

## 2021-06-16 PROCEDURE — 3008F BODY MASS INDEX DOCD: CPT | Performed by: UROLOGY

## 2021-06-16 PROCEDURE — 52000 CYSTOURETHROSCOPY: CPT | Performed by: UROLOGY

## 2021-06-16 PROCEDURE — 99214 OFFICE O/P EST MOD 30 MIN: CPT | Performed by: UROLOGY

## 2021-06-16 PROCEDURE — 3075F SYST BP GE 130 - 139MM HG: CPT | Performed by: UROLOGY

## 2021-06-16 PROCEDURE — 3079F DIAST BP 80-89 MM HG: CPT | Performed by: UROLOGY

## 2021-06-16 NOTE — PROGRESS NOTES
PREOPERATIVE DIAGNOSIS:      Bladder cancer of right side of trigone 09/13/2017; No recurrence since then        POSTOP DIAGNOSIS:               The same;  No tumors, stones, or CIS of the bladder or bladder neck    PROCEDURE:              Cystoscopy = Negative for high-grade urothelial carcinoma .  Patient presently denies any associated symptoms to suggest advanced or recurrent or metastatic bladder cancer such as unintentional weight loss, loss of appetite, gross hematuria, or pelvic pain or discomfo associated flank pain, dysuria, gross hematuria, passage of kidney stones, or kidney stone attacks.  Patient feels this is stable as he is currently asymptomatic.      History of Urethral stricture   09/13/2017 cystoscopy with removal of bladder tumor and l [place]; moderate BPH obstruction; moderate high riding median bar at the bladder neck; mild lateral lobe enlargement; bladder 1-2+ trabeculated; bladder tumor appeared  frond-like; there appeared to be a 1.3 cm oval radiolucent filling defect at the left u BID   12/16/2020 Office cystoscopy; no recurrence; stop daily alfuzosin; start tamsulosin 0.4 mg twice daily; continue allopurinal 100 mg twice daily; decrease nocturia by gargling water at night when mouth is dry.    05/21/2021 Dr. Sheila Lucero, medical onco urine cytology = negative for high-grade urothelial carcinoma   01/20/2020 PSA = 0.90   6/10/2019 urine cytology = negative for high-grade urothelial carcinoma  4/15/2019 ISTAT Creatinine = 0.7; GFR > 60  01/30/2019 Urine Cytology = Benign, inflammatory, r unchanged; Normal kidneys     02/06/2018 XR Abdomen = No obvious renal calculi      10/27/2017 CT of the Abdomen and Pelvis without contrast = previously noted obstructing 1.3 cm left ureteropelvic junction calculus has resolved; there is mild persistent l lumbar spine. Degenerative changes are seen within the sacroiliac joints. Degenerative changes are seen in the bilateral hips. Bone island in the right pedicle of L5. CONCLUSION: 1.3 cm left renal pelvis calculus with severe left hydronephrosis.   Hepatic s elevated (250-750). Recent metabolic 24 hr urine collection not completed. He is currently taking allopurinol 100 mg BID; denies any side effects.  Patient feels this is stable and chooses to continue medication as prescribed.      (N40.1,  R35.0) Benign pr dihydrate and 90% uric acid. 05/19/2021 CT CHEST ABDOMEN PELVIS (ALL CONTRAST ONLY) = no stones or hydronephrosis. Patient continues to follow kidney stone prevention diet including drinking lemon water daily and restricting salt intake in his diet.  He is scans December 2020 as well as May 2021 show no evidence of any kidney stones    5. Continue to limit sodium and salt in your diet    6. Continue to drink lots of lemonade and large amounts of water to help prevent kidney stones    7.   Continue to use ei in this documentation. All medical record entries made by the scribe were at my direction and in my presence.   I have reviewed the chart and discharge instructions (if applicable) and agree that the record reflects my personal performance and is accurate a

## 2021-06-16 NOTE — PATIENT INSTRUCTIONS
Sandrine Waters M.D.    1.  No recurrence of bladder cancer today    2. Continue tamsulosin 0.4 mg twice daily    3. Continue allopurinol 100 mg twice daily    4.   CT scans December 2020 as well as May 2021 show no

## 2021-07-13 ENCOUNTER — OFFICE VISIT (OUTPATIENT)
Dept: OTOLARYNGOLOGY | Facility: CLINIC | Age: 66
End: 2021-07-13
Payer: COMMERCIAL

## 2021-07-13 VITALS
HEIGHT: 75 IN | WEIGHT: 290 LBS | BODY MASS INDEX: 36.06 KG/M2 | SYSTOLIC BLOOD PRESSURE: 137 MMHG | TEMPERATURE: 97 F | DIASTOLIC BLOOD PRESSURE: 84 MMHG

## 2021-07-13 DIAGNOSIS — H81.12 BENIGN PAROXYSMAL POSITIONAL VERTIGO OF LEFT EAR: ICD-10-CM

## 2021-07-13 DIAGNOSIS — H60.332 ACUTE SWIMMER'S EAR OF LEFT SIDE: Primary | ICD-10-CM

## 2021-07-13 PROCEDURE — 3075F SYST BP GE 130 - 139MM HG: CPT | Performed by: OTOLARYNGOLOGY

## 2021-07-13 PROCEDURE — 3079F DIAST BP 80-89 MM HG: CPT | Performed by: OTOLARYNGOLOGY

## 2021-07-13 PROCEDURE — 3008F BODY MASS INDEX DOCD: CPT | Performed by: OTOLARYNGOLOGY

## 2021-07-13 PROCEDURE — 99213 OFFICE O/P EST LOW 20 MIN: CPT | Performed by: OTOLARYNGOLOGY

## 2021-07-13 RX ORDER — OFLOXACIN 3 MG/ML
4 SOLUTION AURICULAR (OTIC) 2 TIMES DAILY
Qty: 1 EACH | Refills: 0 | Status: SHIPPED | OUTPATIENT
Start: 2021-07-13 | End: 2021-07-20

## 2021-07-13 NOTE — PROGRESS NOTES
Fabby Blake is a 78year old male. Patient presents with:  Ear Problem: c/o clogged ears for 6 weeks    HPI:   Has been experiencing problems with pain in his left ear and a feeling of fullness and blockage.   He is also been experiencing problems with d comment: occasional cigar    Vaping Use      Vaping Use: Never used    Alcohol use:  Yes      Alcohol/week: 2.0 standard drinks      Types: 2 Glasses of wine per week      Comment: occasionally    Drug use: No       REVIEW OF SYSTEMS:   GENERAL HEALTH: feel exercises. If symptoms persist may need physical therapy      The patient indicates understanding of these issues and agrees to the plan. Salil V. Leocadia Mcburney, MD  7/13/2021  4:35 PM

## 2021-07-19 RX ORDER — TAMSULOSIN HYDROCHLORIDE 0.4 MG/1
0.8 CAPSULE ORAL DAILY
Qty: 180 CAPSULE | Refills: 3 | Status: SHIPPED | OUTPATIENT
Start: 2021-07-19 | End: 2022-05-17

## 2021-07-23 RX ORDER — FLUTICASONE PROPIONATE 50 MCG
SPRAY, SUSPENSION (ML) NASAL
Qty: 48 G | Refills: 0 | Status: SHIPPED | OUTPATIENT
Start: 2021-07-23 | End: 2022-01-31 | Stop reason: ALTCHOICE

## 2021-08-31 RX ORDER — ALLOPURINOL 100 MG/1
TABLET ORAL
Qty: 180 TABLET | Refills: 3 | Status: SHIPPED | OUTPATIENT
Start: 2021-08-31

## 2021-08-31 NOTE — TELEPHONE ENCOUNTER
This RN approved the refill request of Alluporinol. This patient was last seen by Dr Amelie Crow on 6/16/21.  See office notes below:

## 2021-09-27 ENCOUNTER — TELEPHONE (OUTPATIENT)
Dept: OTOLARYNGOLOGY | Facility: CLINIC | Age: 66
End: 2021-09-27

## 2021-09-27 RX ORDER — MONTELUKAST SODIUM 10 MG/1
10 TABLET ORAL NIGHTLY
Qty: 90 TABLET | Refills: 1 | Status: SHIPPED | OUTPATIENT
Start: 2021-09-27 | End: 2022-01-25

## 2021-09-27 NOTE — TELEPHONE ENCOUNTER
Current Outpatient Medications:   •  •  MONTELUKAST SODIUM 10 MG Oral Tab, TAKE 1 TABLET BY MOUTH AT  NIGHT, Disp: 90 tablet, Rfl: 1  •

## 2021-10-19 ENCOUNTER — LAB ENCOUNTER (OUTPATIENT)
Dept: LAB | Facility: HOSPITAL | Age: 66
End: 2021-10-19
Attending: INTERNAL MEDICINE
Payer: MEDICARE

## 2021-10-19 DIAGNOSIS — C83.00 SMALL LYMPHOCYTIC LYMPHOMA (HCC): ICD-10-CM

## 2021-10-19 DIAGNOSIS — C67.9 MALIGNANT NEOPLASM OF URINARY BLADDER, UNSPECIFIED SITE (HCC): ICD-10-CM

## 2021-10-19 DIAGNOSIS — R91.1 PULMONARY NODULE: ICD-10-CM

## 2021-10-19 PROCEDURE — 83615 LACTATE (LD) (LDH) ENZYME: CPT

## 2021-10-19 PROCEDURE — 85025 COMPLETE CBC W/AUTO DIFF WBC: CPT

## 2021-10-19 PROCEDURE — 36415 COLL VENOUS BLD VENIPUNCTURE: CPT

## 2021-10-19 PROCEDURE — 80053 COMPREHEN METABOLIC PANEL: CPT

## 2021-10-21 ENCOUNTER — OFFICE VISIT (OUTPATIENT)
Dept: HEMATOLOGY/ONCOLOGY | Facility: HOSPITAL | Age: 66
End: 2021-10-21
Attending: INTERNAL MEDICINE
Payer: MEDICARE

## 2021-10-21 VITALS
SYSTOLIC BLOOD PRESSURE: 140 MMHG | RESPIRATION RATE: 18 BRPM | DIASTOLIC BLOOD PRESSURE: 85 MMHG | TEMPERATURE: 99 F | BODY MASS INDEX: 35.93 KG/M2 | HEART RATE: 82 BPM | WEIGHT: 289 LBS | OXYGEN SATURATION: 95 % | HEIGHT: 75 IN

## 2021-10-21 DIAGNOSIS — C67.9 MALIGNANT NEOPLASM OF URINARY BLADDER, UNSPECIFIED SITE (HCC): ICD-10-CM

## 2021-10-21 DIAGNOSIS — R91.1 PULMONARY NODULE: ICD-10-CM

## 2021-10-21 DIAGNOSIS — C83.00 SMALL LYMPHOCYTIC LYMPHOMA (HCC): Primary | ICD-10-CM

## 2021-10-21 PROCEDURE — 99214 OFFICE O/P EST MOD 30 MIN: CPT | Performed by: INTERNAL MEDICINE

## 2021-10-21 NOTE — PROGRESS NOTES
Cancer Center Progress Note    Patient Name: Fabby Blake   YOB: 1955   Medical Record Number: [id number]   Attending Physician: Elvis Carlos M.D.      Chief Complaint:   Small lymphocytic lymphoma    History of Present Illness:  79-year-old 2014    right   • Hearing impairment     left ear, hearing aid   • Hypercholesterolemia 9/10/2020   • Kidney stone    • Osteoarthritis    • Sleep apnea     never finished testing - no device at this time   • Urethral stricture    • Visual impairment     gl Medications:    Current Outpatient Medications:   •  montelukast 10 MG Oral Tab, Take 1 tablet (10 mg total) by mouth nightly., Disp: 90 tablet, Rfl: 1  •  ALLOPURINOL 100 MG Oral Tab, TAKE 1 TABLET BY MOUTH  TWICE DAILY, Disp: 180 tablet, Rfl: 3  •  FLUTI 36.12 kg/m²     Physical Examination:  General: Patient is alert and oriented x 3, not in acute distress. Psych:  Mood and affect appropriate  HEENT: EOMs intact. PERRL. Oropharynx is clear. Neck: No JVD. No palpable lymphadenopathy. Neck is supple.   Mo Nam 1/30/20 abdomen/pelvis CT.  Findings could represent changes of sarcoidosis or other lymphoproliferative disease. 4.  Hepatomegaly.  Enhancement segment 4 of the liver which may related to transient perfusional variation or fatty infiltration.    5. Mehnaz Members

## 2021-11-01 ENCOUNTER — OFFICE VISIT (OUTPATIENT)
Dept: FAMILY MEDICINE CLINIC | Facility: CLINIC | Age: 66
End: 2021-11-01
Payer: COMMERCIAL

## 2021-11-01 VITALS
WEIGHT: 293 LBS | HEART RATE: 69 BPM | BODY MASS INDEX: 36.43 KG/M2 | SYSTOLIC BLOOD PRESSURE: 148 MMHG | DIASTOLIC BLOOD PRESSURE: 80 MMHG | HEIGHT: 75 IN

## 2021-11-01 DIAGNOSIS — E66.01 SEVERE OBESITY (BMI 35.0-39.9) WITH COMORBIDITY (HCC): ICD-10-CM

## 2021-11-01 DIAGNOSIS — J34.2 NASAL SEPTAL DEVIATION: ICD-10-CM

## 2021-11-01 DIAGNOSIS — M51.26 HERNIATED NUCLEUS PULPOSUS, L4-5 LEFT: ICD-10-CM

## 2021-11-01 DIAGNOSIS — N40.1 BENIGN PROSTATIC HYPERPLASIA WITH NOCTURIA: ICD-10-CM

## 2021-11-01 DIAGNOSIS — R59.0 ABDOMINAL LYMPHADENOPATHY: ICD-10-CM

## 2021-11-01 DIAGNOSIS — M25.512 CHRONIC LEFT SHOULDER PAIN: ICD-10-CM

## 2021-11-01 DIAGNOSIS — R31.29 MICROHEMATURIA: ICD-10-CM

## 2021-11-01 DIAGNOSIS — G89.29 CHRONIC BILATERAL LOW BACK PAIN WITHOUT SCIATICA: ICD-10-CM

## 2021-11-01 DIAGNOSIS — K21.9 GASTROESOPHAGEAL REFLUX DISEASE WITHOUT ESOPHAGITIS: ICD-10-CM

## 2021-11-01 DIAGNOSIS — H90.42 SENSORINEURAL HEARING LOSS (SNHL) OF LEFT EAR WITH UNRESTRICTED HEARING OF RIGHT EAR: ICD-10-CM

## 2021-11-01 DIAGNOSIS — N20.0 NEPHROLITHIASIS: ICD-10-CM

## 2021-11-01 DIAGNOSIS — R35.1 BENIGN PROSTATIC HYPERPLASIA WITH NOCTURIA: ICD-10-CM

## 2021-11-01 DIAGNOSIS — Z00.00 ENCOUNTER FOR ANNUAL HEALTH EXAMINATION: ICD-10-CM

## 2021-11-01 DIAGNOSIS — M47.817 LUMBOSACRAL SPONDYLOSIS WITHOUT MYELOPATHY: Primary | ICD-10-CM

## 2021-11-01 DIAGNOSIS — E78.00 HYPERCHOLESTEROLEMIA: ICD-10-CM

## 2021-11-01 DIAGNOSIS — G89.29 CHRONIC LEFT SHOULDER PAIN: ICD-10-CM

## 2021-11-01 DIAGNOSIS — C67.0 MALIGNANT NEOPLASM OF TRIGONE OF URINARY BLADDER (HCC): ICD-10-CM

## 2021-11-01 DIAGNOSIS — N13.30 HYDRONEPHROSIS, LEFT: ICD-10-CM

## 2021-11-01 DIAGNOSIS — M54.50 CHRONIC BILATERAL LOW BACK PAIN WITHOUT SCIATICA: ICD-10-CM

## 2021-11-01 DIAGNOSIS — M1A.09X0 IDIOPATHIC CHRONIC GOUT OF MULTIPLE SITES WITHOUT TOPHUS: ICD-10-CM

## 2021-11-01 PROBLEM — M19.90 OSTEOARTHRITIS: Status: RESOLVED | Noted: 2020-09-10 | Resolved: 2021-11-01

## 2021-11-01 PROCEDURE — 96160 PT-FOCUSED HLTH RISK ASSMT: CPT | Performed by: FAMILY MEDICINE

## 2021-11-01 PROCEDURE — G0439 PPPS, SUBSEQ VISIT: HCPCS | Performed by: FAMILY MEDICINE

## 2021-11-01 PROCEDURE — 99397 PER PM REEVAL EST PAT 65+ YR: CPT | Performed by: FAMILY MEDICINE

## 2021-11-01 PROCEDURE — 3079F DIAST BP 80-89 MM HG: CPT | Performed by: FAMILY MEDICINE

## 2021-11-01 PROCEDURE — 3077F SYST BP >= 140 MM HG: CPT | Performed by: FAMILY MEDICINE

## 2021-11-01 PROCEDURE — 3008F BODY MASS INDEX DOCD: CPT | Performed by: FAMILY MEDICINE

## 2021-11-01 NOTE — PATIENT INSTRUCTIONS
Aimee Zheng's SCREENING SCHEDULE   Tests on this list are recommended by your physician but may not be covered, or covered at this frequency, by your insurer. Please check with your insurance carrier before scheduling to verify coverage.    BRISSA Pneumococcal Each vaccine ([id number] & [id number]) covered once after 65 Prevnar 13: 10/24/2019    [id number]: 09/10/2020     No recommendations at this time    Hepatitis B One screening covered for patients with certain risk factors   -  No recommend

## 2021-11-01 NOTE — PROGRESS NOTES
HPI:   Marquis Daniels is a 78year old male who presents for a Medicare Subsequent Annual Wellness visit (Pt already had Initial Annual Wellness). No topic due editable text        He has been screened for Falls and is low risk.     Cognitive Assessm Nasal septal deviation     Hypercholesterolemia     Sensorineural hearing loss (SNHL) of left ear with unrestricted hearing of right ear     Gastroesophageal reflux disease     Abdominal lymphadenopathy     Chronic bilateral low back pain without sciatica Hypercholesterolemia (9/10/2020), Kidney stone, Osteoarthritis, Sleep apnea, Urethral stricture, and Visual impairment. He  has a past surgical history that includes colonoscopy (07/11/2014); knee arthroscopy (Left, 2007);  Inguinal hernia repair (Right, as of this encounter: 293 lb (132.9 kg).     Medicare Hearing Assessment  (Required for AWV/SWV)    Hearing Screening    Time taken: 11/1/2021 12:02 PM  Entry User: Fer Velásquez CMA  Screening Method: Questionnaire  I have a problem hearing over the tele are normal.      Conjunctiva/sclera: Conjunctivae normal.      Pupils: Pupils are equal, round, and reactive to light. Funduscopic exam:     Right eye: No hemorrhage or papilledema. Left eye: No hemorrhage or papilledema.    Neck:      Vascular which helped  Severe obesity (BMI 35.0-39. 9) with comorbidity ([address]  Encouraged to eat well and exercise. Herniated nucleus pulposus, L4-5 left  Stable. No complaint  Chronic bilateral low back pain without sciatica  Stable . No complaint.    Abdominal lym recommended by your physician but may not be covered, or covered at this frequency, by your insurer. Please check with your insurance carrier before scheduling to verify coverage.    PREVENTATIVE SERVICES FREQUENCY &  COVERAGE DETAILS LAST COMPLETION DATE once after 65 Prevnar 13: 10/24/2019    [id number]: 09/10/2020     No recommendations at this time    Hepatitis B One screening covered for patients with certain risk factors   -  No recommendations at this time    Tetanus Toxoid Not covered by Medicare P

## 2021-11-22 ENCOUNTER — HOSPITAL ENCOUNTER (OUTPATIENT)
Dept: GENERAL RADIOLOGY | Facility: HOSPITAL | Age: 66
Discharge: HOME | End: 2021-11-22
Attending: ORTHOPAEDIC SURGERY
Payer: MEDICARE

## 2021-11-22 ENCOUNTER — OFFICE VISIT (OUTPATIENT)
Dept: ORTHOPEDICS CLINIC | Facility: CLINIC | Age: 66
End: 2021-11-22
Payer: COMMERCIAL

## 2021-11-22 ENCOUNTER — LAB ENCOUNTER (OUTPATIENT)
Dept: LAB | Facility: HOSPITAL | Age: 66
End: 2021-11-22
Attending: FAMILY MEDICINE
Payer: MEDICARE

## 2021-11-22 VITALS
HEIGHT: 75 IN | WEIGHT: 290 LBS | SYSTOLIC BLOOD PRESSURE: 145 MMHG | HEART RATE: 67 BPM | BODY MASS INDEX: 36.06 KG/M2 | DIASTOLIC BLOOD PRESSURE: 89 MMHG

## 2021-11-22 DIAGNOSIS — C67.9 MALIGNANT NEOPLASM OF URINARY BLADDER, UNSPECIFIED SITE (HCC): ICD-10-CM

## 2021-11-22 DIAGNOSIS — G89.29 CHRONIC PAIN OF BOTH SHOULDERS: ICD-10-CM

## 2021-11-22 DIAGNOSIS — C83.00 SMALL LYMPHOCYTIC LYMPHOMA (HCC): ICD-10-CM

## 2021-11-22 DIAGNOSIS — M25.511 CHRONIC PAIN OF BOTH SHOULDERS: ICD-10-CM

## 2021-11-22 DIAGNOSIS — M1A.09X0 IDIOPATHIC CHRONIC GOUT OF MULTIPLE SITES WITHOUT TOPHUS: ICD-10-CM

## 2021-11-22 DIAGNOSIS — M25.512 CHRONIC PAIN OF BOTH SHOULDERS: ICD-10-CM

## 2021-11-22 DIAGNOSIS — M19.111 POST-TRAUMATIC OSTEOARTHRITIS OF BOTH SHOULDERS: Primary | ICD-10-CM

## 2021-11-22 DIAGNOSIS — M19.112 POST-TRAUMATIC OSTEOARTHRITIS OF BOTH SHOULDERS: Primary | ICD-10-CM

## 2021-11-22 DIAGNOSIS — E78.00 HYPERCHOLESTEROLEMIA: ICD-10-CM

## 2021-11-22 PROCEDURE — 73030 X-RAY EXAM OF SHOULDER: CPT | Performed by: ORTHOPAEDIC SURGERY

## 2021-11-22 PROCEDURE — 36415 COLL VENOUS BLD VENIPUNCTURE: CPT

## 2021-11-22 PROCEDURE — 84550 ASSAY OF BLOOD/URIC ACID: CPT

## 2021-11-22 PROCEDURE — 80061 LIPID PANEL: CPT

## 2021-11-22 PROCEDURE — 3077F SYST BP >= 140 MM HG: CPT | Performed by: ORTHOPAEDIC SURGERY

## 2021-11-22 PROCEDURE — 85025 COMPLETE CBC W/AUTO DIFF WBC: CPT

## 2021-11-22 PROCEDURE — 20610 DRAIN/INJ JOINT/BURSA W/O US: CPT | Performed by: ORTHOPAEDIC SURGERY

## 2021-11-22 PROCEDURE — 3008F BODY MASS INDEX DOCD: CPT | Performed by: ORTHOPAEDIC SURGERY

## 2021-11-22 PROCEDURE — 83615 LACTATE (LD) (LDH) ENZYME: CPT

## 2021-11-22 PROCEDURE — 80053 COMPREHEN METABOLIC PANEL: CPT

## 2021-11-22 PROCEDURE — 3079F DIAST BP 80-89 MM HG: CPT | Performed by: ORTHOPAEDIC SURGERY

## 2021-11-22 RX ORDER — TRIAMCINOLONE ACETONIDE 40 MG/ML
40 INJECTION, SUSPENSION INTRA-ARTICULAR; INTRAMUSCULAR ONCE
Status: COMPLETED | OUTPATIENT
Start: 2021-11-22 | End: 2021-11-22

## 2021-11-22 RX ADMIN — TRIAMCINOLONE ACETONIDE 40 MG: 40 INJECTION, SUSPENSION INTRA-ARTICULAR; INTRAMUSCULAR at 12:03:00

## 2021-11-22 RX ADMIN — TRIAMCINOLONE ACETONIDE 40 MG: 40 INJECTION, SUSPENSION INTRA-ARTICULAR; INTRAMUSCULAR at 12:02:00

## 2021-11-22 NOTE — PROCEDURES
Patient identified left and right shoulder as correct procedure site, this was verified with consent and 2 patient identifiers. A timeout was performed. Posterior inferior acromion skin was prepped with Betadine and alcohol.  Injection site was anesthetized

## 2021-11-22 NOTE — PROCEDURES
Per Dr. Eliana Alexis verbal instruction, draw up 1mL Kenalog-40 and 4mL Xylocaine 1% (x2) for bilateral shoulder injection. Pt provided w/ education handout for cortisone injection.

## 2021-11-22 NOTE — H&P
NURSING INTAKE COMMENTS: Patient presents with:  Shoulder Pain: Pt here w/ c/o bilateral shoulder pain, L>R pain, hx multiple bilateral disclocations.  reports numbness of left arm while sleeping, pain 5/10      HPI: This 78year old male presents today for TURBINATE,SUBMUCOUS     • INGUINAL HERNIA REPAIR Right 1999   • KNEE ARTHROSCOPY Left 2007   • KNEE SURGERY Right    • LIPOMA REMOVAL Bilateral 08/01/2014    [facility] [place]   • LIPOMA REMOVAL Left 2019    forearm   • LITHOTRIPSY  09/2017   • OTHER S weight loss or weight gain  SKIN: denies worrisome skin lesions  EYES: denies blurred vision or double vision  HEENT: denies new nasal congestion  PULM: denies shortness of breath or cough  CARDIOVASCULAR: denies chest pain  GI: denies emesis, no diarrhea, 11/22/2021 at 2:19 PM     Finalized by (CST): Santy Quarles MD on 11/22/2021 at 2:20 PM          XR SHOULDER, COMPLETE (MIN 2 VIEWS), RIGHT (CPT=73030)    Result Date: 11/22/2021  PROCEDURE: XR SHOULDER, COMPLETE (MIN 2 VIEWS), RIGHT (CPT=73030) exercises for her shoulders and would like bilateral shoulder corticosteroid injections after we discussed risks and benefits. He is also having some chronic knee pain will follow up at another appointment for his knee.     The above note was creating usin

## 2021-11-23 ENCOUNTER — TELEPHONE (OUTPATIENT)
Dept: OTOLARYNGOLOGY | Facility: CLINIC | Age: 66
End: 2021-11-23

## 2021-11-30 RX ORDER — FLUTICASONE PROPIONATE 50 MCG
2 SPRAY, SUSPENSION (ML) NASAL DAILY
Qty: 16 G | Refills: 3 | Status: SHIPPED | OUTPATIENT
Start: 2021-11-30

## 2021-12-10 ENCOUNTER — HOSPITAL ENCOUNTER (OUTPATIENT)
Dept: GENERAL RADIOLOGY | Facility: HOSPITAL | Age: 66
Discharge: HOME | End: 2021-12-10
Attending: ORTHOPAEDIC SURGERY
Payer: MEDICARE

## 2021-12-10 ENCOUNTER — OFFICE VISIT (OUTPATIENT)
Dept: ORTHOPEDICS CLINIC | Facility: CLINIC | Age: 66
End: 2021-12-10
Payer: COMMERCIAL

## 2021-12-10 DIAGNOSIS — M17.0 PRIMARY OSTEOARTHRITIS OF BOTH KNEES: Primary | ICD-10-CM

## 2021-12-10 DIAGNOSIS — M25.561 PAIN IN BOTH KNEES, UNSPECIFIED CHRONICITY: ICD-10-CM

## 2021-12-10 DIAGNOSIS — M25.562 PAIN IN BOTH KNEES, UNSPECIFIED CHRONICITY: ICD-10-CM

## 2021-12-10 PROCEDURE — 73562 X-RAY EXAM OF KNEE 3: CPT | Performed by: ORTHOPAEDIC SURGERY

## 2021-12-10 PROCEDURE — 99213 OFFICE O/P EST LOW 20 MIN: CPT | Performed by: ORTHOPAEDIC SURGERY
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
## 2022-01-18 DIAGNOSIS — C83.00 SMALL LYMPHOCYTIC LYMPHOMA (HCC): Primary | ICD-10-CM

## 2022-01-24 RX ORDER — OMEPRAZOLE 40 MG/1
CAPSULE, DELAYED RELEASE ORAL
Qty: 90 CAPSULE | Refills: 3 | Status: SHIPPED | OUTPATIENT
Start: 2022-01-24

## 2022-01-24 RX ORDER — MELOXICAM 15 MG/1
TABLET ORAL
Qty: 90 TABLET | Refills: 3 | Status: SHIPPED | OUTPATIENT
Start: 2022-01-24

## 2022-01-24 NOTE — TELEPHONE ENCOUNTER
Requested Prescriptions     Pending Prescriptions Disp Refills   • OMEPRAZOLE 40 MG Oral Capsule Delayed Release [Pharmacy Med Name: Omeprazole 40 MG Oral Capsule Delayed Release] 90 capsule 3     Sig: TAKE 1 CAPSULE BY MOUTH  DAILY BEFORE BREAKFAST

## 2022-01-25 RX ORDER — MONTELUKAST SODIUM 10 MG/1
TABLET ORAL
Qty: 90 TABLET | Refills: 3 | Status: SHIPPED | OUTPATIENT
Start: 2022-01-25

## 2022-01-31 ENCOUNTER — OFFICE VISIT (OUTPATIENT)
Dept: OTOLARYNGOLOGY | Facility: CLINIC | Age: 67
End: 2022-01-31
Payer: COMMERCIAL

## 2022-01-31 VITALS — WEIGHT: 290 LBS | HEIGHT: 75 IN | BODY MASS INDEX: 36.06 KG/M2

## 2022-01-31 DIAGNOSIS — R42 DIZZINESS: Primary | ICD-10-CM

## 2022-01-31 DIAGNOSIS — J34.89 NASAL OBSTRUCTION: ICD-10-CM

## 2022-01-31 DIAGNOSIS — G47.33 OSA (OBSTRUCTIVE SLEEP APNEA): ICD-10-CM

## 2022-01-31 PROCEDURE — 3008F BODY MASS INDEX DOCD: CPT | Performed by: OTOLARYNGOLOGY

## 2022-01-31 PROCEDURE — 99214 OFFICE O/P EST MOD 30 MIN: CPT | Performed by: OTOLARYNGOLOGY
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
## 2022-02-02 ENCOUNTER — ORDER TRANSCRIPTION (OUTPATIENT)
Dept: ADMINISTRATIVE | Facility: HOSPITAL | Age: 67
End: 2022-02-02

## 2022-02-04 ENCOUNTER — LAB ENCOUNTER (OUTPATIENT)
Dept: LAB | Facility: HOSPITAL | Age: 67
End: 2022-02-04
Attending: PHYSICAL MEDICINE & REHABILITATION
Payer: MEDICARE

## 2022-02-04 DIAGNOSIS — M54.50 CHRONIC BILATERAL LOW BACK PAIN WITHOUT SCIATICA: ICD-10-CM

## 2022-02-04 DIAGNOSIS — G89.29 CHRONIC BILATERAL LOW BACK PAIN WITHOUT SCIATICA: ICD-10-CM

## 2022-02-04 DIAGNOSIS — Z01.818 PREOP EXAMINATION: ICD-10-CM

## 2022-02-04 DIAGNOSIS — Z11.59 SCREENING FOR VIRAL DISEASE: ICD-10-CM

## 2022-02-04 LAB — SARS-COV-2 RNA RESP QL NAA+PROBE: NOT DETECTED

## 2022-02-18 ENCOUNTER — LAB ENCOUNTER (OUTPATIENT)
Dept: LAB | Facility: HOSPITAL | Age: 67
End: 2022-02-18
Attending: INTERNAL MEDICINE
Payer: MEDICARE

## 2022-02-18 DIAGNOSIS — C83.00 SMALL LYMPHOCYTIC LYMPHOMA (HCC): ICD-10-CM

## 2022-02-18 LAB
ALBUMIN SERPL-MCNC: 4.1 G/DL (ref 3.4–5)
ALBUMIN/GLOB SERPL: 1.2 {RATIO} (ref 1–2)
ALP LIVER SERPL-CCNC: 70 U/L
ALT SERPL-CCNC: 55 U/L
ANION GAP SERPL CALC-SCNC: 6 MMOL/L (ref 0–18)
AST SERPL-CCNC: 28 U/L (ref 15–37)
BASOPHILS # BLD AUTO: 0.08 X10(3) UL (ref 0–0.2)
BASOPHILS NFR BLD AUTO: 0.8 %
BILIRUB SERPL-MCNC: 0.7 MG/DL (ref 0.1–2)
BUN BLD-MCNC: 16 MG/DL (ref 7–18)
BUN/CREAT SERPL: 19 (ref 10–20)
CALCIUM BLD-MCNC: 9.1 MG/DL (ref 8.5–10.1)
CHLORIDE SERPL-SCNC: 104 MMOL/L (ref 98–112)
CO2 SERPL-SCNC: 30 MMOL/L (ref 21–32)
CREAT BLD-MCNC: 0.84 MG/DL
DEPRECATED RDW RBC AUTO: 47.7 FL (ref 35.1–46.3)
EOSINOPHIL # BLD AUTO: 0.15 X10(3) UL (ref 0–0.7)
EOSINOPHIL NFR BLD AUTO: 1.5 %
ERYTHROCYTE [DISTWIDTH] IN BLOOD BY AUTOMATED COUNT: 12.9 % (ref 11–15)
FASTING STATUS PATIENT QL REPORTED: YES
GLOBULIN PLAS-MCNC: 3.4 G/DL (ref 2.8–4.4)
GLUCOSE BLD-MCNC: 90 MG/DL (ref 70–99)
HCT VFR BLD AUTO: 51.3 %
HGB BLD-MCNC: 17.1 G/DL
IMM GRANULOCYTES # BLD AUTO: 0.05 X10(3) UL (ref 0–1)
IMM GRANULOCYTES NFR BLD: 0.5 %
LDH SERPL L TO P-CCNC: 200 U/L
LYMPHOCYTES # BLD AUTO: 3.17 X10(3) UL (ref 1–4)
LYMPHOCYTES NFR BLD AUTO: 32.2 %
MCH RBC QN AUTO: 33.1 PG (ref 26–34)
MCHC RBC AUTO-ENTMCNC: 33.3 G/DL (ref 31–37)
MCV RBC AUTO: 99.2 FL
MONOCYTES # BLD AUTO: 0.86 X10(3) UL (ref 0.1–1)
MONOCYTES NFR BLD AUTO: 8.7 %
NEUTROPHILS # BLD AUTO: 5.54 X10 (3) UL (ref 1.5–7.7)
NEUTROPHILS # BLD AUTO: 5.54 X10(3) UL (ref 1.5–7.7)
NEUTROPHILS NFR BLD AUTO: 56.3 %
OSMOLALITY SERPL CALC.SUM OF ELEC: 291 MOSM/KG (ref 275–295)
PLATELET # BLD AUTO: 216 10(3)UL (ref 150–450)
POTASSIUM SERPL-SCNC: 4.6 MMOL/L (ref 3.5–5.1)
PROT SERPL-MCNC: 7.5 G/DL (ref 6.4–8.2)
RBC # BLD AUTO: 5.17 X10(6)UL
SODIUM SERPL-SCNC: 140 MMOL/L (ref 136–145)
WBC # BLD AUTO: 9.9 X10(3) UL (ref 4–11)

## 2022-02-18 PROCEDURE — 36415 COLL VENOUS BLD VENIPUNCTURE: CPT

## 2022-02-18 PROCEDURE — 83615 LACTATE (LD) (LDH) ENZYME: CPT

## 2022-02-18 PROCEDURE — 80053 COMPREHEN METABOLIC PANEL: CPT

## 2022-02-18 PROCEDURE — 85025 COMPLETE CBC W/AUTO DIFF WBC: CPT

## 2022-02-21 ENCOUNTER — OFFICE VISIT (OUTPATIENT)
Dept: HEMATOLOGY/ONCOLOGY | Facility: HOSPITAL | Age: 67
End: 2022-02-21
Attending: INTERNAL MEDICINE
Payer: MEDICARE

## 2022-02-21 VITALS
OXYGEN SATURATION: 96 % | DIASTOLIC BLOOD PRESSURE: 87 MMHG | HEIGHT: 75 IN | SYSTOLIC BLOOD PRESSURE: 153 MMHG | BODY MASS INDEX: 36.06 KG/M2 | WEIGHT: 290 LBS | HEART RATE: 101 BPM | TEMPERATURE: 98 F | RESPIRATION RATE: 18 BRPM

## 2022-02-21 DIAGNOSIS — R91.1 PULMONARY NODULE: ICD-10-CM

## 2022-02-21 DIAGNOSIS — C83.00 SMALL LYMPHOCYTIC LYMPHOMA (HCC): ICD-10-CM

## 2022-02-21 DIAGNOSIS — C67.9 MALIGNANT NEOPLASM OF URINARY BLADDER, UNSPECIFIED SITE (HCC): Primary | ICD-10-CM

## 2022-02-21 PROCEDURE — 99214 OFFICE O/P EST MOD 30 MIN: CPT | Performed by: INTERNAL MEDICINE

## 2022-03-07 ENCOUNTER — LAB ENCOUNTER (OUTPATIENT)
Dept: LAB | Facility: HOSPITAL | Age: 67
End: 2022-03-07
Attending: INTERNAL MEDICINE
Payer: MEDICARE

## 2022-03-07 DIAGNOSIS — R82.89 ABNORMAL URINE CYTOLOGY: ICD-10-CM

## 2022-03-07 PROCEDURE — 88108 CYTOPATH CONCENTRATE TECH: CPT

## 2022-03-08 LAB — NON GYNE INTERPRETATION: NEGATIVE

## 2022-03-09 ENCOUNTER — PROCEDURE (OUTPATIENT)
Dept: SURGERY | Facility: CLINIC | Age: 67
End: 2022-03-09
Payer: COMMERCIAL

## 2022-03-09 VITALS — SYSTOLIC BLOOD PRESSURE: 149 MMHG | DIASTOLIC BLOOD PRESSURE: 95 MMHG | HEART RATE: 82 BPM

## 2022-03-09 DIAGNOSIS — N40.1 BENIGN PROSTATIC HYPERPLASIA WITH URINARY FREQUENCY: ICD-10-CM

## 2022-03-09 DIAGNOSIS — R35.0 BENIGN PROSTATIC HYPERPLASIA WITH URINARY FREQUENCY: ICD-10-CM

## 2022-03-09 DIAGNOSIS — C67.0 MALIGNANT NEOPLASM OF TRIGONE OF URINARY BLADDER (HCC): ICD-10-CM

## 2022-03-09 DIAGNOSIS — R39.12 WEAK URINARY STREAM: ICD-10-CM

## 2022-03-09 DIAGNOSIS — F17.200 CURRENT SMOKER: ICD-10-CM

## 2022-03-09 DIAGNOSIS — Z87.448 HISTORY OF URETHRAL STRICTURE: ICD-10-CM

## 2022-03-09 DIAGNOSIS — R82.993 HYPERURICOSURIA: ICD-10-CM

## 2022-03-09 DIAGNOSIS — D49.4 BLADDER TUMOR: Primary | ICD-10-CM

## 2022-03-09 DIAGNOSIS — M54.89 MIDLINE BACK PAIN, UNSPECIFIED BACK LOCATION, UNSPECIFIED CHRONICITY: ICD-10-CM

## 2022-03-09 DIAGNOSIS — Z87.442 HISTORY OF KIDNEY STONES: ICD-10-CM

## 2022-03-09 DIAGNOSIS — Z12.5 PROSTATE CANCER SCREENING: ICD-10-CM

## 2022-03-09 DIAGNOSIS — N20.0 KIDNEY STONE: ICD-10-CM

## 2022-03-09 DIAGNOSIS — R35.1 NOCTURIA: ICD-10-CM

## 2022-03-09 DIAGNOSIS — N28.1 BILATERAL RENAL CYSTS: ICD-10-CM

## 2022-03-09 PROCEDURE — 3080F DIAST BP >= 90 MM HG: CPT | Performed by: UROLOGY

## 2022-03-09 PROCEDURE — 99214 OFFICE O/P EST MOD 30 MIN: CPT | Performed by: UROLOGY

## 2022-03-09 PROCEDURE — 3077F SYST BP >= 140 MM HG: CPT | Performed by: UROLOGY

## 2022-03-09 PROCEDURE — 52234 CYSTOSCOPY AND TREATMENT: CPT | Performed by: UROLOGY

## 2022-03-09 RX ORDER — CIPROFLOXACIN 500 MG/1
500 TABLET, FILM COATED ORAL ONCE
Status: SHIPPED | OUTPATIENT
Start: 2022-03-09

## 2022-05-17 RX ORDER — TAMSULOSIN HYDROCHLORIDE 0.4 MG/1
CAPSULE ORAL
Qty: 180 CAPSULE | Refills: 3 | Status: SHIPPED | OUTPATIENT
Start: 2022-05-17

## 2022-05-18 ENCOUNTER — TELEPHONE (OUTPATIENT)
Dept: FAMILY MEDICINE CLINIC | Facility: CLINIC | Age: 67
End: 2022-05-18

## 2022-05-18 NOTE — TELEPHONE ENCOUNTER
Patient stated would call back to schedule MA Super. Patient wants first all his cancer test done before booking.

## 2022-05-22 ENCOUNTER — LAB ENCOUNTER (OUTPATIENT)
Dept: LAB | Facility: HOSPITAL | Age: 67
End: 2022-05-22
Attending: INTERNAL MEDICINE
Payer: MEDICARE

## 2022-05-22 ENCOUNTER — HOSPITAL ENCOUNTER (OUTPATIENT)
Dept: CT IMAGING | Facility: HOSPITAL | Age: 67
End: 2022-05-22
Attending: INTERNAL MEDICINE
Payer: MEDICARE

## 2022-05-22 ENCOUNTER — HOSPITAL ENCOUNTER (OUTPATIENT)
Dept: CT IMAGING | Facility: HOSPITAL | Age: 67
Discharge: HOME | End: 2022-05-22
Attending: INTERNAL MEDICINE
Payer: MEDICARE

## 2022-05-22 DIAGNOSIS — R91.1 PULMONARY NODULE: ICD-10-CM

## 2022-05-22 DIAGNOSIS — C67.9 MALIGNANT NEOPLASM OF URINARY BLADDER, UNSPECIFIED SITE (HCC): ICD-10-CM

## 2022-05-22 DIAGNOSIS — C83.00 SMALL LYMPHOCYTIC LYMPHOMA (HCC): ICD-10-CM

## 2022-05-22 DIAGNOSIS — Z12.5 PROSTATE CANCER SCREENING: ICD-10-CM

## 2022-05-22 LAB
ALBUMIN SERPL-MCNC: 3.6 G/DL (ref 3.4–5)
ALBUMIN/GLOB SERPL: 1 {RATIO} (ref 1–2)
ALP LIVER SERPL-CCNC: 79 U/L
ALT SERPL-CCNC: 54 U/L
ANION GAP SERPL CALC-SCNC: 3 MMOL/L (ref 0–18)
AST SERPL-CCNC: 29 U/L (ref 15–37)
BASOPHILS # BLD AUTO: 0.07 X10(3) UL (ref 0–0.2)
BASOPHILS NFR BLD AUTO: 0.9 %
BILIRUB SERPL-MCNC: 0.5 MG/DL (ref 0.1–2)
BUN BLD-MCNC: 18 MG/DL (ref 7–18)
BUN/CREAT SERPL: 18.9 (ref 10–20)
CALCIUM BLD-MCNC: 8.9 MG/DL (ref 8.5–10.1)
CHLORIDE SERPL-SCNC: 106 MMOL/L (ref 98–112)
CO2 SERPL-SCNC: 31 MMOL/L (ref 21–32)
COMPLEXED PSA SERPL-MCNC: 1.13 NG/ML (ref ?–4)
CREAT BLD-MCNC: 0.95 MG/DL
CREAT BLD-MCNC: 1 MG/DL
DEPRECATED RDW RBC AUTO: 48.9 FL (ref 35.1–46.3)
EOSINOPHIL # BLD AUTO: 0.2 X10(3) UL (ref 0–0.7)
EOSINOPHIL NFR BLD AUTO: 2.6 %
ERYTHROCYTE [DISTWIDTH] IN BLOOD BY AUTOMATED COUNT: 13.5 % (ref 11–15)
FASTING STATUS PATIENT QL REPORTED: NO
GLOBULIN PLAS-MCNC: 3.6 G/DL (ref 2.8–4.4)
GLUCOSE BLD-MCNC: 105 MG/DL (ref 70–99)
HCT VFR BLD AUTO: 47.5 %
HGB BLD-MCNC: 15.9 G/DL
IMM GRANULOCYTES # BLD AUTO: 0.04 X10(3) UL (ref 0–1)
IMM GRANULOCYTES NFR BLD: 0.5 %
LDH SERPL L TO P-CCNC: 203 U/L
LYMPHOCYTES # BLD AUTO: 2.1 X10(3) UL (ref 1–4)
LYMPHOCYTES NFR BLD AUTO: 27.2 %
MCH RBC QN AUTO: 32.5 PG (ref 26–34)
MCHC RBC AUTO-ENTMCNC: 33.5 G/DL (ref 31–37)
MCV RBC AUTO: 97.1 FL
MONOCYTES # BLD AUTO: 0.69 X10(3) UL (ref 0.1–1)
MONOCYTES NFR BLD AUTO: 8.9 %
NEUTROPHILS # BLD AUTO: 4.62 X10 (3) UL (ref 1.5–7.7)
NEUTROPHILS # BLD AUTO: 4.62 X10(3) UL (ref 1.5–7.7)
NEUTROPHILS NFR BLD AUTO: 59.9 %
OSMOLALITY SERPL CALC.SUM OF ELEC: 292 MOSM/KG (ref 275–295)
PLATELET # BLD AUTO: 202 10(3)UL (ref 150–450)
POTASSIUM SERPL-SCNC: 4.3 MMOL/L (ref 3.5–5.1)
PROT SERPL-MCNC: 7.2 G/DL (ref 6.4–8.2)
RBC # BLD AUTO: 4.89 X10(6)UL
SODIUM SERPL-SCNC: 140 MMOL/L (ref 136–145)
WBC # BLD AUTO: 7.7 X10(3) UL (ref 4–11)

## 2022-05-22 PROCEDURE — 82565 ASSAY OF CREATININE: CPT

## 2022-05-22 PROCEDURE — 71260 CT THORAX DX C+: CPT | Performed by: INTERNAL MEDICINE

## 2022-05-22 PROCEDURE — 83615 LACTATE (LD) (LDH) ENZYME: CPT

## 2022-05-22 PROCEDURE — 80053 COMPREHEN METABOLIC PANEL: CPT

## 2022-05-22 PROCEDURE — 36415 COLL VENOUS BLD VENIPUNCTURE: CPT

## 2022-05-22 PROCEDURE — 85025 COMPLETE CBC W/AUTO DIFF WBC: CPT

## 2022-05-23 ENCOUNTER — TELEPHONE (OUTPATIENT)
Dept: HEMATOLOGY/ONCOLOGY | Facility: HOSPITAL | Age: 67
End: 2022-05-23

## 2022-05-23 ENCOUNTER — OFFICE VISIT (OUTPATIENT)
Dept: HEMATOLOGY/ONCOLOGY | Facility: HOSPITAL | Age: 67
End: 2022-05-23
Attending: INTERNAL MEDICINE
Payer: MEDICARE

## 2022-05-23 VITALS
HEART RATE: 96 BPM | HEIGHT: 75 IN | TEMPERATURE: 98 F | RESPIRATION RATE: 18 BRPM | SYSTOLIC BLOOD PRESSURE: 136 MMHG | BODY MASS INDEX: 37.92 KG/M2 | WEIGHT: 305 LBS | OXYGEN SATURATION: 96 % | DIASTOLIC BLOOD PRESSURE: 72 MMHG

## 2022-05-23 DIAGNOSIS — C67.9 MALIGNANT NEOPLASM OF URINARY BLADDER, UNSPECIFIED SITE (HCC): Primary | ICD-10-CM

## 2022-05-23 DIAGNOSIS — C83.00 SMALL LYMPHOCYTIC LYMPHOMA (HCC): ICD-10-CM

## 2022-05-23 DIAGNOSIS — R91.1 PULMONARY NODULE: ICD-10-CM

## 2022-05-23 PROCEDURE — 99214 OFFICE O/P EST MOD 30 MIN: CPT | Performed by: INTERNAL MEDICINE

## 2022-05-23 NOTE — TELEPHONE ENCOUNTER
CT scan reviewed by Dr Margo Haro and pt contacted to advise that Dr Margo Haro reviewed the scan and notes that pulmonary nodule that has been followed looks stable. Also, some minor changes in the lymph nodes in chest, abdomen but are not concerning for lymphoma. Likely reactive and normal changes. Pt asking about the pulmonary findings and Dr Margo Haro recommends following up with PCP, as scan indicates possibility of pulmonary hypertension could be related to sleep apnea, encouraged to f/u with Marcello. Patient appreciative of call and confirms understanding.

## 2022-05-24 ENCOUNTER — PATIENT MESSAGE (OUTPATIENT)
Dept: FAMILY MEDICINE CLINIC | Facility: CLINIC | Age: 67
End: 2022-05-24

## 2022-05-24 DIAGNOSIS — I27.20 PULMONARY HYPERTENSION (HCC): Primary | ICD-10-CM

## 2022-05-25 NOTE — TELEPHONE ENCOUNTER
From: Pro Canela  To: Nashuamarisol Mtz DO  Sent: 5/24/2022 5:54 PM CDT  Subject: Review of CAT Scan - Follow Up    HiDr. Eleanor ran my normal blood tests and a CAT. The CAT shows some possible issues with pulmonary Hypertension and seems to me that we should schedule an echocardiogram.  I still have light headedness from 12 months ago and the pains in my left chest that we discussed in the past.  Anyway, if you can please review the tests and determine whether we should do some follow up.   Thanks,  Matheus Tate

## 2022-06-08 ENCOUNTER — HOSPITAL ENCOUNTER (OUTPATIENT)
Dept: CV DIAGNOSTICS | Facility: HOSPITAL | Age: 67
Discharge: HOME | End: 2022-06-08
Attending: FAMILY MEDICINE
Payer: MEDICARE

## 2022-06-08 DIAGNOSIS — I27.20 PULMONARY HYPERTENSION (HCC): ICD-10-CM

## 2022-06-08 PROCEDURE — 93306 TTE W/DOPPLER COMPLETE: CPT | Performed by: FAMILY MEDICINE

## 2022-06-18 DIAGNOSIS — I27.20 PULMONARY HYPERTENSION (HCC): Primary | ICD-10-CM

## 2022-06-27 ENCOUNTER — PATIENT MESSAGE (OUTPATIENT)
Dept: FAMILY MEDICINE CLINIC | Facility: CLINIC | Age: 67
End: 2022-06-27

## 2022-06-27 NOTE — TELEPHONE ENCOUNTER
From: Meme Elmore  To: Beryle Greener, DO  Sent: 6/27/2022 4:29 PM CDT  Subject: Cardiologist    After my last test you said you would refer a cardiologist.  Please forward.

## 2022-07-21 DIAGNOSIS — C67.0 MALIGNANT NEOPLASM OF TRIGONE OF URINARY BLADDER (HCC): ICD-10-CM

## 2022-07-21 DIAGNOSIS — Z87.448 HISTORY OF URETHRAL STRICTURE: ICD-10-CM

## 2022-07-21 RX ORDER — CLOTRIMAZOLE 1 %
CREAM (GRAM) TOPICAL
COMMUNITY
Start: 2022-02-07

## 2022-07-21 RX ORDER — LOSARTAN POTASSIUM 50 MG/1
TABLET ORAL
COMMUNITY
Start: 2022-07-20

## 2022-07-25 RX ORDER — DIAZEPAM 5 MG/1
TABLET ORAL
Qty: 3 TABLET | Refills: 0 | Status: SHIPPED | OUTPATIENT
Start: 2022-07-25

## 2022-08-04 ENCOUNTER — PATIENT MESSAGE (OUTPATIENT)
Dept: SURGERY | Facility: CLINIC | Age: 67
End: 2022-08-04

## 2022-08-04 ENCOUNTER — TELEPHONE (OUTPATIENT)
Dept: SURGERY | Facility: CLINIC | Age: 67
End: 2022-08-04

## 2022-08-04 NOTE — TELEPHONE ENCOUNTER
PT sent below below my chart message. Canceled August 15th appointment. Future Appointments   Date Time Provider Jose Angel Ghada   8/29/2022 11:00 AM Yesi Alberto MD [facility]   10/14/2022 11:00 AM Ethel Hurst DO [facility]   10/17/2022 12:00 PM Digna Gooden MD [address] [place] [address] ONC EMO         The August 29 date works great. I'll bring GIN (his favorite). Much appreciated. He has been great these years and will miss him, but glad he's going to relax in [facility]. Manda August 29 date works great. I'll bring GIN (his favorite). Much appreciated. He has been great these years and will miss him, but glad he's going to relax in [facility].   Whit Delarosa

## 2022-08-04 NOTE — TELEPHONE ENCOUNTER
Patient sent a message that he is not able to make it to his cysto appointment scheduled for 8/15/22 please advise on next step

## 2022-08-04 NOTE — TELEPHONE ENCOUNTER
From: Amol Brown  To: Melissa Vega MD  Sent: 8/4/2022 12:48 PM CDT  Subject: Procedure - August 15    Sorry about the inconvenience but I need to reschedule the August 15th procedure.   can we reschedule to week of August 22?    sincerely,  Hortencia Vieyra  [phone]

## 2022-08-04 NOTE — TELEPHONE ENCOUNTER
Please call patient back. He needs to have a cystoscopy rescheduled. If you can get patient on my schedule for this month, I would be more than happy to perform his cystoscopy, with possible bladder biopsy. If you cannot accommodate him on my schedule before I retire in August 31, then you have to schedule him with either Dr. Lizbet Marti or Dr. Chayo Penny. The instructions in preparation for the cystoscopy would be EXACTLY the same as outlined in his most recent AVS that I prepared and generated.     Thank you,    Dr. Cassie Delgadillo

## 2022-08-04 NOTE — TELEPHONE ENCOUNTER
LMTCB, will also send patient [facility] message.      I tentatively scheduled patient for first available which is 8/29/22 at 11:00am.     Future Appointments   Date Time Provider Jose Angel Urrutia   8/15/2022  1:20 PM Sundeep Hastings MD [facility]   8/29/2022 11:00 AM Sundeep Hastings MD [facility] OF THE [facility]   10/14/2022 11:00 AM Navya Maria DO [facility]   10/17/2022 12:00 PM Delfino Dias MD [address] [place] [address] HEM ONC EMO

## 2022-08-08 ENCOUNTER — LAB ENCOUNTER (OUTPATIENT)
Dept: LAB | Facility: HOSPITAL | Age: 67
End: 2022-08-08
Attending: UROLOGY
Payer: MEDICARE

## 2022-08-08 DIAGNOSIS — D49.4 BLADDER TUMOR: ICD-10-CM

## 2022-08-08 DIAGNOSIS — C67.0 MALIGNANT NEOPLASM OF TRIGONE OF URINARY BLADDER (HCC): ICD-10-CM

## 2022-08-08 PROCEDURE — 88108 CYTOPATH CONCENTRATE TECH: CPT

## 2022-08-08 RX ORDER — ALLOPURINOL 100 MG/1
TABLET ORAL
Qty: 180 TABLET | Refills: 3 | Status: SHIPPED | OUTPATIENT
Start: 2022-08-08

## 2022-08-09 LAB — NON GYNE INTERPRETATION: NEGATIVE

## 2022-08-29 ENCOUNTER — PROCEDURE (OUTPATIENT)
Dept: SURGERY | Facility: CLINIC | Age: 67
End: 2022-08-29
Payer: COMMERCIAL

## 2022-08-29 VITALS
BODY MASS INDEX: 37.92 KG/M2 | WEIGHT: 305 LBS | HEIGHT: 75 IN | SYSTOLIC BLOOD PRESSURE: 130 MMHG | HEART RATE: 82 BPM | DIASTOLIC BLOOD PRESSURE: 90 MMHG

## 2022-08-29 DIAGNOSIS — D49.4 BLADDER TUMOR: Primary | ICD-10-CM

## 2022-08-29 DIAGNOSIS — C67.0 MALIGNANT NEOPLASM OF TRIGONE OF URINARY BLADDER (HCC): ICD-10-CM

## 2022-08-29 DIAGNOSIS — Z12.5 PROSTATE CANCER SCREENING: ICD-10-CM

## 2022-08-29 DIAGNOSIS — R82.993 HYPERURICOSURIA: ICD-10-CM

## 2022-08-29 DIAGNOSIS — N28.1 RENAL CYST: ICD-10-CM

## 2022-08-29 DIAGNOSIS — Z87.448 HISTORY OF URETHRAL STRICTURE: ICD-10-CM

## 2022-08-29 RX ORDER — DIAZEPAM 5 MG/1
TABLET ORAL
Qty: 3 TABLET | Refills: 0 | Status: SHIPPED | OUTPATIENT
Start: 2022-08-29

## 2022-08-29 RX ORDER — CIPROFLOXACIN 500 MG/1
500 TABLET, FILM COATED ORAL ONCE
Status: COMPLETED | OUTPATIENT
Start: 2022-08-29 | End: 2022-08-29

## 2022-08-29 RX ADMIN — CIPROFLOXACIN 500 MG: 500 TABLET, FILM COATED ORAL at 12:56:00

## 2022-09-01 DIAGNOSIS — C67.0 MALIGNANT NEOPLASM OF TRIGONE OF URINARY BLADDER (HCC): ICD-10-CM

## 2022-09-02 RX ORDER — OMEPRAZOLE 40 MG/1
40 CAPSULE, DELAYED RELEASE ORAL
Qty: 90 CAPSULE | Refills: 3 | OUTPATIENT
Start: 2022-09-02

## 2022-09-07 RX ORDER — DIAZEPAM 5 MG/1
TABLET ORAL
Qty: 3 TABLET | Refills: 0 | OUTPATIENT
Start: 2022-09-07

## 2022-09-28 RX ORDER — NEBIVOLOL 10 MG/1
TABLET ORAL
COMMUNITY
Start: 2022-09-28

## 2022-10-06 ENCOUNTER — PATIENT MESSAGE (OUTPATIENT)
Dept: FAMILY MEDICINE CLINIC | Facility: CLINIC | Age: 67
End: 2022-10-06

## 2022-10-06 DIAGNOSIS — E78.00 HYPERCHOLESTEROLEMIA: ICD-10-CM

## 2022-10-06 DIAGNOSIS — E55.9 VITAMIN D DEFICIENCY: Primary | ICD-10-CM

## 2022-10-06 DIAGNOSIS — M1A.09X0 IDIOPATHIC CHRONIC GOUT OF MULTIPLE SITES WITHOUT TOPHUS: ICD-10-CM

## 2022-10-06 NOTE — TELEPHONE ENCOUNTER
From: Sam Ulloa  To: Jo Rothman DO  Sent: 10/6/2022 11:08 AM CDT  Subject: Lab Work    I'm scheduled for annual physical on Friday, October 14th. Each Fall Dr. Balwinder Moore orders a series of annual lab work. That same day I'm scheduled for Dr. Libby Newsome lab work, i.e. the 14th. Thus it would be nice, in that I live over an hour away, to do the annual work ups that Dr. Balwinder Moore orders annually; including Vitamin D.   Danuta Pichardo  [phone]

## 2022-10-14 ENCOUNTER — LAB ENCOUNTER (OUTPATIENT)
Dept: LAB | Facility: HOSPITAL | Age: 67
End: 2022-10-14
Attending: FAMILY MEDICINE
Payer: MEDICARE

## 2022-10-14 ENCOUNTER — OFFICE VISIT (OUTPATIENT)
Dept: FAMILY MEDICINE CLINIC | Facility: CLINIC | Age: 67
End: 2022-10-14
Payer: COMMERCIAL

## 2022-10-14 VITALS
SYSTOLIC BLOOD PRESSURE: 146 MMHG | WEIGHT: 302 LBS | HEART RATE: 70 BPM | DIASTOLIC BLOOD PRESSURE: 84 MMHG | OXYGEN SATURATION: 97 % | BODY MASS INDEX: 37.94 KG/M2 | HEIGHT: 74.9 IN | RESPIRATION RATE: 18 BRPM

## 2022-10-14 DIAGNOSIS — E55.9 VITAMIN D DEFICIENCY: ICD-10-CM

## 2022-10-14 DIAGNOSIS — C67.9 MALIGNANT NEOPLASM OF URINARY BLADDER, UNSPECIFIED SITE (HCC): ICD-10-CM

## 2022-10-14 DIAGNOSIS — G47.33 OSA (OBSTRUCTIVE SLEEP APNEA): ICD-10-CM

## 2022-10-14 DIAGNOSIS — M1A.09X0 IDIOPATHIC CHRONIC GOUT OF MULTIPLE SITES WITHOUT TOPHUS: ICD-10-CM

## 2022-10-14 DIAGNOSIS — J34.2 NASAL SEPTAL DEVIATION: ICD-10-CM

## 2022-10-14 DIAGNOSIS — R35.1 BENIGN PROSTATIC HYPERPLASIA WITH NOCTURIA: ICD-10-CM

## 2022-10-14 DIAGNOSIS — H90.42 SENSORINEURAL HEARING LOSS (SNHL) OF LEFT EAR WITH UNRESTRICTED HEARING OF RIGHT EAR: ICD-10-CM

## 2022-10-14 DIAGNOSIS — N13.30 HYDRONEPHROSIS, LEFT: ICD-10-CM

## 2022-10-14 DIAGNOSIS — E66.01 SEVERE OBESITY (BMI 35.0-39.9) WITH COMORBIDITY (HCC): Primary | Chronic | ICD-10-CM

## 2022-10-14 DIAGNOSIS — Z00.00 ENCOUNTER FOR ANNUAL HEALTH EXAMINATION: ICD-10-CM

## 2022-10-14 DIAGNOSIS — R31.29 MICROHEMATURIA: ICD-10-CM

## 2022-10-14 DIAGNOSIS — M47.817 LUMBOSACRAL SPONDYLOSIS WITHOUT MYELOPATHY: ICD-10-CM

## 2022-10-14 DIAGNOSIS — E78.00 HYPERCHOLESTEROLEMIA: ICD-10-CM

## 2022-10-14 DIAGNOSIS — K21.9 GASTROESOPHAGEAL REFLUX DISEASE WITHOUT ESOPHAGITIS: ICD-10-CM

## 2022-10-14 DIAGNOSIS — N20.0 NEPHROLITHIASIS: ICD-10-CM

## 2022-10-14 DIAGNOSIS — N40.1 BENIGN PROSTATIC HYPERPLASIA WITH NOCTURIA: ICD-10-CM

## 2022-10-14 DIAGNOSIS — M51.26 HERNIATED NUCLEUS PULPOSUS, L4-5 LEFT: ICD-10-CM

## 2022-10-14 DIAGNOSIS — I27.20 PULMONARY HYPERTENSION (HCC): ICD-10-CM

## 2022-10-14 DIAGNOSIS — C83.00 SMALL LYMPHOCYTIC LYMPHOMA (HCC): ICD-10-CM

## 2022-10-14 DIAGNOSIS — R91.1 PULMONARY NODULE: ICD-10-CM

## 2022-10-14 DIAGNOSIS — C67.0 MALIGNANT NEOPLASM OF TRIGONE OF URINARY BLADDER (HCC): ICD-10-CM

## 2022-10-14 PROBLEM — M25.512 CHRONIC LEFT SHOULDER PAIN: Status: RESOLVED | Noted: 2021-11-01 | Resolved: 2022-10-14

## 2022-10-14 PROBLEM — G89.29 CHRONIC LEFT SHOULDER PAIN: Status: RESOLVED | Noted: 2021-11-01 | Resolved: 2022-10-14

## 2022-10-14 LAB
ALBUMIN SERPL-MCNC: 3.7 G/DL (ref 3.4–5)
ALBUMIN/GLOB SERPL: 1.1 {RATIO} (ref 1–2)
ALP LIVER SERPL-CCNC: 63 U/L
ALT SERPL-CCNC: 40 U/L
ANION GAP SERPL CALC-SCNC: 8 MMOL/L (ref 0–18)
AST SERPL-CCNC: 20 U/L (ref 15–37)
BASOPHILS # BLD AUTO: 0.05 X10(3) UL (ref 0–0.2)
BASOPHILS NFR BLD AUTO: 0.6 %
BILIRUB SERPL-MCNC: 0.8 MG/DL (ref 0.1–2)
BUN BLD-MCNC: 18 MG/DL (ref 7–18)
BUN/CREAT SERPL: 22.2 (ref 10–20)
CALCIUM BLD-MCNC: 8.9 MG/DL (ref 8.5–10.1)
CHLORIDE SERPL-SCNC: 106 MMOL/L (ref 98–112)
CHOLEST SERPL-MCNC: 194 MG/DL (ref ?–200)
CO2 SERPL-SCNC: 26 MMOL/L (ref 21–32)
CREAT BLD-MCNC: 0.81 MG/DL
DEPRECATED RDW RBC AUTO: 46.4 FL (ref 35.1–46.3)
EOSINOPHIL # BLD AUTO: 0.1 X10(3) UL (ref 0–0.7)
EOSINOPHIL NFR BLD AUTO: 1.3 %
ERYTHROCYTE [DISTWIDTH] IN BLOOD BY AUTOMATED COUNT: 12.7 % (ref 11–15)
FASTING PATIENT LIPID ANSWER: YES
FASTING STATUS PATIENT QL REPORTED: YES
GFR SERPLBLD BASED ON 1.73 SQ M-ARVRAT: 96 ML/MIN/1.73M2 (ref 60–?)
GLOBULIN PLAS-MCNC: 3.3 G/DL (ref 2.8–4.4)
GLUCOSE BLD-MCNC: 96 MG/DL (ref 70–99)
HCT VFR BLD AUTO: 47.3 %
HDLC SERPL-MCNC: 60 MG/DL (ref 40–59)
HGB BLD-MCNC: 15.8 G/DL
IMM GRANULOCYTES # BLD AUTO: 0.04 X10(3) UL (ref 0–1)
IMM GRANULOCYTES NFR BLD: 0.5 %
LDH SERPL L TO P-CCNC: 166 U/L
LDLC SERPL CALC-MCNC: 104 MG/DL (ref ?–100)
LYMPHOCYTES # BLD AUTO: 1.62 X10(3) UL (ref 1–4)
LYMPHOCYTES NFR BLD AUTO: 20.7 %
MCH RBC QN AUTO: 32.8 PG (ref 26–34)
MCHC RBC AUTO-ENTMCNC: 33.4 G/DL (ref 31–37)
MCV RBC AUTO: 98.3 FL
MONOCYTES # BLD AUTO: 0.71 X10(3) UL (ref 0.1–1)
MONOCYTES NFR BLD AUTO: 9.1 %
NEUTROPHILS # BLD AUTO: 5.29 X10 (3) UL (ref 1.5–7.7)
NEUTROPHILS # BLD AUTO: 5.29 X10(3) UL (ref 1.5–7.7)
NEUTROPHILS NFR BLD AUTO: 67.8 %
NONHDLC SERPL-MCNC: 134 MG/DL (ref ?–130)
OSMOLALITY SERPL CALC.SUM OF ELEC: 292 MOSM/KG (ref 275–295)
PLATELET # BLD AUTO: 195 10(3)UL (ref 150–450)
POTASSIUM SERPL-SCNC: 3.9 MMOL/L (ref 3.5–5.1)
PROT SERPL-MCNC: 7 G/DL (ref 6.4–8.2)
RBC # BLD AUTO: 4.81 X10(6)UL
SODIUM SERPL-SCNC: 140 MMOL/L (ref 136–145)
TRIGL SERPL-MCNC: 174 MG/DL (ref 30–149)
URATE SERPL-MCNC: 3.5 MG/DL
VIT D+METAB SERPL-MCNC: 76.2 NG/ML (ref 30–100)
VLDLC SERPL CALC-MCNC: 29 MG/DL (ref 0–30)
WBC # BLD AUTO: 7.8 X10(3) UL (ref 4–11)

## 2022-10-14 PROCEDURE — 82306 VITAMIN D 25 HYDROXY: CPT

## 2022-10-14 PROCEDURE — 80061 LIPID PANEL: CPT

## 2022-10-14 PROCEDURE — 85025 COMPLETE CBC W/AUTO DIFF WBC: CPT

## 2022-10-14 PROCEDURE — 80053 COMPREHEN METABOLIC PANEL: CPT

## 2022-10-14 PROCEDURE — 83615 LACTATE (LD) (LDH) ENZYME: CPT

## 2022-10-14 PROCEDURE — 84550 ASSAY OF BLOOD/URIC ACID: CPT

## 2022-10-14 PROCEDURE — 36415 COLL VENOUS BLD VENIPUNCTURE: CPT

## 2022-10-14 RX ORDER — PREDNISONE 20 MG/1
TABLET ORAL
COMMUNITY
Start: 2022-06-16 | End: 2022-10-14

## 2022-10-14 RX ORDER — CHLORHEXIDINE GLUCONATE 0.12 MG/ML
RINSE ORAL
COMMUNITY
Start: 2022-10-06

## 2022-10-14 RX ORDER — TIZANIDINE 4 MG/1
TABLET ORAL
COMMUNITY
Start: 2022-06-20

## 2022-10-14 RX ORDER — IBUPROFEN 800 MG/1
800 TABLET ORAL EVERY 6 HOURS PRN
COMMUNITY
End: 2022-10-14

## 2022-10-14 RX ORDER — AMOXICILLIN AND CLAVULANATE POTASSIUM 875; 125 MG/1; MG/1
1 TABLET, FILM COATED ORAL 2 TIMES DAILY
Qty: 14 TABLET | Refills: 0 | Status: SHIPPED | OUTPATIENT
Start: 2022-10-14 | End: 2022-10-15

## 2022-10-15 ENCOUNTER — TELEPHONE (OUTPATIENT)
Dept: FAMILY MEDICINE CLINIC | Facility: CLINIC | Age: 67
End: 2022-10-15

## 2022-10-15 RX ORDER — AMOXICILLIN AND CLAVULANATE POTASSIUM 875; 125 MG/1; MG/1
1 TABLET, FILM COATED ORAL 2 TIMES DAILY
Qty: 14 TABLET | Refills: 0 | Status: SHIPPED | OUTPATIENT
Start: 2022-10-15 | End: 2022-10-22

## 2022-10-15 NOTE — TELEPHONE ENCOUNTER
Patient called and states [facility] does not have his amox/clavulanate that was sent yesterday. [facility] Patient to send [employer] message within 15-30 min. If [facility] still [facility]'t [facility] it.

## 2022-10-17 ENCOUNTER — OFFICE VISIT (OUTPATIENT)
Dept: HEMATOLOGY/ONCOLOGY | Facility: HOSPITAL | Age: 67
End: 2022-10-17
Attending: INTERNAL MEDICINE
Payer: MEDICARE

## 2022-10-17 VITALS
BODY MASS INDEX: 37.42 KG/M2 | SYSTOLIC BLOOD PRESSURE: 142 MMHG | HEIGHT: 75 IN | WEIGHT: 301 LBS | DIASTOLIC BLOOD PRESSURE: 75 MMHG | HEART RATE: 70 BPM | OXYGEN SATURATION: 97 % | RESPIRATION RATE: 18 BRPM | TEMPERATURE: 99 F

## 2022-10-17 DIAGNOSIS — R91.1 PULMONARY NODULE: ICD-10-CM

## 2022-10-17 DIAGNOSIS — C83.00 SMALL LYMPHOCYTIC LYMPHOMA (HCC): ICD-10-CM

## 2022-10-17 DIAGNOSIS — C67.9 MALIGNANT NEOPLASM OF URINARY BLADDER, UNSPECIFIED SITE (HCC): Primary | ICD-10-CM

## 2022-10-17 PROCEDURE — 99214 OFFICE O/P EST MOD 30 MIN: CPT | Performed by: INTERNAL MEDICINE

## 2022-11-01 ENCOUNTER — PATIENT MESSAGE (OUTPATIENT)
Dept: FAMILY MEDICINE CLINIC | Facility: CLINIC | Age: 67
End: 2022-11-01

## 2022-11-01 NOTE — TELEPHONE ENCOUNTER
Last office visit 10/14/22  Please advise. Pharmacy updated. From: Cale Tomlin  To: Kareem Collazo DO  Sent: 11/1/2022 11:08 AM CDT  Subject: Sinusitis infection     The prescription did ok and cleared me up for several days, but now I once again have the full fledged mucous that smells terrible. I'm on the road and will be in [place] this afternoon. Any chance to do another 7 day cycle to knock this out of me?     If yes, closest [facility] is  [address] [place] [address]  [phone]    Rik Babcock  [phone]

## 2022-11-05 RX ORDER — AMOXICILLIN AND CLAVULANATE POTASSIUM 875; 125 MG/1; MG/1
1 TABLET, FILM COATED ORAL 2 TIMES DAILY
Qty: 14 TABLET | Refills: 0 | Status: SHIPPED | OUTPATIENT
Start: 2022-11-05 | End: 2022-11-12

## 2023-01-04 RX ORDER — MELOXICAM 15 MG/1
TABLET ORAL
Qty: 90 TABLET | Refills: 1 | Status: SHIPPED | OUTPATIENT
Start: 2023-01-04

## 2023-01-04 NOTE — TELEPHONE ENCOUNTER
Refill passed per [address] [place] [address] protocol. Requested Prescriptions   Pending Prescriptions Disp Refills    MELOXICAM 15 MG Oral Tab [Pharmacy Med Name: Meloxicam 15 MG Oral Tablet] 90 tablet 3     Sig: TAKE 1 TABLET BY MOUTH  DAILY       Non-Narcotic Pain Medication Protocol Passed - 1/4/2023 11:10 AM        Passed - In person appointment or virtual visit in the past 6 mos or appointment in next 3 mos     Recent Outpatient Visits              2 months ago Malignant neoplasm of urinary bladder, unspecified site [facility])    [facility] Chin Bass MD    Office Visit    2 months ago Severe obesity (BMI 35.0-39. 9) with comorbidity [facility])    [address] [facility] [address], [address], [place]    Office Visit    7 months ago Malignant neoplasm of urinary bladder, unspecified site [facility])    [facility] Chin aBss MD    Office Visit    10 months ago Chronic bilateral low back pain without sciatica    Pain Management - [address] [place] [address]    Office Visit    10 months ago Malignant neoplasm of urinary bladder, unspecified site [facility])    [facility] Chin Bass MD    Office Visit          Future Appointments         Provider Department Appt Notes    In 3 months Chin Bass MD [facility] FOLLOW UP VISIT. CL  6M    In 5 months Haydee Pride, [facility], [address], [place] pt bladder ca     In 5 months Haydee Pride, [facility], [address] cysto BTS per [employer]                     Recent Outpatient Visits              2 months ago Malignant neoplasm of urinary bladder, unspecified site [facility])    [facility] Chin Bass MD    Office Visit    2 months ago Severe obesity (BMI 35.0-39. 9) with comorbidity [facility])    [address], [address], [place]    Office Visit 7 months ago Malignant neoplasm of urinary bladder, unspecified site [facility])    [facility] Sandi Cervantes MD    Office Visit    10 months ago Chronic bilateral low back pain without sciatica    Pain Management - [address] [place] [address]    Office Visit    10 months ago Malignant neoplasm of urinary bladder, unspecified site [facility])    [facility] Sandi Cervantes MD    Office Visit            Future Appointments         Provider Department Appt Notes    In 3 months Sandi Cervantes MD [facility]ematology Oncology FOLLOW UP VISIT. CL  6M    In 5 months Fani Chambers, [facility], [facility] pt bladder ca     In 5 months Fani Chambers, [facility], [address] [place] [address] Rd,[address], [place] cysto BTS per [employer]

## 2023-01-27 ENCOUNTER — TELEPHONE (OUTPATIENT)
Dept: OTOLARYNGOLOGY | Facility: CLINIC | Age: 68
End: 2023-01-27

## 2023-02-08 ENCOUNTER — TELEPHONE (OUTPATIENT)
Dept: DERMATOLOGY CLINIC | Facility: CLINIC | Age: 68
End: 2023-02-08

## 2023-02-10 ENCOUNTER — TELEPHONE (OUTPATIENT)
Dept: OTOLARYNGOLOGY | Facility: CLINIC | Age: 68
End: 2023-02-10

## 2023-02-10 NOTE — TELEPHONE ENCOUNTER
LANRE and also sent My Chart message. He will need to schedule an appointment to establish care for future refills.

## 2023-04-13 ENCOUNTER — LAB ENCOUNTER (OUTPATIENT)
Dept: LAB | Facility: HOSPITAL | Age: 68
End: 2023-04-13
Attending: INTERNAL MEDICINE
Payer: MEDICARE

## 2023-04-13 DIAGNOSIS — R91.1 PULMONARY NODULE: ICD-10-CM

## 2023-04-13 DIAGNOSIS — C83.00 SMALL LYMPHOCYTIC LYMPHOMA (HCC): ICD-10-CM

## 2023-04-13 DIAGNOSIS — Z12.5 PROSTATE CANCER SCREENING: ICD-10-CM

## 2023-04-13 DIAGNOSIS — C67.9 MALIGNANT NEOPLASM OF URINARY BLADDER, UNSPECIFIED SITE (HCC): ICD-10-CM

## 2023-04-13 LAB
ALBUMIN SERPL-MCNC: 3.4 G/DL (ref 3.4–5)
ALBUMIN/GLOB SERPL: 1 {RATIO} (ref 1–2)
ALP LIVER SERPL-CCNC: 65 U/L
ALT SERPL-CCNC: 52 U/L
ANION GAP SERPL CALC-SCNC: 5 MMOL/L (ref 0–18)
AST SERPL-CCNC: 24 U/L (ref 15–37)
BASOPHILS # BLD AUTO: 0.06 X10(3) UL (ref 0–0.2)
BASOPHILS NFR BLD AUTO: 0.6 %
BILIRUB SERPL-MCNC: 0.8 MG/DL (ref 0.1–2)
BUN BLD-MCNC: 23 MG/DL (ref 7–18)
BUN/CREAT SERPL: 28.4 (ref 10–20)
CALCIUM BLD-MCNC: 8.5 MG/DL (ref 8.5–10.1)
CHLORIDE SERPL-SCNC: 105 MMOL/L (ref 98–112)
CO2 SERPL-SCNC: 30 MMOL/L (ref 21–32)
COMPLEXED PSA SERPL-MCNC: 1.69 NG/ML (ref ?–4)
CREAT BLD-MCNC: 0.81 MG/DL
DEPRECATED RDW RBC AUTO: 48.5 FL (ref 35.1–46.3)
EOSINOPHIL # BLD AUTO: 0.09 X10(3) UL (ref 0–0.7)
EOSINOPHIL NFR BLD AUTO: 0.9 %
ERYTHROCYTE [DISTWIDTH] IN BLOOD BY AUTOMATED COUNT: 13.1 % (ref 11–15)
FASTING STATUS PATIENT QL REPORTED: NO
GFR SERPLBLD BASED ON 1.73 SQ M-ARVRAT: 96 ML/MIN/1.73M2 (ref 60–?)
GLOBULIN PLAS-MCNC: 3.3 G/DL (ref 2.8–4.4)
GLUCOSE BLD-MCNC: 87 MG/DL (ref 70–99)
HCT VFR BLD AUTO: 47.4 %
HGB BLD-MCNC: 15.9 G/DL
IMM GRANULOCYTES # BLD AUTO: 0.17 X10(3) UL (ref 0–1)
IMM GRANULOCYTES NFR BLD: 1.7 %
LDH SERPL L TO P-CCNC: 192 U/L
LYMPHOCYTES # BLD AUTO: 2.75 X10(3) UL (ref 1–4)
LYMPHOCYTES NFR BLD AUTO: 27.3 %
MCH RBC QN AUTO: 33.6 PG (ref 26–34)
MCHC RBC AUTO-ENTMCNC: 33.5 G/DL (ref 31–37)
MCV RBC AUTO: 100.2 FL
MONOCYTES # BLD AUTO: 0.9 X10(3) UL (ref 0.1–1)
MONOCYTES NFR BLD AUTO: 8.9 %
NEUTROPHILS # BLD AUTO: 6.12 X10 (3) UL (ref 1.5–7.7)
NEUTROPHILS # BLD AUTO: 6.12 X10(3) UL (ref 1.5–7.7)
NEUTROPHILS NFR BLD AUTO: 60.6 %
OSMOLALITY SERPL CALC.SUM OF ELEC: 293 MOSM/KG (ref 275–295)
PLATELET # BLD AUTO: 219 10(3)UL (ref 150–450)
POTASSIUM SERPL-SCNC: 4.3 MMOL/L (ref 3.5–5.1)
PROT SERPL-MCNC: 6.7 G/DL (ref 6.4–8.2)
RBC # BLD AUTO: 4.73 X10(6)UL
SODIUM SERPL-SCNC: 140 MMOL/L (ref 136–145)
WBC # BLD AUTO: 10.1 X10(3) UL (ref 4–11)

## 2023-04-13 PROCEDURE — 80053 COMPREHEN METABOLIC PANEL: CPT

## 2023-04-13 PROCEDURE — 36415 COLL VENOUS BLD VENIPUNCTURE: CPT

## 2023-04-13 PROCEDURE — 83615 LACTATE (LD) (LDH) ENZYME: CPT

## 2023-04-13 PROCEDURE — 85025 COMPLETE CBC W/AUTO DIFF WBC: CPT

## 2023-04-17 ENCOUNTER — OFFICE VISIT (OUTPATIENT)
Dept: HEMATOLOGY/ONCOLOGY | Facility: HOSPITAL | Age: 68
End: 2023-04-17
Attending: INTERNAL MEDICINE
Payer: MEDICARE

## 2023-04-17 VITALS
WEIGHT: 304 LBS | BODY MASS INDEX: 37.8 KG/M2 | HEART RATE: 65 BPM | SYSTOLIC BLOOD PRESSURE: 131 MMHG | HEIGHT: 75 IN | OXYGEN SATURATION: 96 % | DIASTOLIC BLOOD PRESSURE: 68 MMHG | TEMPERATURE: 99 F | RESPIRATION RATE: 18 BRPM

## 2023-04-17 DIAGNOSIS — C83.00 SMALL LYMPHOCYTIC LYMPHOMA (HCC): ICD-10-CM

## 2023-04-17 DIAGNOSIS — C67.9 MALIGNANT NEOPLASM OF URINARY BLADDER, UNSPECIFIED SITE (HCC): Primary | ICD-10-CM

## 2023-04-17 PROCEDURE — 99214 OFFICE O/P EST MOD 30 MIN: CPT | Performed by: INTERNAL MEDICINE

## 2023-04-17 RX ORDER — OMEPRAZOLE 40 MG/1
CAPSULE, DELAYED RELEASE ORAL
Qty: 90 CAPSULE | Refills: 0 | Status: SHIPPED | OUTPATIENT
Start: 2023-04-17

## 2023-04-17 RX ORDER — ASPIRIN 81 MG/1
81 TABLET ORAL DAILY
COMMUNITY
Start: 2023-04-06

## 2023-04-17 RX ORDER — ATORVASTATIN CALCIUM 40 MG/1
40 TABLET, FILM COATED ORAL DAILY
COMMUNITY
Start: 2023-04-04

## 2023-04-17 RX ORDER — SACCHAROMYCES BOULARDII 250 MG
250 CAPSULE ORAL DAILY
COMMUNITY
Start: 2023-04-06

## 2023-04-17 NOTE — TELEPHONE ENCOUNTER
Please ask him to schedule a routine follow up as it has been 2 years since he has been seen.     Thanks    Sera Mims MD  [address] - Gastroenterology  4/17/2023  4:29 PM

## 2023-04-18 NOTE — TELEPHONE ENCOUNTER
Left message for the patient to call and set up a follow up appointment     If the patient calls please schedule an appointment with Dr Jeff Velazquez at his next available

## 2023-04-21 RX ORDER — TAMSULOSIN HYDROCHLORIDE 0.4 MG/1
CAPSULE ORAL
Qty: 180 CAPSULE | Refills: 3 | Status: SHIPPED | OUTPATIENT
Start: 2023-04-21

## 2023-04-27 ENCOUNTER — TELEPHONE (OUTPATIENT)
Dept: GASTROENTEROLOGY | Facility: CLINIC | Age: 68
End: 2023-04-27

## 2023-04-27 NOTE — TELEPHONE ENCOUNTER
Dr. Alcides Urbina,    I spoke to Chad Foreman who called in for upper left chest pain related to gerd symptoms. States it started about a week ago. Has been taking omeprazole daily. He is having a stent placed next week, notes changes in medications. He was told by his cardiologist that the chest pain is not related to cardio. I did schedule an office visit for next available (August). Avoiding trigger foods. Please advise, thank you.

## 2023-04-27 NOTE — TELEPHONE ENCOUNTER
Contacted patient and reviewed note below. Patient verbalized understanding and scheduled sooner appt on 6/20.      Your appointments     Date & Time Appointment Department [facility])    Jun 20, 2023  9:00 AM CDT Follow Up Visit with Janet Simon MD [facility], [address] [place] [address],[address], [place] ([address])

## 2023-04-27 NOTE — TELEPHONE ENCOUNTER
I recommend cardiac evaluation and management first. I'm happy to see him after cardiac evaluation. Can offer an available appointment with me prior to August with me and after his cardiac eval/testing.     Thanks    Jessica Soriano MD  [address] - Gastroenterology  4/27/2023  4:01 PM

## 2023-05-01 ENCOUNTER — NURSE ONLY (OUTPATIENT)
Dept: LAB | Facility: HOSPITAL | Age: 68
End: 2023-05-01
Attending: FAMILY MEDICINE
Payer: MEDICARE

## 2023-05-01 ENCOUNTER — MED REC SCAN ONLY (OUTPATIENT)
Dept: FAMILY MEDICINE CLINIC | Facility: CLINIC | Age: 68
End: 2023-05-01

## 2023-05-01 DIAGNOSIS — M1A.09X0 CHRONIC GOUT OF MULTIPLE SITES, UNSPECIFIED CAUSE: ICD-10-CM

## 2023-05-01 DIAGNOSIS — E78.00 HYPERCHOLESTEROLEMIA: ICD-10-CM

## 2023-05-01 DIAGNOSIS — Z12.5 PROSTATE CANCER SCREENING: ICD-10-CM

## 2023-05-01 DIAGNOSIS — E55.9 VITAMIN D DEFICIENCY: ICD-10-CM

## 2023-05-01 DIAGNOSIS — Z01.818 PRE-OP TESTING: ICD-10-CM

## 2023-05-01 DIAGNOSIS — R31.29 MICROHEMATURIA: ICD-10-CM

## 2023-05-01 LAB
ALBUMIN SERPL-MCNC: 3.5 G/DL (ref 3.4–5)
ALBUMIN/GLOB SERPL: 1 {RATIO} (ref 1–2)
ALP LIVER SERPL-CCNC: 66 U/L
ALT SERPL-CCNC: 48 U/L
ANION GAP SERPL CALC-SCNC: 6 MMOL/L (ref 0–18)
AST SERPL-CCNC: 21 U/L (ref 15–37)
BASOPHILS # BLD AUTO: 0.04 X10(3) UL (ref 0–0.2)
BASOPHILS NFR BLD AUTO: 0.5 %
BILIRUB SERPL-MCNC: 0.8 MG/DL (ref 0.1–2)
BILIRUB UR QL: NEGATIVE
BUN BLD-MCNC: 25 MG/DL (ref 7–18)
BUN/CREAT SERPL: 30.9 (ref 10–20)
CALCIUM BLD-MCNC: 8.8 MG/DL (ref 8.5–10.1)
CHLORIDE SERPL-SCNC: 109 MMOL/L (ref 98–112)
CHOLEST SERPL-MCNC: 157 MG/DL (ref ?–200)
CLARITY UR: CLEAR
CO2 SERPL-SCNC: 26 MMOL/L (ref 21–32)
COLOR UR: YELLOW
COMPLEXED PSA SERPL-MCNC: 1.81 NG/ML (ref ?–4)
CREAT BLD-MCNC: 0.81 MG/DL
DEPRECATED RDW RBC AUTO: 48.5 FL (ref 35.1–46.3)
EOSINOPHIL # BLD AUTO: 0.13 X10(3) UL (ref 0–0.7)
EOSINOPHIL NFR BLD AUTO: 1.6 %
ERYTHROCYTE [DISTWIDTH] IN BLOOD BY AUTOMATED COUNT: 13 % (ref 11–15)
FASTING PATIENT LIPID ANSWER: YES
FASTING STATUS PATIENT QL REPORTED: YES
GFR SERPLBLD BASED ON 1.73 SQ M-ARVRAT: 96 ML/MIN/1.73M2 (ref 60–?)
GLOBULIN PLAS-MCNC: 3.5 G/DL (ref 2.8–4.4)
GLUCOSE BLD-MCNC: 93 MG/DL (ref 70–99)
GLUCOSE UR-MCNC: NORMAL MG/DL
HCT VFR BLD AUTO: 47.9 %
HDLC SERPL-MCNC: 89 MG/DL (ref 40–59)
HGB BLD-MCNC: 16.1 G/DL
HGB UR QL STRIP.AUTO: NEGATIVE
IMM GRANULOCYTES # BLD AUTO: 0.04 X10(3) UL (ref 0–1)
IMM GRANULOCYTES NFR BLD: 0.5 %
INR BLD: 0.9 (ref 0.85–1.16)
KETONES UR-MCNC: NEGATIVE MG/DL
LDLC SERPL CALC-MCNC: 50 MG/DL (ref ?–100)
LEUKOCYTE ESTERASE UR QL STRIP.AUTO: NEGATIVE
LYMPHOCYTES # BLD AUTO: 2.19 X10(3) UL (ref 1–4)
LYMPHOCYTES NFR BLD AUTO: 26.2 %
MCH RBC QN AUTO: 33.8 PG (ref 26–34)
MCHC RBC AUTO-ENTMCNC: 33.6 G/DL (ref 31–37)
MCV RBC AUTO: 100.6 FL
MONOCYTES # BLD AUTO: 0.71 X10(3) UL (ref 0.1–1)
MONOCYTES NFR BLD AUTO: 8.5 %
NEUTROPHILS # BLD AUTO: 5.26 X10 (3) UL (ref 1.5–7.7)
NEUTROPHILS # BLD AUTO: 5.26 X10(3) UL (ref 1.5–7.7)
NEUTROPHILS NFR BLD AUTO: 62.7 %
NITRITE UR QL STRIP.AUTO: NEGATIVE
NONHDLC SERPL-MCNC: 68 MG/DL (ref ?–130)
OSMOLALITY SERPL CALC.SUM OF ELEC: 296 MOSM/KG (ref 275–295)
PH UR: 5.5 [PH] (ref 5–8)
PLATELET # BLD AUTO: 178 10(3)UL (ref 150–450)
POTASSIUM SERPL-SCNC: 4.4 MMOL/L (ref 3.5–5.1)
PROT SERPL-MCNC: 7 G/DL (ref 6.4–8.2)
PROTHROMBIN TIME: 12.2 SECONDS (ref 11.6–14.8)
RBC # BLD AUTO: 4.76 X10(6)UL
SODIUM SERPL-SCNC: 141 MMOL/L (ref 136–145)
SP GR UR STRIP: 1.03 (ref 1–1.03)
TRIGL SERPL-MCNC: 103 MG/DL (ref 30–149)
URATE SERPL-MCNC: 3.4 MG/DL
UROBILINOGEN UR STRIP-ACNC: NORMAL
VIT D+METAB SERPL-MCNC: 42.9 NG/ML (ref 30–100)
VLDLC SERPL CALC-MCNC: 15 MG/DL (ref 0–30)
WBC # BLD AUTO: 8.4 X10(3) UL (ref 4–11)

## 2023-05-01 PROCEDURE — 85025 COMPLETE CBC W/AUTO DIFF WBC: CPT

## 2023-05-01 PROCEDURE — 36415 COLL VENOUS BLD VENIPUNCTURE: CPT

## 2023-05-01 PROCEDURE — 82306 VITAMIN D 25 HYDROXY: CPT

## 2023-05-01 PROCEDURE — 80061 LIPID PANEL: CPT

## 2023-05-01 PROCEDURE — 84550 ASSAY OF BLOOD/URIC ACID: CPT

## 2023-05-01 PROCEDURE — 81001 URINALYSIS AUTO W/SCOPE: CPT

## 2023-05-01 PROCEDURE — 80053 COMPREHEN METABOLIC PANEL: CPT

## 2023-05-01 PROCEDURE — 85610 PROTHROMBIN TIME: CPT

## 2023-05-04 ENCOUNTER — HOSPITAL ENCOUNTER (OUTPATIENT)
Dept: INTERVENTIONAL RADIOLOGY/VASCULAR | Facility: HOSPITAL | Age: 68
Discharge: HOME | End: 2023-05-04
Attending: INTERNAL MEDICINE | Admitting: INTERNAL MEDICINE
Payer: MEDICARE

## 2023-05-04 VITALS
WEIGHT: 300 LBS | DIASTOLIC BLOOD PRESSURE: 65 MMHG | BODY MASS INDEX: 37.3 KG/M2 | HEIGHT: 75 IN | OXYGEN SATURATION: 92 % | RESPIRATION RATE: 13 BRPM | TEMPERATURE: 97 F | HEART RATE: 83 BPM | SYSTOLIC BLOOD PRESSURE: 112 MMHG

## 2023-05-04 DIAGNOSIS — I25.119 ATHEROSCLEROTIC HEART DISEASE OF NATIVE CORONARY ARTERY WITH UNSPECIFIED ANGINA PECTORIS (HCC): ICD-10-CM

## 2023-05-04 DIAGNOSIS — Z01.818 PRE-OP TESTING: Primary | ICD-10-CM

## 2023-05-04 PROCEDURE — B2111ZZ FLUOROSCOPY OF MULTIPLE CORONARY ARTERIES USING LOW OSMOLAR CONTRAST: ICD-10-PCS | Performed by: INTERNAL MEDICINE

## 2023-05-04 PROCEDURE — B2151ZZ FLUOROSCOPY OF LEFT HEART USING LOW OSMOLAR CONTRAST: ICD-10-PCS | Performed by: INTERNAL MEDICINE

## 2023-05-04 PROCEDURE — 93458 L HRT ARTERY/VENTRICLE ANGIO: CPT | Performed by: INTERNAL MEDICINE

## 2023-05-04 PROCEDURE — 99152 MOD SED SAME PHYS/QHP 5/>YRS: CPT | Performed by: INTERNAL MEDICINE

## 2023-05-04 PROCEDURE — 36415 COLL VENOUS BLD VENIPUNCTURE: CPT

## 2023-05-04 PROCEDURE — 4A023N7 MEASUREMENT OF CARDIAC SAMPLING AND PRESSURE, LEFT HEART, PERCUTANEOUS APPROACH: ICD-10-PCS | Performed by: INTERNAL MEDICINE

## 2023-05-04 RX ORDER — SODIUM CHLORIDE 9 MG/ML
INJECTION, SOLUTION INTRAVENOUS
Status: COMPLETED | OUTPATIENT
Start: 2023-05-04 | End: 2023-05-04

## 2023-05-04 RX ORDER — LIDOCAINE HYDROCHLORIDE 20 MG/ML
INJECTION, SOLUTION EPIDURAL; INFILTRATION; INTRACAUDAL; PERINEURAL
Status: COMPLETED
Start: 2023-05-04 | End: 2023-05-04

## 2023-05-04 RX ORDER — HEPARIN SODIUM 1000 [USP'U]/ML
INJECTION, SOLUTION INTRAVENOUS; SUBCUTANEOUS
Status: COMPLETED
Start: 2023-05-04 | End: 2023-05-04

## 2023-05-04 RX ORDER — MIDAZOLAM HYDROCHLORIDE 1 MG/ML
INJECTION INTRAMUSCULAR; INTRAVENOUS
Status: COMPLETED
Start: 2023-05-04 | End: 2023-05-04

## 2023-05-04 RX ORDER — NITROGLYCERIN 20 MG/100ML
INJECTION INTRAVENOUS
Status: COMPLETED
Start: 2023-05-04 | End: 2023-05-04

## 2023-05-04 RX ORDER — VERAPAMIL HYDROCHLORIDE 2.5 MG/ML
INJECTION, SOLUTION INTRAVENOUS
Status: COMPLETED
Start: 2023-05-04 | End: 2023-05-04

## 2023-05-04 RX ADMIN — SODIUM CHLORIDE: 9 INJECTION, SOLUTION INTRAVENOUS at 08:39:00

## 2023-05-04 NOTE — IVS NOTE
DISCHARGE NOTE     Pt is able to sit up and ambulate without difficulty. Pt tolerated fluids and food. Procedural site remains dry and intact with good circulation, motion, and sensation. No signs and symptoms of bleeding/hematoma noted. IV access removed  Instruction provided, patient/family verbalizes understanding. Dr. Paul Christy spoke with patient/family post procedure.      Pt discharge via wheelchair to [address] [place] [address] B     Follow up Appointment: as already scheduled with Dr. Paul Christy

## 2023-05-04 NOTE — BRIEF OP NOTE
Preop diagnosis: abn stress test  Post op diagnosis: nl cors  Procedures: [facility] cors  Findings: normal cors and edp  EBL: 20 mls  Specimens: None

## 2023-05-18 ENCOUNTER — OFFICE VISIT (OUTPATIENT)
Dept: FAMILY MEDICINE CLINIC | Facility: CLINIC | Age: 68
End: 2023-05-18

## 2023-05-18 VITALS
WEIGHT: 303 LBS | DIASTOLIC BLOOD PRESSURE: 86 MMHG | HEIGHT: 75 IN | BODY MASS INDEX: 37.67 KG/M2 | HEART RATE: 68 BPM | SYSTOLIC BLOOD PRESSURE: 130 MMHG

## 2023-05-18 DIAGNOSIS — E66.01 SEVERE OBESITY (BMI 35.0-39.9) WITH COMORBIDITY (HCC): Primary | Chronic | ICD-10-CM

## 2023-05-18 DIAGNOSIS — J34.2 NASAL SEPTAL DEVIATION: ICD-10-CM

## 2023-05-18 DIAGNOSIS — I27.20 PULMONARY HYPERTENSION (HCC): ICD-10-CM

## 2023-05-18 DIAGNOSIS — C67.0 MALIGNANT NEOPLASM OF TRIGONE OF URINARY BLADDER (HCC): ICD-10-CM

## 2023-05-18 DIAGNOSIS — R35.1 BENIGN PROSTATIC HYPERPLASIA WITH NOCTURIA: ICD-10-CM

## 2023-05-18 DIAGNOSIS — N20.0 NEPHROLITHIASIS: ICD-10-CM

## 2023-05-18 DIAGNOSIS — M51.26 HERNIATED NUCLEUS PULPOSUS, L4-5 LEFT: ICD-10-CM

## 2023-05-18 DIAGNOSIS — H90.42 SENSORINEURAL HEARING LOSS (SNHL) OF LEFT EAR WITH UNRESTRICTED HEARING OF RIGHT EAR: ICD-10-CM

## 2023-05-18 DIAGNOSIS — G47.33 OSA (OBSTRUCTIVE SLEEP APNEA): ICD-10-CM

## 2023-05-18 DIAGNOSIS — N13.30 HYDRONEPHROSIS, LEFT: ICD-10-CM

## 2023-05-18 DIAGNOSIS — N40.1 BENIGN PROSTATIC HYPERPLASIA WITH NOCTURIA: ICD-10-CM

## 2023-05-18 DIAGNOSIS — M1A.09X0 IDIOPATHIC CHRONIC GOUT OF MULTIPLE SITES WITHOUT TOPHUS: ICD-10-CM

## 2023-05-18 DIAGNOSIS — E78.00 HYPERCHOLESTEROLEMIA: ICD-10-CM

## 2023-05-18 DIAGNOSIS — M54.50 CHRONIC BILATERAL LOW BACK PAIN WITHOUT SCIATICA: ICD-10-CM

## 2023-05-18 DIAGNOSIS — Z00.00 ENCOUNTER FOR ANNUAL HEALTH EXAMINATION: ICD-10-CM

## 2023-05-18 DIAGNOSIS — G89.29 CHRONIC BILATERAL LOW BACK PAIN WITHOUT SCIATICA: ICD-10-CM

## 2023-05-18 DIAGNOSIS — K21.9 GASTROESOPHAGEAL REFLUX DISEASE WITHOUT ESOPHAGITIS: ICD-10-CM

## 2023-05-18 DIAGNOSIS — R31.29 MICROHEMATURIA: ICD-10-CM

## 2023-05-18 DIAGNOSIS — M47.817 LUMBOSACRAL SPONDYLOSIS WITHOUT MYELOPATHY: ICD-10-CM

## 2023-05-18 RX ORDER — CHLORHEXIDINE GLUCONATE 0.12 MG/ML
RINSE ORAL
COMMUNITY

## 2023-05-18 RX ORDER — LATANOPROST 50 UG/ML
1 SOLUTION/ DROPS OPHTHALMIC NIGHTLY
COMMUNITY
Start: 2023-04-20

## 2023-05-18 RX ORDER — PANTOPRAZOLE SODIUM 40 MG/1
40 TABLET, DELAYED RELEASE ORAL
Qty: 30 TABLET | Refills: 0 | Status: SHIPPED | OUTPATIENT
Start: 2023-05-18

## 2023-05-24 RX ORDER — ISOSORBIDE MONONITRATE 30 MG/1
TABLET, EXTENDED RELEASE ORAL
COMMUNITY
Start: 2023-05-20

## 2023-06-15 ENCOUNTER — LAB ENCOUNTER (OUTPATIENT)
Dept: LAB | Facility: HOSPITAL | Age: 68
End: 2023-06-15
Attending: INTERNAL MEDICINE
Payer: MEDICARE

## 2023-06-15 ENCOUNTER — HOSPITAL ENCOUNTER (OUTPATIENT)
Age: 68
End: 2023-06-15
Attending: OTOLARYNGOLOGY | Admitting: OTOLARYNGOLOGY

## 2023-06-15 DIAGNOSIS — C67.0 MALIGNANT NEOPLASM OF TRIGONE OF URINARY BLADDER (HCC): ICD-10-CM

## 2023-06-15 PROCEDURE — 88108 CYTOPATH CONCENTRATE TECH: CPT

## 2023-06-16 LAB — NON GYNE INTERPRETATION: NEGATIVE

## 2023-06-16 RX ORDER — PANTOPRAZOLE SODIUM 40 MG/1
TABLET, DELAYED RELEASE ORAL
Qty: 30 TABLET | Refills: 11 | OUTPATIENT
Start: 2023-06-16

## 2023-06-16 RX ORDER — PANTOPRAZOLE SODIUM 40 MG/1
40 TABLET, DELAYED RELEASE ORAL
Qty: 90 TABLET | Refills: 3 | OUTPATIENT
Start: 2023-06-16

## 2023-06-16 NOTE — TELEPHONE ENCOUNTER
See patient's reply. No need for refill. Medication record updated, Pantoprazole discontinued  (given for short supply only ).        Future Appointments   Date Time Provider Jose Angel Urrutia   6/20/2023  9:00 AM Aniyah Ford MD [address]   8/7/2023 11:15 AM Jessie Palacios MD [address] [place] [address] HEM ONC EMO   8/29/2023 11:00 AM Aniyah Ford MD [facility]

## 2023-06-16 NOTE — TELEPHONE ENCOUNTER
Spoke with patient. He states he is taking pantoprazole, has 5 left, will meet with GI Dr Pati Fletcher on 6/20 to determine which PPI to continue.   Refill refused per patient request.      Future Appointments   Date Time Provider Jose Angel Ghada   6/20/2023  9:00 AM Kadie Kulkarni MD [address]   8/7/2023 11:15 AM Kaity Palacios MD [address] [place] [address] HEM ONC EMO   8/29/2023 11:00 AM Kadie Kulkarni MD [facility]

## 2023-06-16 NOTE — TELEPHONE ENCOUNTER
[employer] message sent to pt, pls clarify with pt if he is taking omeprazole or pantoprazole? Refill passed per [facility] protocol   Requested Prescriptions   Pending Prescriptions Disp Refills    PANTOPRAZOLE 40 MG Oral Tab EC [Pharmacy Med Name: Pantoprazole Sodium 40 MG Oral Tablet Delayed Release] 30 tablet 11     Sig: TAKE 1 TABLET BY MOUTH IN THE  MORNING BEFORE BREAKFAST       Gastrointestional Medication Protocol Passed - 6/15/2023 10:41 AM        Passed - In person appointment or virtual visit in the past 12 mos or appointment in next 3 mos     Recent Outpatient Visits              4 weeks ago Severe obesity (BMI 35.0-39. 9) with comorbidity [facility])    [address], [place]    Office Visit    1 month ago Pre-op testing    [facility] [place]    Nurse Only    1 month ago Malignant neoplasm of urinary bladder, unspecified site [facility] Hematology Oncology El Klein MD    Office Visit    8 months ago Malignant neoplasm of urinary bladder, unspecified site [facility])    [facility] Hematology Oncology El Klein MD    Office Visit    8 months ago Severe obesity (BMI 35.0-39. 9) with comorbidity [facility])    [address], [place]    Office Visit          Future Appointments         Provider Department Appt Notes    In 5 days Dangelo Flaherty MD [address], [address], [place] follow/up/per MG    In 1 month Nieves [address] Hematology Oncology f/u w outpt lab caf    In 2 months Dangelo Flaherty MD [address], [address], [place] follow/up/ap

## 2023-06-19 RX ORDER — MELOXICAM 15 MG/1
TABLET ORAL
Qty: 90 TABLET | Refills: 3 | Status: SHIPPED | OUTPATIENT
Start: 2023-06-19

## 2023-06-19 NOTE — TELEPHONE ENCOUNTER
Refill passed per [facility] protocol    Requested Prescriptions   Pending Prescriptions Disp Refills    MELOXICAM 15 MG Oral Tab [Pharmacy Med Name: Meloxicam 15 MG Oral Tablet] 90 tablet 3     Sig: TAKE 1 TABLET BY MOUTH  DAILY       Non-Narcotic Pain Medication Protocol Passed - 6/19/2023  9:12 AM        Passed - In person appointment or virtual visit in the past 6 mos or appointment in next 3 mos     Recent Outpatient Visits              1 month ago Severe obesity (BMI 35.0-39. 9) with comorbidity [facility])    [address] [facility] [place], [place]    Office Visit    1 month ago Pre-op testing    Naeem Ayala    Nurse Only    2 months ago Malignant neoplasm of urinary bladder, unspecified site [facility])    [facility] Hematology Oncology Milka Vines MD    Office Visit    8 months ago Malignant neoplasm of urinary bladder, unspecified site [facility])    [facility] Hematology Oncology Milka Vines MD    Office Visit    8 months ago Severe obesity (BMI 35.0-39. 9) with comorbidity [facility])    [address] [facility], [place], [place]    Office Visit          Future Appointments         Provider Department Appt Notes    Tomorrow Kailash Carney MD [address], [place] follow/up/per MG    In 1 month Nieves Deluca Hematology Oncology f/u w outpt lab caf    In 2 months Kailash Carney MD [address], [employer], [place] follow/up/ap

## 2023-06-20 ENCOUNTER — OFFICE VISIT (OUTPATIENT)
Dept: GASTROENTEROLOGY | Facility: CLINIC | Age: 68
End: 2023-06-20

## 2023-06-20 ENCOUNTER — TELEPHONE (OUTPATIENT)
Dept: GASTROENTEROLOGY | Facility: CLINIC | Age: 68
End: 2023-06-20

## 2023-06-20 VITALS
SYSTOLIC BLOOD PRESSURE: 117 MMHG | DIASTOLIC BLOOD PRESSURE: 83 MMHG | HEIGHT: 75 IN | BODY MASS INDEX: 37.72 KG/M2 | HEART RATE: 75 BPM | WEIGHT: 303.38 LBS

## 2023-06-20 DIAGNOSIS — Z86.010 ENCOUNTER FOR COLONOSCOPY DUE TO HISTORY OF ADENOMATOUS COLONIC POLYPS: ICD-10-CM

## 2023-06-20 DIAGNOSIS — Z80.0 FAMILY HISTORY OF ESOPHAGEAL CANCER: ICD-10-CM

## 2023-06-20 DIAGNOSIS — Z12.11 ENCOUNTER FOR COLONOSCOPY DUE TO HISTORY OF ADENOMATOUS COLONIC POLYPS: ICD-10-CM

## 2023-06-20 DIAGNOSIS — Z12.11 SCREENING FOR COLORECTAL CANCER: Primary | ICD-10-CM

## 2023-06-20 DIAGNOSIS — Z86.010 HISTORY OF ADENOMATOUS POLYP OF COLON: ICD-10-CM

## 2023-06-20 DIAGNOSIS — Z12.12 SCREENING FOR COLORECTAL CANCER: Primary | ICD-10-CM

## 2023-06-20 DIAGNOSIS — Z12.12 SCREENING FOR COLORECTAL CANCER: ICD-10-CM

## 2023-06-20 DIAGNOSIS — K21.9 GASTROESOPHAGEAL REFLUX DISEASE, UNSPECIFIED WHETHER ESOPHAGITIS PRESENT: ICD-10-CM

## 2023-06-20 DIAGNOSIS — K21.9 GASTROESOPHAGEAL REFLUX DISEASE, UNSPECIFIED WHETHER ESOPHAGITIS PRESENT: Primary | ICD-10-CM

## 2023-06-20 DIAGNOSIS — Z12.11 SCREENING FOR COLORECTAL CANCER: ICD-10-CM

## 2023-06-20 PROCEDURE — 3008F BODY MASS INDEX DOCD: CPT | Performed by: INTERNAL MEDICINE

## 2023-06-20 PROCEDURE — 1126F AMNT PAIN NOTED NONE PRSNT: CPT | Performed by: INTERNAL MEDICINE

## 2023-06-20 PROCEDURE — 3079F DIAST BP 80-89 MM HG: CPT | Performed by: INTERNAL MEDICINE

## 2023-06-20 PROCEDURE — 3074F SYST BP LT 130 MM HG: CPT | Performed by: INTERNAL MEDICINE

## 2023-06-20 PROCEDURE — 99214 OFFICE O/P EST MOD 30 MIN: CPT | Performed by: INTERNAL MEDICINE

## 2023-06-20 RX ORDER — AMLODIPINE BESYLATE 10 MG/1
10 TABLET ORAL DAILY
COMMUNITY

## 2023-06-20 RX ORDER — OMEPRAZOLE 40 MG/1
40 CAPSULE, DELAYED RELEASE ORAL DAILY
Qty: 90 CAPSULE | Refills: 3 | Status: SHIPPED | OUTPATIENT
Start: 2023-06-20

## 2023-06-20 RX ORDER — SODIUM, POTASSIUM,MAG SULFATES 17.5-3.13G
SOLUTION, RECONSTITUTED, ORAL ORAL
Qty: 1 EACH | Refills: 0 | Status: SHIPPED | OUTPATIENT
Start: 2023-06-20

## 2023-06-20 RX ORDER — RANOLAZINE 500 MG/1
1 TABLET, EXTENDED RELEASE ORAL 2 TIMES DAILY
COMMUNITY
Start: 2023-06-17

## 2023-06-20 NOTE — TELEPHONE ENCOUNTER
Scheduled for:  Colonoscopy [phone] , [address] [facility] ,[address]   Provider Name:  Cristian Escobar  Date:  2/7/2024  Location:[facility]  Sedation:  Mac   Time:  11:45 Am (pt is aware that Emely Catalan will call the day before to confirm arrival time)     Prep: Split dose Suprep or Trilyte ,Egd -npo 3 hrs before Prep instructions were given to pt in the office, I discuss prep Instructions with patient at the time of the appointment which he verbally understood and given the prep instructions at the time of the appointment  Meds/Allergies Reconciled?:  Physician reviewed   Diagnosis with codes: Allie Kenttierra K21.9 , Colorectal cancer screening -Z12.11, Z12.12 , History of Colon Polyps -Z86.010    Was patient informed to call insurance with codes (Y/N):  Yes, I confirmed [facility]  insurance with the patient. The patient also verbally understands to call his insurance to check for pre-cert, codes were given on prep instructions. Referral sent?:  Referral was sent at the time of electronic surgical scheduling. [address] [place] [address] or [facility] notified?:Electronic case request was sent to Emely Catalan via [employer]. Medication Orders: This patient verbally confirmed that he  is not taking:   Iron, blood thinners, BP meds, and is not diabetic   Not latex allergy, Not PCN allergy and does not have a pacemaker Pt is aware to NOT take iron pills, herbal meds and diet supplements for 7 days before exam. Also to NOT take any form of alcohol, recreational drugs and any forms of ED meds 24 hours before exam.    Misc Orders:  Patient verbally understood & will await a phone call from [facility] to schedule.       Further instructions given by staff:

## 2023-06-20 NOTE — PATIENT INSTRUCTIONS
1. Schedule colonoscopy and EGD with MAC at the [facility] for February 2024    2.  bowel prep from pharmacy (split trilyte or suprep). 3. Continue all medications for procedure    4. Read all bowel prep instructions carefully    5. AVOID seeds, nuts, popcorn, raw fruits and vegetables (cooked is okay) for 2-3 days before procedure    >>>Please note: if you were prescribed a bowel prep and it is too expensive or not covered by insurance, it is okay to substitute Trilyte or Golytely (or any similar generic prep). This can be done by notifying the pharmacy or calling our office. 6. I've prescribed your omeprazole.

## 2023-07-07 RX ORDER — ALLOPURINOL 100 MG/1
100 TABLET ORAL 2 TIMES DAILY
Qty: 180 TABLET | Refills: 3 | OUTPATIENT
Start: 2023-07-07

## 2023-07-18 ENCOUNTER — PATIENT MESSAGE (OUTPATIENT)
Dept: FAMILY MEDICINE CLINIC | Facility: CLINIC | Age: 68
End: 2023-07-18

## 2023-07-18 VITALS — BODY MASS INDEX: 37.55 KG/M2 | WEIGHT: 302 LBS | HEIGHT: 75 IN

## 2023-07-18 RX ORDER — ALLOPURINOL 100 MG/1
100 TABLET ORAL DAILY
COMMUNITY

## 2023-07-18 RX ORDER — RANOLAZINE 500 MG/1
500 TABLET, EXTENDED RELEASE ORAL
COMMUNITY
Start: 2023-06-17

## 2023-07-18 RX ORDER — CETIRIZINE HYDROCHLORIDE 10 MG/1
10 TABLET ORAL
COMMUNITY

## 2023-07-18 RX ORDER — MELOXICAM 15 MG/1
1 TABLET ORAL DAILY
COMMUNITY
Start: 2023-06-19

## 2023-07-18 RX ORDER — NEBIVOLOL 10 MG/1
10 TABLET ORAL DAILY
COMMUNITY
Start: 2023-03-30

## 2023-07-18 RX ORDER — ATORVASTATIN CALCIUM 40 MG/1
40 TABLET, FILM COATED ORAL DAILY
COMMUNITY
Start: 2023-04-06

## 2023-07-18 RX ORDER — LATANOPROST 50 UG/ML
1 SOLUTION/ DROPS OPHTHALMIC NIGHTLY
COMMUNITY
Start: 2023-04-20

## 2023-07-18 RX ORDER — AMLODIPINE BESYLATE 10 MG/1
10 TABLET ORAL
COMMUNITY

## 2023-07-18 RX ORDER — OMEPRAZOLE 40 MG/1
40 CAPSULE, DELAYED RELEASE ORAL
COMMUNITY
Start: 2023-06-20

## 2023-07-18 RX ORDER — TAMSULOSIN HYDROCHLORIDE 0.4 MG/1
2 CAPSULE ORAL DAILY
COMMUNITY
Start: 2023-02-23

## 2023-07-18 ASSESSMENT — ACTIVITIES OF DAILY LIVING (ADL)
ADL_BEFORE_ADMISSION: INDEPENDENT
NEEDS_ASSIST: NO
ADL_SHORT_OF_BREATH: NO
ADL_SCORE: 12
HISTORY OF FALLING IN THE LAST YEAR (PRIOR TO ADMISSION): NO
SENSORY_SUPPORT_DEVICES: EYEGLASSES;HEARING AIDS

## 2023-07-27 ENCOUNTER — LAB ENCOUNTER (OUTPATIENT)
Dept: LAB | Facility: HOSPITAL | Age: 68
End: 2023-07-27
Attending: INTERNAL MEDICINE
Payer: MEDICARE

## 2023-07-27 DIAGNOSIS — C83.00 SMALL LYMPHOCYTIC LYMPHOMA (HCC): ICD-10-CM

## 2023-07-27 DIAGNOSIS — C67.9 MALIGNANT NEOPLASM OF URINARY BLADDER, UNSPECIFIED SITE (HCC): ICD-10-CM

## 2023-07-27 LAB
ALBUMIN SERPL-MCNC: 3.5 G/DL (ref 3.4–5)
ALBUMIN/GLOB SERPL: 1 {RATIO} (ref 1–2)
ALP LIVER SERPL-CCNC: 71 U/L
ALT SERPL-CCNC: 36 U/L
ANION GAP SERPL CALC-SCNC: 7 MMOL/L (ref 0–18)
AST SERPL-CCNC: 26 U/L (ref 15–37)
BASOPHILS # BLD AUTO: 0.07 X10(3) UL (ref 0–0.2)
BASOPHILS NFR BLD AUTO: 0.8 %
BILIRUB SERPL-MCNC: 0.5 MG/DL (ref 0.1–2)
BUN BLD-MCNC: 19 MG/DL (ref 7–18)
BUN/CREAT SERPL: 17.1 (ref 10–20)
CALCIUM BLD-MCNC: 9 MG/DL (ref 8.5–10.1)
CHLORIDE SERPL-SCNC: 105 MMOL/L (ref 98–112)
CO2 SERPL-SCNC: 28 MMOL/L (ref 21–32)
CREAT BLD-MCNC: 1.11 MG/DL
DEPRECATED RDW RBC AUTO: 49.3 FL (ref 35.1–46.3)
EGFRCR SERPLBLD CKD-EPI 2021: 72 ML/MIN/1.73M2 (ref 60–?)
EOSINOPHIL # BLD AUTO: 0.12 X10(3) UL (ref 0–0.7)
EOSINOPHIL NFR BLD AUTO: 1.3 %
ERYTHROCYTE [DISTWIDTH] IN BLOOD BY AUTOMATED COUNT: 13.2 % (ref 11–15)
FASTING STATUS PATIENT QL REPORTED: NO
GLOBULIN PLAS-MCNC: 3.4 G/DL (ref 2.8–4.4)
GLUCOSE BLD-MCNC: 96 MG/DL (ref 70–99)
HCT VFR BLD AUTO: 47.9 %
HGB BLD-MCNC: 16.1 G/DL
IMM GRANULOCYTES # BLD AUTO: 0.05 X10(3) UL (ref 0–1)
IMM GRANULOCYTES NFR BLD: 0.6 %
LDH SERPL L TO P-CCNC: 210 U/L
LYMPHOCYTES # BLD AUTO: 1.64 X10(3) UL (ref 1–4)
LYMPHOCYTES NFR BLD AUTO: 18.4 %
MCH RBC QN AUTO: 34.3 PG (ref 26–34)
MCHC RBC AUTO-ENTMCNC: 33.6 G/DL (ref 31–37)
MCV RBC AUTO: 102.1 FL
MONOCYTES # BLD AUTO: 1 X10(3) UL (ref 0.1–1)
MONOCYTES NFR BLD AUTO: 11.2 %
NEUTROPHILS # BLD AUTO: 6.02 X10 (3) UL (ref 1.5–7.7)
NEUTROPHILS # BLD AUTO: 6.02 X10(3) UL (ref 1.5–7.7)
NEUTROPHILS NFR BLD AUTO: 67.7 %
OSMOLALITY SERPL CALC.SUM OF ELEC: 292 MOSM/KG (ref 275–295)
PLATELET # BLD AUTO: 250 10(3)UL (ref 150–450)
POTASSIUM SERPL-SCNC: 4 MMOL/L (ref 3.5–5.1)
PROT SERPL-MCNC: 6.9 G/DL (ref 6.4–8.2)
RBC # BLD AUTO: 4.69 X10(6)UL
SODIUM SERPL-SCNC: 140 MMOL/L (ref 136–145)
WBC # BLD AUTO: 8.9 X10(3) UL (ref 4–11)

## 2023-07-27 PROCEDURE — 80053 COMPREHEN METABOLIC PANEL: CPT

## 2023-07-27 PROCEDURE — 36415 COLL VENOUS BLD VENIPUNCTURE: CPT

## 2023-07-27 PROCEDURE — 85025 COMPLETE CBC W/AUTO DIFF WBC: CPT

## 2023-07-27 PROCEDURE — 83615 LACTATE (LD) (LDH) ENZYME: CPT

## 2023-08-07 ENCOUNTER — MED REC SCAN ONLY (OUTPATIENT)
Dept: FAMILY MEDICINE CLINIC | Facility: CLINIC | Age: 68
End: 2023-08-07

## 2023-08-07 ENCOUNTER — OFFICE VISIT (OUTPATIENT)
Dept: HEMATOLOGY/ONCOLOGY | Facility: HOSPITAL | Age: 68
End: 2023-08-07
Attending: INTERNAL MEDICINE
Payer: MEDICARE

## 2023-08-07 VITALS
WEIGHT: 301.38 LBS | HEIGHT: 75 IN | BODY MASS INDEX: 37.47 KG/M2 | SYSTOLIC BLOOD PRESSURE: 125 MMHG | RESPIRATION RATE: 18 BRPM | DIASTOLIC BLOOD PRESSURE: 78 MMHG | OXYGEN SATURATION: 97 % | HEART RATE: 71 BPM | TEMPERATURE: 99 F

## 2023-08-07 DIAGNOSIS — R91.1 PULMONARY NODULE: ICD-10-CM

## 2023-08-07 DIAGNOSIS — C83.00 SMALL LYMPHOCYTIC LYMPHOMA (HCC): Primary | ICD-10-CM

## 2023-08-07 PROCEDURE — 99211 OFF/OP EST MAY X REQ PHY/QHP: CPT

## 2023-09-20 ENCOUNTER — MED REC SCAN ONLY (OUTPATIENT)
Dept: FAMILY MEDICINE CLINIC | Facility: CLINIC | Age: 68
End: 2023-09-20

## 2023-10-16 ENCOUNTER — MED REC SCAN ONLY (OUTPATIENT)
Dept: FAMILY MEDICINE CLINIC | Facility: CLINIC | Age: 68
End: 2023-10-16

## 2023-10-20 NOTE — TELEPHONE ENCOUNTER
Reached patient for medication adherence consult. Per insurance report, patient is past due for refill on atorvastatin. Patient tells me he is no longer taking the atorvastatin. This was stopped by the cardiologist due to side effects and he was started on ranolazine. He tells me he does have an appt with cardiologist next week Thursday. Patient is requesting assistance in getting a new prescription of his allopurinol sent over to [facility]. He tells me his urologist had been sending this over but is now retired so he is wondering if PCP will start prescribing. He has been using allopurinol for years for gout. Will pend new script for PCP review. Patient denies any other questions or concerns with medications at this time.

## 2023-10-21 RX ORDER — ALLOPURINOL 100 MG/1
100 TABLET ORAL 2 TIMES DAILY
Qty: 180 TABLET | Refills: 3 | Status: SHIPPED | OUTPATIENT
Start: 2023-10-21

## 2023-12-13 ENCOUNTER — MED REC SCAN ONLY (OUTPATIENT)
Dept: FAMILY MEDICINE CLINIC | Facility: CLINIC | Age: 68
End: 2023-12-13

## 2024-01-31 PROBLEM — J45.909 ASTHMA (HCC): Status: ACTIVE | Noted: 2022-07-18

## 2024-01-31 PROBLEM — R60.9 EDEMA: Status: ACTIVE | Noted: 2023-07-27

## 2024-01-31 PROBLEM — C85.90 MALIGNANT LYMPHOMA (HCC): Status: ACTIVE | Noted: 2022-07-18

## 2024-01-31 PROBLEM — I10 HYPERTENSIVE DISORDER: Status: ACTIVE | Noted: 2023-05-25

## 2024-01-31 PROBLEM — G47.34 IDIOPATHIC SLEEP RELATED NONOBSTRUCTIVE ALVEOLAR HYPOVENTILATION: Status: ACTIVE | Noted: 2024-01-31

## 2024-01-31 PROBLEM — R07.9 CHEST PAIN: Status: ACTIVE | Noted: 2023-04-06

## 2024-01-31 PROBLEM — J45.909 ASTHMA: Status: ACTIVE | Noted: 2022-07-18

## 2024-01-31 PROBLEM — N62 GYNECOMASTIA: Status: ACTIVE | Noted: 2022-07-18

## 2024-02-01 ENCOUNTER — LAB ENCOUNTER (OUTPATIENT)
Dept: LAB | Facility: HOSPITAL | Age: 69
End: 2024-02-01
Attending: INTERNAL MEDICINE
Payer: MEDICARE

## 2024-02-01 DIAGNOSIS — C83.00 SMALL LYMPHOCYTIC LYMPHOMA (HCC): ICD-10-CM

## 2024-02-01 LAB
ALBUMIN SERPL-MCNC: 4.3 G/DL (ref 3.2–4.8)
ALBUMIN/GLOB SERPL: 1.7 {RATIO} (ref 1–2)
ALP LIVER SERPL-CCNC: 64 U/L
ALT SERPL-CCNC: 24 U/L
ANION GAP SERPL CALC-SCNC: 8 MMOL/L (ref 0–18)
AST SERPL-CCNC: 18 U/L (ref ?–34)
BASOPHILS # BLD AUTO: 0.08 X10(3) UL (ref 0–0.2)
BASOPHILS NFR BLD AUTO: 1.2 %
BILIRUB SERPL-MCNC: 0.7 MG/DL (ref 0.2–1.1)
BUN BLD-MCNC: 18 MG/DL (ref 9–23)
BUN/CREAT SERPL: 18.8 (ref 10–20)
CALCIUM BLD-MCNC: 9.1 MG/DL (ref 8.7–10.4)
CHLORIDE SERPL-SCNC: 105 MMOL/L (ref 98–112)
CO2 SERPL-SCNC: 27 MMOL/L (ref 21–32)
CREAT BLD-MCNC: 0.96 MG/DL
DEPRECATED RDW RBC AUTO: 47.9 FL (ref 35.1–46.3)
EGFRCR SERPLBLD CKD-EPI 2021: 86 ML/MIN/1.73M2 (ref 60–?)
EOSINOPHIL # BLD AUTO: 0.07 X10(3) UL (ref 0–0.7)
EOSINOPHIL NFR BLD AUTO: 1 %
ERYTHROCYTE [DISTWIDTH] IN BLOOD BY AUTOMATED COUNT: 13 % (ref 11–15)
FASTING STATUS PATIENT QL REPORTED: NO
GLOBULIN PLAS-MCNC: 2.6 G/DL (ref 2.8–4.4)
GLUCOSE BLD-MCNC: 103 MG/DL (ref 70–99)
HCT VFR BLD AUTO: 47.8 %
HGB BLD-MCNC: 16.3 G/DL
IMM GRANULOCYTES # BLD AUTO: 0.03 X10(3) UL (ref 0–1)
IMM GRANULOCYTES NFR BLD: 0.4 %
LDH SERPL L TO P-CCNC: 189 U/L
LYMPHOCYTES # BLD AUTO: 2.22 X10(3) UL (ref 1–4)
LYMPHOCYTES NFR BLD AUTO: 33.2 %
MCH RBC QN AUTO: 34 PG (ref 26–34)
MCHC RBC AUTO-ENTMCNC: 34.1 G/DL (ref 31–37)
MCV RBC AUTO: 99.8 FL
MONOCYTES # BLD AUTO: 0.76 X10(3) UL (ref 0.1–1)
MONOCYTES NFR BLD AUTO: 11.4 %
NEUTROPHILS # BLD AUTO: 3.53 X10 (3) UL (ref 1.5–7.7)
NEUTROPHILS # BLD AUTO: 3.53 X10(3) UL (ref 1.5–7.7)
NEUTROPHILS NFR BLD AUTO: 52.8 %
OSMOLALITY SERPL CALC.SUM OF ELEC: 292 MOSM/KG (ref 275–295)
PLATELET # BLD AUTO: 243 10(3)UL (ref 150–450)
POTASSIUM SERPL-SCNC: 4.5 MMOL/L (ref 3.5–5.1)
PROT SERPL-MCNC: 6.9 G/DL (ref 5.7–8.2)
RBC # BLD AUTO: 4.79 X10(6)UL
SODIUM SERPL-SCNC: 140 MMOL/L (ref 136–145)
WBC # BLD AUTO: 6.7 X10(3) UL (ref 4–11)

## 2024-02-01 PROCEDURE — 85025 COMPLETE CBC W/AUTO DIFF WBC: CPT

## 2024-02-01 PROCEDURE — 80053 COMPREHEN METABOLIC PANEL: CPT

## 2024-02-01 PROCEDURE — 36415 COLL VENOUS BLD VENIPUNCTURE: CPT

## 2024-02-01 PROCEDURE — 83615 LACTATE (LD) (LDH) ENZYME: CPT

## 2024-02-07 ENCOUNTER — APPOINTMENT (OUTPATIENT)
Dept: HEMATOLOGY/ONCOLOGY | Facility: HOSPITAL | Age: 69
End: 2024-02-07
Attending: INTERNAL MEDICINE
Payer: MEDICARE

## 2024-02-08 ENCOUNTER — TELEPHONE (OUTPATIENT)
Facility: CLINIC | Age: 69
End: 2024-02-08

## 2024-02-08 NOTE — TELEPHONE ENCOUNTER
----- Message from Joshua Mohan MD sent at 2/8/2024  3:52 PM CST -----  GI staff: please place recall for colonoscopy in 3 years     Thanks    Joshua Mohan MD  [facility] - Gastroenterology  2/8/2024  3:52 PM

## 2024-02-08 NOTE — TELEPHONE ENCOUNTER
Recall colonoscopy for 3 years per Dr. LUIS Mohan.  Colon done 2/7/2024  Health maintenance updated and message sent to pt outreach to repeat colonoscopy in 3 years

## 2024-02-12 ENCOUNTER — OFFICE VISIT (OUTPATIENT)
Dept: HEMATOLOGY/ONCOLOGY | Facility: HOSPITAL | Age: 69
End: 2024-02-12
Attending: INTERNAL MEDICINE
Payer: MEDICARE

## 2024-02-12 VITALS
OXYGEN SATURATION: 97 % | BODY MASS INDEX: 37.05 KG/M2 | SYSTOLIC BLOOD PRESSURE: 122 MMHG | TEMPERATURE: 99 F | DIASTOLIC BLOOD PRESSURE: 66 MMHG | HEIGHT: 75 IN | HEART RATE: 66 BPM | WEIGHT: 298 LBS | RESPIRATION RATE: 18 BRPM

## 2024-02-12 DIAGNOSIS — C83.00 SMALL LYMPHOCYTIC LYMPHOMA (HCC): Primary | ICD-10-CM

## 2024-02-12 DIAGNOSIS — R91.1 PULMONARY NODULE: ICD-10-CM

## 2024-02-12 LAB — VIT B12 SERPL-MCNC: 203 PG/ML (ref 211–911)

## 2024-02-12 PROCEDURE — 99214 OFFICE O/P EST MOD 30 MIN: CPT | Performed by: INTERNAL MEDICINE

## 2024-02-12 NOTE — PROGRESS NOTES
Cancer Center Progress Note    Patient Name: Sean Zheng   YOB: 1955   Medical Record Number: [id number]   Attending Physician: Yan Palacios M.D.     Chief Complaint:   Small lymphocytic lymphoma    History of Present Illness:  68 year old   male with a history of low-grade non-muscle invasive bladder cancer, being evaluated by Medical Oncology for small lymphocytic lymphoma.  Diagnosis was made 02/01/2021, with endoscopic ultrasound fine-needle aspiration of a periesophageal lymph node with the procedure performed by Dr. Joshua Mohan.  This showed a CD-positive B-cell lymphoproliferative disorder with immunophenotype consistent with small lymphocytic lymphoma/chronic lymphocytic leukemia.  The patient does have adenopathy above and below the diaphragm.  He had a CT urogram 12/30/2020, that showed paraesophageal, mesenteric, retroperitoneal, and pelvic lymphadenopathy measuring from just over 1 to up to 3 cm.  Soft tissue of the neck, 01/06/2020, showed lymphadenopathy with nodes, the majority less than 2 cm.  He did have some soft tissue nodularity at the base of the tongue.  CT of the chest 01/15/2020, showed right paraesophageal lymph node measuring 2.6 cm.  He also had bilateral noncalcified lung nodules measuring up to 1 cm.  These were stable from previous exams.    Given he did not have any symptoms related to his lymphadenopathy has been followed on surveillance    Interval history:  The patient returns for outpatient follow-up is doing reasonable overall.  Denies any fever chills night sweats unintentional weight loss denies any new areas of adenopathy.  He is active and up in his activities of daily living        Performance Status:  ECOG 0  Past Medical History:  Past Medical History:   Diagnosis Date    Asthma     Bladder cancer (HCC) 2017    surgery only     Calculus of kidney     Colon adenomas 02/01/2021    #3    Colon adenomas 02/07/2024    x3    Colon polyps     GERD  (gastroesophageal reflux disease)     Gynecomastia 2014    right    Hearing impairment     left ear, hearing aid    High blood pressure     High cholesterol     Hypercholesterolemia 09/10/2020    Hyperlipidemia     Hypertensive disorder 2023    Kidney stone     Lymphoma (HCC)     Dr. Segovia oncologist    Obesity     Osteoarthritis     Sleep apnea     wears mask at night    Urethral stricture     Visual impairment     glasses       Past Surgical History:  Past Surgical History:   Procedure Laterality Date    ANGIOPLASTY (CORONARY)  May 4 2023    All clear    [facility]    [facility]  2014    [facility]    COLONOSCOPY  2018    COLONOSCOPY N/A 2021    Procedure: COLONOSCOPY;  Surgeon: Joshua Mohan MD;  Location: [facility] ENDOSCOPY    COLONOSCOPY  2024    CYSTOURETERO W/EXCISE TUMOR Left 2017    with stent and urethral dilitation    EGD  2018    EGD  2024    ENDOSCOPIC ULTRASOUND - INTERNAL  2021    EXCISION TURBINATE,SUBMUCOUS      HERNIA SURGERY  1999    INGUINAL HERNIA REPAIR Right     KNEE ARTHROSCOPY Left     KNEE SURGERY Right     [facility]    LIPOMA REMOVAL Left 2019    forearm    LITHOTRIPSY  2017    OTHER SURGICAL HISTORY      REPAIR OF NASAL SEPTUM      SKIN SURGERY  2016    TONSILLECTOMY  1964       Family History:  Family History   Problem Relation Age of Onset    Cancer Father 70         of cancer    Diabetes Father     Obesity Father     Cancer Mother 70        breast    Breast Cancer Mother 70    Other (Other) Sister 60        lupus    Cancer Brother     Cancer Maternal Grandmother          of cancer    Cancer Sister         has lupus    Cancer Brother          of cancer       Social History:  Social History     Socioeconomic History    Marital status:     Number of children: 1   Occupational History    Occupation:       Employer: [employer]     Comment: retired    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   Tobacco Use    Smoking status: Never    Smokeless tobacco: Never    Tobacco comments:     Occasional cigar   Vaping Use    Vaping Use: Never used   Substance and Sexual Activity    Alcohol use: Yes     Alcohol/week: 10.0 standard drinks of alcohol     Types: 10 Standard drinks or equivalent per week     Comment: occasionally    Drug use: No         Current Medications:    Current Outpatient Medications:     Azelastine HCl 137 MCG/SPRAY Nasal Solution, , Disp: , Rfl:     Ranolazine ER 1000 MG Oral Tablet 12 Hr, , Disp: , Rfl:     allopurinol 100 MG Oral Tab, Take 1 tablet (100 mg total) by mouth 2 (two) times daily., Disp: 180 tablet, Rfl: 3    Cholecalciferol (D3 2000) 50 MCG (2000 UT) Oral Cap, Take 2,000 Units by mouth., Disp: , Rfl:     montelukast 10 MG Oral Tab, Take 1 tablet (10 mg total) by mouth daily., Disp: , Rfl:     amLODIPine 10 MG Oral Tab, Take 1 tablet (10 mg total) by mouth daily., Disp: , Rfl:     MELOXICAM 15 MG Oral Tab, TAKE 1 TABLET BY MOUTH DAILY, Disp: 90 tablet, Rfl: 3    latanoprost 0.005 % Ophthalmic Solution, Place 1 drop into both eyes nightly., Disp: , Rfl:     TAMSULOSIN 0.4 MG Oral Cap, TAKE 2 CAPSULES BY MOUTH  DAILY (Patient taking differently: Taking 1 cap daily), Disp: 180 capsule, Rfl: 3    nebivolol 10 MG Oral Tab, Take 1 tablet (10 mg total) by mouth daily., Disp: , Rfl:     fluticasone propionate 50 MCG/ACT Nasal Suspension, 2 sprays by Nasal route daily., Disp: 16 g, Rfl: 3    Polyethyl Glycol-Propyl Glycol (SYSTANE OP), Apply to eye., Disp: , Rfl:     cetirizine 10 MG Oral Tab, Take 1 tablet (10 mg total) by mouth daily., Disp: , Rfl:     Current Outpatient Medications on File Prior to Visit   Medication Sig Dispense Refill    Azelastine HCl 137 MCG/SPRAY Nasal Solution       Ranolazine ER 1000 MG Oral Tablet 12 Hr       allopurinol 100 MG Oral Tab Take 1 tablet (100 mg  total) by mouth 2 (two) times daily. 180 tablet 3    Cholecalciferol (D3 2000) 50 MCG (2000 UT) Oral Cap Take 2,000 Units by mouth.      montelukast 10 MG Oral Tab Take 1 tablet (10 mg total) by mouth daily.      amLODIPine 10 MG Oral Tab Take 1 tablet (10 mg total) by mouth daily.      MELOXICAM 15 MG Oral Tab TAKE 1 TABLET BY MOUTH DAILY 90 tablet 3    latanoprost 0.005 % Ophthalmic Solution Place 1 drop into both eyes nightly.      TAMSULOSIN 0.4 MG Oral Cap TAKE 2 CAPSULES BY MOUTH  DAILY (Patient taking differently: Taking 1 cap daily) 180 capsule 3    nebivolol 10 MG Oral Tab Take 1 tablet (10 mg total) by mouth daily.      fluticasone propionate 50 MCG/ACT Nasal Suspension 2 sprays by Nasal route daily. 16 g 3    Polyethyl Glycol-Propyl Glycol (SYSTANE OP) Apply to eye.      cetirizine 10 MG Oral Tab Take 1 tablet (10 mg total) by mouth daily.       Current Facility-Administered Medications on File Prior to Visit   Medication Dose Route Frequency Provider Last Rate Last Admin    ciprofloxacin (CIPRO) tab 500 mg  500 mg Oral Once Trudi Merrill MD             Allergies:  No Known Allergies     Review of Systems:  All other systems reviewed and negative x12    Vital Signs:  /66 (BP Location: Left arm, Patient Position: Sitting, Cuff Size: large)   Pulse 66   Temp 98.8 °F (37.1 °C) (Oral)   Resp 18   Ht 1.905 m (6' 3\")   Wt 135.2 kg (298 lb)   SpO2 97%   BMI 37.25 kg/m²     Physical Examination:  General: Patient is alert and oriented x 3, not in acute distress.  Psych:  Mood and affect appropriate  HEENT: EOMs intact. PERRL. Oropharynx is clear.   Neck: No JVD. No palpable lymphadenopathy. Neck is supple.  Lymphatics: There is no palpable peripheral lymphadenopathy   Chest: Symmetric expansion, nonlabored breathing  Cardiovascular: Regular with palpable distal pulses   Abdomen: Soft, non tender.   Extremities: No edema.  Neurological: 5/5 motor x4.        Laboratory:  WBC: 6.7, done on  2/1/2024.  HGB: 16.3, done on 2/1/2024.  PLT: 243, done on 2/1/2024.       Lab Results   Component Value Date     (H) 02/01/2024    BUN 18 02/01/2024    BUNCREA 18.8 02/01/2024    CREATSERUM 0.96 02/01/2024    ANIONGAP 8 02/01/2024    GFRNAA 78 05/22/2022    GFRAA 90 05/22/2022    CA 9.1 02/01/2024    OSMOCALC 292 02/01/2024    ALKPHO 64 02/01/2024    AST 18 02/01/2024    ALT 24 02/01/2024    ALKPHOS 54 09/26/2016    BILT 0.7 02/01/2024    TP 6.9 02/01/2024    ALB 4.3 02/01/2024    GLOBULIN 2.6 (L) 02/01/2024    AGRATIO 1.3 09/26/2016     02/01/2024    K 4.5 02/01/2024     02/01/2024    CO2 27.0 02/01/2024       Radiology:         Cancer Staging   No matching staging information was found for the patient.      Impression and Plan:  68 year old   male with a history of low-grade non-muscle invasive bladder cancer, being evaluated by Medical Oncology for small lymphocytic lymphoma.  Diagnosis was made 02/01/2021, with endoscopic ultrasound fine-needle aspiration of a periesophageal lymph node with the procedure performed by Dr. Joshua Mohan.  He also has a history of pulmonary nodules  -He currently does not have an indication for treatment.  -Pulmonary nodules stable CT May 2022 no further imagins  -Continue observation return to clinic in 4 to 6 months    Data review: Moderate review of CBC CMP LDH    Yan Palacios MD

## 2024-02-15 ENCOUNTER — TELEPHONE (OUTPATIENT)
Dept: HEMATOLOGY/ONCOLOGY | Facility: HOSPITAL | Age: 69
End: 2024-02-15

## 2024-02-15 NOTE — TELEPHONE ENCOUNTER
Called Sean to discuss Vitamin B12 lab Result. Per Dr. Palacios, he has two options. Monthly Vitamin B 12 injections or daily Vitamin B 12 pills. He would like to take daily pills. Instructed him to take Vitamin B 12 1000 mcg per Dr. Paalcios, he can get over the counter. He voiced understanding. He thanked me for the call.

## 2024-02-21 RX ORDER — PANTOPRAZOLE SODIUM 40 MG/1
40 TABLET, DELAYED RELEASE ORAL
Qty: 30 TABLET | Refills: 11 | OUTPATIENT
Start: 2024-02-21

## 2024-03-18 ENCOUNTER — TELEPHONE (OUTPATIENT)
Dept: FAMILY MEDICINE CLINIC | Facility: CLINIC | Age: 69
End: 2024-03-18

## 2024-03-18 NOTE — TELEPHONE ENCOUNTER
Spoke with patient today to make an appointment for annual medicare physical patient said he is having surgery in the near future so unable to make appointment at this time

## 2024-05-06 ENCOUNTER — TELEPHONE (OUTPATIENT)
Dept: FAMILY MEDICINE CLINIC | Facility: CLINIC | Age: 69
End: 2024-05-06

## 2024-05-08 ENCOUNTER — LAB ENCOUNTER (OUTPATIENT)
Dept: LAB | Facility: HOSPITAL | Age: 69
End: 2024-05-08
Attending: INTERNAL MEDICINE
Payer: MEDICARE

## 2024-05-08 DIAGNOSIS — E66.9 CLASS 2 OBESITY WITHOUT SERIOUS COMORBIDITY WITH BODY MASS INDEX (BMI) OF 37.0 TO 37.9 IN ADULT, UNSPECIFIED OBESITY TYPE: ICD-10-CM

## 2024-05-08 DIAGNOSIS — E55.9 VITAMIN D DEFICIENCY: ICD-10-CM

## 2024-05-08 DIAGNOSIS — Z12.5 PROSTATE CANCER SCREENING: ICD-10-CM

## 2024-05-08 DIAGNOSIS — E53.8 VITAMIN B12 DEFICIENCY: ICD-10-CM

## 2024-05-08 DIAGNOSIS — C83.00 SMALL LYMPHOCYTIC LYMPHOMA (HCC): ICD-10-CM

## 2024-05-08 DIAGNOSIS — R31.29 MICROHEMATURIA: ICD-10-CM

## 2024-05-08 DIAGNOSIS — E78.00 HYPERCHOLESTEROLEMIA: ICD-10-CM

## 2024-05-08 DIAGNOSIS — E53.8 VITAMIN B 12 DEFICIENCY: ICD-10-CM

## 2024-05-08 DIAGNOSIS — M1A.09X0 CHRONIC GOUT OF MULTIPLE SITES, UNSPECIFIED CAUSE: ICD-10-CM

## 2024-05-08 LAB
ALBUMIN SERPL-MCNC: 4.2 G/DL (ref 3.2–4.8)
ALBUMIN/GLOB SERPL: 1.7 {RATIO} (ref 1–2)
ALP LIVER SERPL-CCNC: 62 U/L
ALT SERPL-CCNC: 29 U/L
ANION GAP SERPL CALC-SCNC: 5 MMOL/L (ref 0–18)
AST SERPL-CCNC: 18 U/L (ref ?–34)
BASOPHILS # BLD AUTO: 0.05 X10(3) UL (ref 0–0.2)
BASOPHILS NFR BLD AUTO: 0.6 %
BILIRUB SERPL-MCNC: 1 MG/DL (ref 0.2–1.1)
BUN BLD-MCNC: 13 MG/DL (ref 9–23)
BUN/CREAT SERPL: 17.1 (ref 10–20)
CALCIUM BLD-MCNC: 9.2 MG/DL (ref 8.7–10.4)
CHLORIDE SERPL-SCNC: 108 MMOL/L (ref 98–112)
CHOLEST SERPL-MCNC: 159 MG/DL (ref ?–200)
CO2 SERPL-SCNC: 26 MMOL/L (ref 21–32)
COMPLEXED PSA SERPL-MCNC: 1.53 NG/ML (ref ?–4)
CREAT BLD-MCNC: 0.76 MG/DL
DEPRECATED RDW RBC AUTO: 49.4 FL (ref 35.1–46.3)
EGFRCR SERPLBLD CKD-EPI 2021: 97 ML/MIN/1.73M2 (ref 60–?)
EOSINOPHIL # BLD AUTO: 0.07 X10(3) UL (ref 0–0.7)
EOSINOPHIL NFR BLD AUTO: 0.9 %
ERYTHROCYTE [DISTWIDTH] IN BLOOD BY AUTOMATED COUNT: 13.4 % (ref 11–15)
FASTING PATIENT LIPID ANSWER: YES
FASTING STATUS PATIENT QL REPORTED: YES
GLOBULIN PLAS-MCNC: 2.5 G/DL (ref 2–3.5)
GLUCOSE BLD-MCNC: 101 MG/DL (ref 70–99)
HCT VFR BLD AUTO: 43.5 %
HDLC SERPL-MCNC: 62 MG/DL (ref 40–59)
HGB BLD-MCNC: 14.8 G/DL
IMM GRANULOCYTES # BLD AUTO: 0.07 X10(3) UL (ref 0–1)
IMM GRANULOCYTES NFR BLD: 0.9 %
LDLC SERPL CALC-MCNC: 73 MG/DL (ref ?–100)
LYMPHOCYTES # BLD AUTO: 2.55 X10(3) UL (ref 1–4)
LYMPHOCYTES NFR BLD AUTO: 31.6 %
MCH RBC QN AUTO: 33.9 PG (ref 26–34)
MCHC RBC AUTO-ENTMCNC: 34 G/DL (ref 31–37)
MCV RBC AUTO: 99.5 FL
MONOCYTES # BLD AUTO: 0.66 X10(3) UL (ref 0.1–1)
MONOCYTES NFR BLD AUTO: 8.2 %
NEUTROPHILS # BLD AUTO: 4.67 X10 (3) UL (ref 1.5–7.7)
NEUTROPHILS # BLD AUTO: 4.67 X10(3) UL (ref 1.5–7.7)
NEUTROPHILS NFR BLD AUTO: 57.8 %
NONHDLC SERPL-MCNC: 97 MG/DL (ref ?–130)
OSMOLALITY SERPL CALC.SUM OF ELEC: 288 MOSM/KG (ref 275–295)
PLATELET # BLD AUTO: 200 10(3)UL (ref 150–450)
POTASSIUM SERPL-SCNC: 3.9 MMOL/L (ref 3.5–5.1)
PROT SERPL-MCNC: 6.7 G/DL (ref 5.7–8.2)
RBC # BLD AUTO: 4.37 X10(6)UL
SODIUM SERPL-SCNC: 139 MMOL/L (ref 136–145)
TRIGL SERPL-MCNC: 142 MG/DL (ref 30–149)
TSI SER-ACNC: 2.34 MIU/ML (ref 0.55–4.78)
VIT B12 SERPL-MCNC: 535 PG/ML (ref 211–911)
VIT D+METAB SERPL-MCNC: 46.2 NG/ML (ref 30–100)
VLDLC SERPL CALC-MCNC: 22 MG/DL (ref 0–30)
WBC # BLD AUTO: 8.1 X10(3) UL (ref 4–11)

## 2024-05-08 PROCEDURE — 80053 COMPREHEN METABOLIC PANEL: CPT

## 2024-05-08 PROCEDURE — 36415 COLL VENOUS BLD VENIPUNCTURE: CPT

## 2024-05-08 PROCEDURE — 80061 LIPID PANEL: CPT

## 2024-05-08 PROCEDURE — 83921 ORGANIC ACID SINGLE QUANT: CPT

## 2024-05-08 PROCEDURE — 82607 VITAMIN B-12: CPT

## 2024-05-08 PROCEDURE — 82306 VITAMIN D 25 HYDROXY: CPT

## 2024-05-08 PROCEDURE — 85025 COMPLETE CBC W/AUTO DIFF WBC: CPT

## 2024-05-08 PROCEDURE — 84443 ASSAY THYROID STIM HORMONE: CPT

## 2024-05-13 ENCOUNTER — LAB ENCOUNTER (OUTPATIENT)
Dept: LAB | Facility: HOSPITAL | Age: 69
End: 2024-05-13

## 2024-05-13 DIAGNOSIS — M1A.09X0 CHRONIC GOUT OF MULTIPLE SITES, UNSPECIFIED CAUSE: ICD-10-CM

## 2024-05-13 LAB
BILIRUB UR QL: NEGATIVE
CLARITY UR: CLEAR
GLUCOSE UR-MCNC: NORMAL MG/DL
HGB UR QL STRIP.AUTO: NEGATIVE
KETONES UR-MCNC: NEGATIVE MG/DL
LEUKOCYTE ESTERASE UR QL STRIP.AUTO: NEGATIVE
METHYLMALONIC ACID: 118 NMOL/L
NITRITE UR QL STRIP.AUTO: NEGATIVE
PH UR: 6.5 [PH] (ref 5–8)
PROT UR-MCNC: NEGATIVE MG/DL
SP GR UR STRIP: 1.01 (ref 1–1.03)
URATE UR-MCNC: 23.1 MG/DL
UROBILINOGEN UR STRIP-ACNC: NORMAL

## 2024-05-13 PROCEDURE — 84560 ASSAY OF URINE/URIC ACID: CPT

## 2024-05-13 PROCEDURE — 81003 URINALYSIS AUTO W/O SCOPE: CPT

## 2024-05-14 ENCOUNTER — TELEMEDICINE (OUTPATIENT)
Dept: FAMILY MEDICINE CLINIC | Facility: CLINIC | Age: 69
End: 2024-05-14

## 2024-05-14 DIAGNOSIS — R42 DIZZINESS: Primary | ICD-10-CM

## 2024-05-14 DIAGNOSIS — R06.00 DYSPNEA, UNSPECIFIED TYPE: ICD-10-CM

## 2024-05-14 PROCEDURE — 99213 OFFICE O/P EST LOW 20 MIN: CPT | Performed by: FAMILY MEDICINE

## 2024-05-14 NOTE — PROGRESS NOTES
This is a telemedicine visit with live, interactive video and audio.     Patient understands and accepts financial responsibility for any deductible, co-insurance and/or co-pays associated with this service.    SUBJECTIVE  Patient is struggling with shortness of breath and dizziness. Not experiencing these issues at the same time. Has seen cardiology in the past and sees his oncologist - hematologist.  Testing has been negative so far.  He has concerns for diabetes.      HISTORY:  Past Medical History:    Asthma (HCC)    Bladder cancer (HCC)    surgery only     Calculus of kidney    Colon adenomas    #3    Colon adenomas    x3    Colon polyps    GERD (gastroesophageal reflux disease)    Gynecomastia    right    Hearing impairment    left ear, hearing aid    High blood pressure    High cholesterol    Hypercholesterolemia    Hyperlipidemia    Hypertensive disorder    Kidney stone    Lymphoma (HCC)    Dr. Segovia oncologist    Obesity    Osteoarthritis    Sleep apnea    wears mask at night    Urethral stricture    Visual impairment    glasses      Past Surgical History:   Procedure Laterality Date    Angioplasty (coronary)  May 4 2023    All clear    Breast biopsy Right 08/01/2014    [facility]    Colonoscopy  07/11/2014    [facility]    Colonoscopy  03/20/2018    Colonoscopy N/A 02/01/2021    Procedure: COLONOSCOPY;  Surgeon: Joshua Mohan MD;  Location: [facility] ENDOSCOPY    Colonoscopy  02/07/2024    Cystouretero w/excise tumor Left 09/2017    with stent and urethral dilitation    Egd  03/20/2018    Egd  02/07/2024    Endoscopic ultrasound - internal  02/01/2021    Excision turbinate,submucous      Hernia surgery  1999    Inguinal hernia repair Right 1999    Knee arthroscopy Left 2007    Knee surgery Right     Lipoma removal Bilateral 08/01/2014    thighs, [facility]    Lipoma removal Left 2019    forearm    Lithotripsy  09/2017    Other surgical history      Repair of nasal septum      Skin surgery  2016     Tonsillectomy  1964      Family History   Problem Relation Age of Onset    Cancer Father 70         of cancer    Diabetes Father     Obesity Father     Cancer Mother 70        breast    Breast Cancer Mother 70    Other (Other) Sister 60        lupus    Cancer Brother     Cancer Maternal Grandmother          of cancer    Cancer Sister         has lupus    Cancer Brother          of cancer      Social History     Socioeconomic History    Marital status:     Number of children: 1   Occupational History    Occupation:      Employer: [employer]     Comment: retired    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, [facility]    Social Connections    Received from [facility], [facility]    Housing Stability        No Known Allergies   Current Outpatient Medications   Medication Sig Dispense Refill    atorvastatin 40 MG Oral Tab       Cobalamin Combinations (B-12) 1000-400 MCG Sublingual SL Tab       Azelastine HCl 137 MCG/SPRAY Nasal Solution       Ranolazine ER 1000 MG Oral Tablet 12 Hr       allopurinol 100 MG Oral Tab Take 1 tablet (100 mg total) by mouth 2 (two) times daily. 180 tablet 3    Cholecalciferol (D3 2000) 50 MCG (2000 UT) Oral Cap Take 2,000 Units by mouth.      montelukast 10 MG Oral Tab Take 1 tablet (10 mg total) by mouth daily.      amLODIPine 10 MG Oral Tab Take 1 tablet (10 mg total) by mouth daily.      MELOXICAM 15 MG Oral Tab TAKE 1 TABLET BY MOUTH DAILY 90 tablet 3    latanoprost 0.005 %  Ophthalmic Solution Place 1 drop into both eyes nightly.      TAMSULOSIN 0.4 MG Oral Cap TAKE 2 CAPSULES BY MOUTH  DAILY (Patient taking differently: Taking 1 cap daily) 180 capsule 3    nebivolol 10 MG Oral Tab Take 1 tablet (10 mg total) by mouth daily.      fluticasone propionate 50 MCG/ACT Nasal Suspension 2 sprays by Nasal route daily. 16 g 3    Polyethyl Glycol-Propyl Glycol (SYSTANE OP) Apply to eye.      cetirizine 10 MG Oral Tab Take 1 tablet (10 mg total) by mouth daily.         OBJECTIVE  Physical Exam:   alert, appears stated age, and cooperative, Speaking in full sentences comfortably, and Normal work of breathing    I spent a total of 10 minutes on the video visit.     ASSESSMENT & PLAN  Diagnoses and all orders for this visit:        ICD-10-CM    1. Dizziness  R42       2. Dyspnea, unspecified type  R06.00          He is coming into the office in two weeks.  He will stop his lipid lowering medication the week prior to see if this has an impact on his lightheadedness.  He is also scheduled for sinus surgery . Possible it is due to his sinuses.      Kevin Armendariz, DO

## 2024-05-20 RX ORDER — LANOLIN ALCOHOL/MO/W.PET/CERES
1000 CREAM (GRAM) TOPICAL DAILY
COMMUNITY

## 2024-05-20 SDOH — SOCIAL STABILITY: SOCIAL INSECURITY: HOW OFTEN DOES ANYONE, INCLUDING FAMILY AND FRIENDS, THREATEN YOU WITH HARM?: NEVER

## 2024-05-20 SDOH — SOCIAL STABILITY: SOCIAL INSECURITY: HOW OFTEN DOES ANYONE, INCLUDING FAMILY AND FRIENDS, INSULT OR TALK DOWN TO YOU?: NEVER

## 2024-05-20 SDOH — SOCIAL STABILITY: SOCIAL INSECURITY: HOW OFTEN DOES ANYONE, INCLUDING FAMILY AND FRIENDS, SCREAM OR CURSE AT YOU?: NEVER

## 2024-05-20 SDOH — SOCIAL STABILITY: SOCIAL INSECURITY: HOW OFTEN DOES ANYONE, INCLUDING FAMILY AND FRIENDS, PHYSICALLY HURT YOU?: NEVER

## 2024-05-20 ASSESSMENT — ACTIVITIES OF DAILY LIVING (ADL)
SENSORY_SUPPORT_DEVICES: HEARING AIDS;EYEGLASSES
ADL_BEFORE_ADMISSION: INDEPENDENT
ADL_SCORE: 12

## 2024-05-21 ENCOUNTER — TELEPHONE (OUTPATIENT)
Dept: FAMILY MEDICINE CLINIC | Facility: CLINIC | Age: 69
End: 2024-05-21

## 2024-05-21 NOTE — TELEPHONE ENCOUNTER
Pre Op Missing Information Forms form [facility] have been received forms placed in red pre-op folder to be reviewed.

## 2024-05-24 ENCOUNTER — LAB ENCOUNTER (OUTPATIENT)
Dept: LAB | Age: 69
End: 2024-05-24
Attending: FAMILY MEDICINE

## 2024-05-24 ENCOUNTER — OFFICE VISIT (OUTPATIENT)
Dept: FAMILY MEDICINE CLINIC | Facility: CLINIC | Age: 69
End: 2024-05-24
Payer: COMMERCIAL

## 2024-05-24 ENCOUNTER — HOSPITAL ENCOUNTER (OUTPATIENT)
Dept: GENERAL RADIOLOGY | Facility: HOSPITAL | Age: 69
Discharge: HOME | End: 2024-05-24
Attending: FAMILY MEDICINE

## 2024-05-24 VITALS
HEIGHT: 75 IN | WEIGHT: 296.25 LBS | SYSTOLIC BLOOD PRESSURE: 124 MMHG | RESPIRATION RATE: 17 BRPM | HEART RATE: 70 BPM | OXYGEN SATURATION: 95 % | BODY MASS INDEX: 36.84 KG/M2 | DIASTOLIC BLOOD PRESSURE: 90 MMHG

## 2024-05-24 DIAGNOSIS — C85.90 NON-HODGKIN'S LYMPHOMA, UNSPECIFIED BODY REGION, UNSPECIFIED NON-HODGKIN LYMPHOMA TYPE (HCC): ICD-10-CM

## 2024-05-24 DIAGNOSIS — G47.33 OSA (OBSTRUCTIVE SLEEP APNEA): ICD-10-CM

## 2024-05-24 DIAGNOSIS — N40.1 BENIGN PROSTATIC HYPERPLASIA WITH NOCTURIA: ICD-10-CM

## 2024-05-24 DIAGNOSIS — K21.00 GASTROESOPHAGEAL REFLUX DISEASE WITH ESOPHAGITIS WITHOUT HEMORRHAGE: ICD-10-CM

## 2024-05-24 DIAGNOSIS — E78.00 HYPERCHOLESTEROLEMIA: ICD-10-CM

## 2024-05-24 DIAGNOSIS — G89.29 CHRONIC BILATERAL LOW BACK PAIN WITHOUT SCIATICA: ICD-10-CM

## 2024-05-24 DIAGNOSIS — R31.29 MICROHEMATURIA: ICD-10-CM

## 2024-05-24 DIAGNOSIS — Z00.00 ENCOUNTER FOR ANNUAL HEALTH EXAMINATION: ICD-10-CM

## 2024-05-24 DIAGNOSIS — R35.1 BENIGN PROSTATIC HYPERPLASIA WITH NOCTURIA: ICD-10-CM

## 2024-05-24 DIAGNOSIS — M54.50 CHRONIC BILATERAL LOW BACK PAIN WITHOUT SCIATICA: ICD-10-CM

## 2024-05-24 DIAGNOSIS — R42 LIGHTHEADEDNESS: ICD-10-CM

## 2024-05-24 DIAGNOSIS — M1A.09X0 IDIOPATHIC CHRONIC GOUT OF MULTIPLE SITES WITHOUT TOPHUS: ICD-10-CM

## 2024-05-24 DIAGNOSIS — M47.817 LUMBOSACRAL SPONDYLOSIS WITHOUT MYELOPATHY: ICD-10-CM

## 2024-05-24 DIAGNOSIS — G47.34 IDIOPATHIC SLEEP RELATED NONOBSTRUCTIVE ALVEOLAR HYPOVENTILATION: ICD-10-CM

## 2024-05-24 DIAGNOSIS — Z01.818 PRE-OP EXAMINATION: ICD-10-CM

## 2024-05-24 DIAGNOSIS — I27.20 PULMONARY HYPERTENSION (HCC): ICD-10-CM

## 2024-05-24 DIAGNOSIS — J32.0 CHRONIC MAXILLARY SINUSITIS: ICD-10-CM

## 2024-05-24 DIAGNOSIS — I10 PRIMARY HYPERTENSION: ICD-10-CM

## 2024-05-24 DIAGNOSIS — E66.01 SEVERE OBESITY (BMI 35.0-39.9) WITH COMORBIDITY (HCC): Chronic | ICD-10-CM

## 2024-05-24 DIAGNOSIS — C67.0 MALIGNANT NEOPLASM OF TRIGONE OF URINARY BLADDER (HCC): ICD-10-CM

## 2024-05-24 DIAGNOSIS — G62.9 NEUROPATHY: ICD-10-CM

## 2024-05-24 DIAGNOSIS — J34.2 NASAL SEPTAL DEVIATION: ICD-10-CM

## 2024-05-24 DIAGNOSIS — Z80.42 FAMILY HISTORY OF PROSTATE CANCER: ICD-10-CM

## 2024-05-24 DIAGNOSIS — J45.20 MILD INTERMITTENT ASTHMA WITHOUT COMPLICATION (HCC): Primary | ICD-10-CM

## 2024-05-24 DIAGNOSIS — M51.26 HERNIATED NUCLEUS PULPOSUS, L4-5 LEFT: ICD-10-CM

## 2024-05-24 DIAGNOSIS — N62 GYNECOMASTIA: ICD-10-CM

## 2024-05-24 DIAGNOSIS — N20.0 NEPHROLITHIASIS: ICD-10-CM

## 2024-05-24 DIAGNOSIS — H90.42 SENSORINEURAL HEARING LOSS (SNHL) OF LEFT EAR WITH UNRESTRICTED HEARING OF RIGHT EAR: ICD-10-CM

## 2024-05-24 DIAGNOSIS — R60.0 LOCALIZED EDEMA: ICD-10-CM

## 2024-05-24 DIAGNOSIS — N13.30 HYDRONEPHROSIS, LEFT: ICD-10-CM

## 2024-05-24 PROBLEM — R07.9 CHEST PAIN: Status: RESOLVED | Noted: 2023-04-06 | Resolved: 2024-05-24

## 2024-05-24 LAB
ATRIAL RATE: 57 BPM
EST. AVERAGE GLUCOSE BLD GHB EST-MCNC: 94 MG/DL (ref 68–126)
HBA1C MFR BLD: 4.9 % (ref ?–5.7)
P AXIS: 57 DEGREES
P-R INTERVAL: 166 MS
Q-T INTERVAL: 438 MS
QRS DURATION: 94 MS
QTC CALCULATION (BEZET): 426 MS
R AXIS: 39 DEGREES
T AXIS: 54 DEGREES
URATE SERPL-MCNC: 3.7 MG/DL
VENTRICULAR RATE: 57 BPM

## 2024-05-24 PROCEDURE — 93010 ELECTROCARDIOGRAM REPORT: CPT | Performed by: INTERNAL MEDICINE

## 2024-05-24 PROCEDURE — 1170F FXNL STATUS ASSESSED: CPT | Performed by: FAMILY MEDICINE

## 2024-05-24 PROCEDURE — 93005 ELECTROCARDIOGRAM TRACING: CPT

## 2024-05-24 PROCEDURE — 3074F SYST BP LT 130 MM HG: CPT | Performed by: FAMILY MEDICINE

## 2024-05-24 PROCEDURE — 84550 ASSAY OF BLOOD/URIC ACID: CPT

## 2024-05-24 PROCEDURE — 1160F RVW MEDS BY RX/DR IN RCRD: CPT | Performed by: FAMILY MEDICINE

## 2024-05-24 PROCEDURE — 3008F BODY MASS INDEX DOCD: CPT | Performed by: FAMILY MEDICINE

## 2024-05-24 PROCEDURE — 96160 PT-FOCUSED HLTH RISK ASSMT: CPT | Performed by: FAMILY MEDICINE

## 2024-05-24 PROCEDURE — 72050 X-RAY EXAM NECK SPINE 4/5VWS: CPT | Performed by: FAMILY MEDICINE

## 2024-05-24 PROCEDURE — 99499 UNLISTED E&M SERVICE: CPT | Performed by: FAMILY MEDICINE

## 2024-05-24 PROCEDURE — 3080F DIAST BP >= 90 MM HG: CPT | Performed by: FAMILY MEDICINE

## 2024-05-24 PROCEDURE — G0439 PPPS, SUBSEQ VISIT: HCPCS | Performed by: FAMILY MEDICINE

## 2024-05-24 PROCEDURE — 83036 HEMOGLOBIN GLYCOSYLATED A1C: CPT

## 2024-05-24 PROCEDURE — 36415 COLL VENOUS BLD VENIPUNCTURE: CPT

## 2024-05-24 RX ORDER — TIZANIDINE 4 MG/1
1 TABLET ORAL 3 TIMES DAILY PRN
COMMUNITY
Start: 2024-02-20

## 2024-05-24 RX ORDER — OMEPRAZOLE 40 MG/1
CAPSULE, DELAYED RELEASE ORAL
COMMUNITY
Start: 2024-04-29

## 2024-05-24 RX ORDER — ISOSORBIDE MONONITRATE 30 MG/1
1 TABLET, EXTENDED RELEASE ORAL DAILY
COMMUNITY

## 2024-05-24 RX ORDER — CHLORHEXIDINE GLUCONATE ORAL RINSE 1.2 MG/ML
SOLUTION DENTAL
COMMUNITY

## 2024-05-29 ENCOUNTER — MED REC SCAN ONLY (OUTPATIENT)
Dept: FAMILY MEDICINE CLINIC | Facility: CLINIC | Age: 69
End: 2024-05-29

## 2024-06-04 ENCOUNTER — PATIENT MESSAGE (OUTPATIENT)
Dept: FAMILY MEDICINE CLINIC | Facility: CLINIC | Age: 69
End: 2024-06-04

## 2024-06-04 ENCOUNTER — TELEPHONE (OUTPATIENT)
Dept: FAMILY MEDICINE CLINIC | Facility: CLINIC | Age: 69
End: 2024-06-04

## 2024-06-04 NOTE — TELEPHONE ENCOUNTER
Spoke with Maya GALVAN , Date of Birth verified  She wants to follow up on pre op clearance note, pls fax [phone], office notes faxed as requested.   Patient surgery scheduled tomorrow.       IBRAHIMA

## 2024-06-04 NOTE — PROGRESS NOTES
Subjective:   Sean Zheng is a 69 year old male who presents for a Medicare Subsequent Annual Wellness visit (Pt already had Initial Annual Wellness) and scheduled follow up of multiple significant but stable problems.       History/Other:   Fall Risk Assessment:   He has been screened for Falls and is High Risk. Fall Prevention information provided to patient in After Visit Summary.    Do you feel unsteady when standing or walking?: Yes  Do you worry about falling?: No  Have you fallen in the past year?: No     Cognitive Assessment:   He had a completely normal cognitive assessment - see flowsheet entries     Functional Ability/Status:   Sean Zheng has some abnormal functions as listed below:  He has Vision problems based on screening of functional status. He has problems with Daily Activities based on screening of functional status.       Depression Screening (PHQ-2/PHQ-9): PHQ-2 SCORE: 0  , done 5/24/2024             Advanced Directives:   He does NOT have a Living Will. [Do you have a living will?: No]  He does NOT have a Power of  for Health Care. [Do you have a healthcare power of ?: No]  Discussed Advance Care Planning with patient (and family/surrogate if present). Standard forms made available to patient in After Visit Summary.      Patient Active Problem List   Diagnosis    Nephrolithiasis    Hydronephrosis, left    Microhematuria    Benign prostatic hyperplasia with nocturia    Family history of prostate cancer    Severe obesity (BMI 35.0-39.9) with comorbidity (HCC)    Nasal septal deviation    Hypercholesterolemia    Sensorineural hearing loss (SNHL) of left ear with unrestricted hearing of right ear    Gastroesophageal reflux disease    Chronic bilateral low back pain without sciatica    Lumbosacral spondylosis without myelopathy    Herniated nucleus pulposus, L4-5 left    Malignant neoplasm of trigone of urinary bladder (HCC)    JOHN (obstructive sleep apnea)    Pulmonary  hypertension (HCC)    Chronic gout of multiple sites    Asthma (HCC)    Hypertensive disorder    Gynecomastia    Edema    Idiopathic sleep related nonobstructive alveolar hypoventilation    Malignant lymphoma (HCC)    Chronic maxillary sinusitis     Allergies:  He has No Known Allergies.    Current Medications:  Outpatient Medications Marked as Taking for the 5/24/24 encounter (Office Visit) with Kevin Armendariz DO   Medication Sig    isosorbide mononitrate ER 30 MG Oral Tablet 24 Hr Take 1 tablet (30 mg total) by mouth daily.    Omeprazole 40 MG Oral Capsule Delayed Release     tiZANidine 4 MG Oral Tab Take 1 tablet (4 mg total) by mouth 3 (three) times daily as needed.    Saccharomyces boulardii (FLORASTOR OR)     atorvastatin 40 MG Oral Tab     Cobalamin Combinations (B-12) 1000-400 MCG Sublingual SL Tab     Azelastine HCl 137 MCG/SPRAY Nasal Solution     Ranolazine ER 1000 MG Oral Tablet 12 Hr     allopurinol 100 MG Oral Tab Take 1 tablet (100 mg total) by mouth 2 (two) times daily.    Cholecalciferol (D3 2000) 50 MCG (2000 UT) Oral Cap Take 2,000 Units by mouth.    montelukast 10 MG Oral Tab Take 1 tablet (10 mg total) by mouth daily.    amLODIPine 10 MG Oral Tab Take 1 tablet (10 mg total) by mouth daily.    MELOXICAM 15 MG Oral Tab TAKE 1 TABLET BY MOUTH DAILY    latanoprost 0.005 % Ophthalmic Solution Place 1 drop into both eyes nightly.    TAMSULOSIN 0.4 MG Oral Cap TAKE 2 CAPSULES BY MOUTH  DAILY (Patient taking differently: Taking 1 cap daily)    nebivolol 10 MG Oral Tab Take 1 tablet (10 mg total) by mouth daily.    fluticasone propionate 50 MCG/ACT Nasal Suspension 2 sprays by Nasal route daily.    Polyethyl Glycol-Propyl Glycol (SYSTANE OP) Apply to eye.    cetirizine 10 MG Oral Tab Take 1 tablet (10 mg total) by mouth daily.       Medical History:  He  has a past medical history of Asthma (HCC), Bladder cancer (HCC) (2017), Calculus of kidney, Colon adenomas (02/01/2021), Colon adenomas  (02/07/2024), Colon polyps, GERD (gastroesophageal reflux disease), Gynecomastia (2014), Hearing impairment, High blood pressure, High cholesterol, Hypercholesterolemia (09/10/2020), Hyperlipidemia (2022), Hypertensive disorder (05/25/2023), Kidney stone, Lymphoma (HCC) (2022), Obesity, Osteoarthritis, Sleep apnea, Urethral stricture, and Visual impairment.  Surgical History:  He  has a past surgical history that includes colonoscopy (07/11/2014); knee arthroscopy (Left, 2007); Inguinal hernia repair (Right, 1999); Breast biopsy (Right, 08/01/2014); lipoma removal (Bilateral, 08/01/2014); tonsillectomy (1964); knee surgery (Right); cystouretero w/excise tumor (Left, 09/2017); lithotripsy (09/2017); colonoscopy (03/20/2018); egd (03/20/2018); lipoma removal (Left, 2019); repair of nasal septum; excision turbinate,submucous; other surgical history; colonoscopy (N/A, 02/01/2021); endoscopic ultrasound - internal (02/01/2021); angioplasty (coronary) (May 4 2023); hernia surgery (1999); skin surgery (2016); egd (02/07/2024); and colonoscopy (02/07/2024).   Family History:  His family history includes Breast Cancer (age of onset: 70) in his mother; Cancer in his brother, brother, maternal grandmother, and sister; Cancer (age of onset: 70) in his father and mother; Diabetes in his father; Obesity in his father; Other (age of onset: 60) in his sister.  Social History:  He  reports that he has never smoked. He has never been exposed to tobacco smoke. He has never used smokeless tobacco. He reports current alcohol use of about 10.0 standard drinks of alcohol per week. He reports that he does not use drugs.    Tobacco:  He has never smoked tobacco.    CAGE Alcohol Screen:   CAGE screening score of 0 on 5/24/2024, showing low risk of alcohol abuse.      Patient Care Team:  Kevin Armendariz DO as PCP - General (Family Medicine)  Eric Holland DO (Cardiovascular Diseases)    Review of Systems   Constitutional:  Positive for  fatigue.   HENT: Negative.     Eyes: Negative.    Respiratory: Negative.     Cardiovascular: Negative.    Gastrointestinal: Negative.    Endocrine: Negative for polydipsia, polyphagia and polyuria.   Genitourinary: Negative.    Musculoskeletal: Negative.    Skin: Negative.         No mole changes   Allergic/Immunologic: Negative for environmental allergies.   Neurological:  Positive for dizziness and numbness. Negative for weakness and headaches.   Hematological: Negative.    Psychiatric/Behavioral:  Negative for sleep disturbance.         No anxiety or depressed feelings          Objective:   Physical Exam  Vitals reviewed.   Constitutional:       Appearance: Normal appearance. He is well-developed. He is obese.   HENT:      Head: Normocephalic.      Right Ear: Tympanic membrane, ear canal and external ear normal.      Left Ear: Tympanic membrane, ear canal and external ear normal.      Nose: Nose normal.   Eyes:      General: Lids are normal.      Conjunctiva/sclera: Conjunctivae normal.      Pupils: Pupils are equal, round, and reactive to light.      Funduscopic exam:     Right eye: No hemorrhage or papilledema.         Left eye: No hemorrhage or papilledema.   Neck:      Vascular: Normal carotid pulses. No JVD.      Trachea: Trachea normal.   Cardiovascular:      Rate and Rhythm: Regular rhythm.      Pulses:           Carotid pulses are 2+ on the right side and 2+ on the left side.       Radial pulses are 2+ on the right side and 2+ on the left side.      Heart sounds: Normal heart sounds.   Pulmonary:      Breath sounds: Normal breath sounds.   Abdominal:      Tenderness: There is no abdominal tenderness.   Musculoskeletal:      Cervical back: Normal, normal range of motion and neck supple.      Thoracic back: Normal.      Lumbar back: Tenderness present. Decreased range of motion.   Lymphadenopathy:      Cervical: No cervical adenopathy.   Skin:     Comments: No suspicious lesions waist up exam    Neurological:      General: No focal deficit present.      Mental Status: He is alert and oriented to person, place, and time.      Sensory: No sensory deficit.      Deep Tendon Reflexes: Reflexes are normal and symmetric.   Psychiatric:         Mood and Affect: Mood normal. Mood is not anxious or depressed.          /90   Pulse 70   Resp 17   Ht 6' 3\" (1.905 m)   Wt 296 lb 4 oz (134.4 kg)   SpO2 95%   BMI 37.03 kg/m²  Estimated body mass index is 37.03 kg/m² as calculated from the following:    Height as of this encounter: 6' 3\" (1.905 m).    Weight as of this encounter: 296 lb 4 oz (134.4 kg).    Medicare Hearing Assessment:   Hearing Screening    Screening Method: Finger Rub  Finger Rub Result: Pass         Visual Acuity:   Right Eye Visual Acuity: Corrected Right Eye Chart Acuity: 20/25   Left Eye Visual Acuity: Corrected Left Eye Chart Acuity: 20/25   Both Eyes Visual Acuity: Corrected Both Eyes Chart Acuity: 20/25   Able To Tolerate Visual Acuity: Yes        Assessment & Plan:   Sean Zheng is a 69 year old male who presents for a Medicare Assessment.     1. Mild intermittent asthma without complication (HCC) (Primary)  Stable and intermittent use of inhaler.    2. Benign prostatic hyperplasia with nocturia  Stable no new issues    3. Chronic bilateral low back pain without sciatica  Chronic in nature has had interventions before in the past no new issues    4. Idiopathic chronic gout of multiple sites without tophus  -     Cancel: Uric Acid, Urine 24 hr; Future; Expected date: 05/24/2024  -     Uric Acid; Future; Expected date: 05/24/2024  Uric acid level    5. Chronic maxillary sinusitis  Chronic condition may also be leading to some of his symptoms that he is having with dizziness and lightheadedness.  Seeing specialist    6. Localized edema  Intermittent in nature worse with inactivity diuretics as needed    7. Family history of prostate cancer    8. Gastroesophageal reflux disease with  esophagitis without hemorrhage  Lab medication works to resolve symptoms    9. Gynecomastia  Symptomatic problem no correction at this time    10. Herniated nucleus pulposus, L4-5 left  Stable at this time    11. Hydronephrosis, left  Resolved    12. Hypercholesterolemia  Medication and regular management with cardiologist    13. Primary hypertension  -     EKG 12 Lead; Future; Expected date: 05/24/2024  -     Hemoglobin A1C; Future; Expected date: 05/24/2024    14. Idiopathic sleep related nonobstructive alveolar hypoventilation  Stable and continue medicine intervention with CPAP machine    15. Lumbosacral spondylosis without myelopathy  Stable    16. Microhematuria  Resolved    17. Nasal septal deviation  See sinus surgeon    18. Nephrolithiasis  Stable    19. JOHN (obstructive sleep apnea)  Use of CPAP    20. Pulmonary hypertension (HCC)  Use of CPAP also follows up with cardiologist    21. Sensorineural hearing loss (SNHL) of left ear with unrestricted hearing of right ear  See ENT needs    22. Severe obesity (BMI 35.0-39.9) with comorbidity (HCC)  He would benefit from weight loss which was encouraged through diet and exercise    23. Non-Hodgkin's lymphoma, unspecified body region, unspecified non-Hodgkin lymphoma type (HCC)  Stable sees hematologist oncologist    24. Malignant neoplasm of trigone of urinary bladder (HCC)  Follow-up urology    25. Pre-op examination  Medically cleared for surgery and will be having sinus surgery with Dr. Obrien on 6/5/2024    26. Neuropathy  Stable and chronic    27. Lightheadedness  -     XR CERVICAL SPINE (4VIEWS) (CPT=72050); Future; Expected date: 05/24/2024  May be due to his sinus condition otherwise we will also obtain a x-ray of the neck at this time    28. Encounter for annual health examination    The patient indicates understanding of these issues and agrees to the plan.  Lab work ordered.  Reinforced healthy diet, lifestyle, and exercise.      No follow-ups on  file.     Kevin DO Marcello, 6/4/2024     Supplementary Documentation:   General Health:  In the past six months, have you lost more than 10 pounds without trying?: 2 - No  Has your appetite been poor?: No  Type of Diet: Balanced  How does the patient maintain a good energy level?: Daily Walks  How would you describe your daily physical activity?: Light  How would you describe your current health state?: Fair  How do you maintain positive mental well-being?: Social Interaction  On a scale of 0 to 10, with 0 being no pain and 10 being severe pain, what is your pain level?: 0 - (None)  In the past six months, have you experienced urine leakage?: 0-No  At any time do you feel concerned for the safety/well-being of yourself and/or your children, in your home or elsewhere?: No  Have you had any immunizations at another office such as Influenza, Hepatitis B, Tetanus, or Pneumococcal?: Yes        Sean Zheng's SCREENING SCHEDULE   Tests on this list are recommended by your physician but may not be covered, or covered at this frequency, by your insurer.   Please check with your insurance carrier before scheduling to verify coverage.   PREVENTATIVE SERVICES FREQUENCY &  COVERAGE DETAILS LAST COMPLETION DATE   Diabetes Screening    Fasting Blood Sugar / Glucose    One screening every 12 months if never tested or if previously tested but not diagnosed with pre-diabetes   One screening every 6 months if diagnosed with pre-diabetes Lab Results   Component Value Date     (H) 05/08/2024        Cardiovascular Disease Screening    Lipid Panel  Cholesterol  Lipoprotein (HDL)  Triglycerides Covered every 5 years for all Medicare beneficiaries without apparent signs or symptoms of cardiovascular disease Lab Results   Component Value Date    CHOLEST 159 05/08/2024    HDL 62 (H) 05/08/2024    LDL 73 05/08/2024    TRIG 142 05/08/2024         Electrocardiogram (EKG)   Covered if needed at Welcome to Medicare, and  non-screening if indicated for medical reasons 05/24/2024      Ultrasound Screening for Abdominal Aortic Aneurysm (AAA) Covered once in a lifetime for one of the following risk factors    Men who are 65-75 years old and have ever smoked    Anyone with a family history -     Colorectal Cancer Screening  Covered for ages 50-85; only need ONE of the following:    Colonoscopy   Covered every 10 years    Covered every 2 years if patient is at high risk or previous colonoscopy was abnormal 02/07/2024    Health Maintenance   Topic Date Due    Colorectal Cancer Screening  02/07/2027       Flexible Sigmoidoscopy   Covered every 4 years -    Fecal Occult Blood Test Covered annually -   Prostate Cancer Screening    Prostate-Specific Antigen (PSA) Annually Lab Results   Component Value Date    PSA 1.0 11/15/2018     Health Maintenance   Topic Date Due    PSA  05/08/2026      Immunizations    Influenza Covered once per flu season  Please get every year 10/29/2023  No recommendations at this time    Pneumococcal Each vaccine ([id number] & [id number]) covered once after 65 Prevnar 13: 10/24/2019    Niohyikge42: 09/10/2020     No recommendations at this time    Hepatitis B One screening covered for patients with certain risk factors   -  No recommendations at this time    Tetanus Toxoid Not covered by Medicare Part B unless medically necessary (cut with metal); may be covered with your pharmacy prescription benefits -    Tetanus, Diptheria and Pertusis TD and TDaP Not covered by Medicare Part B -  No recommendations at this time    Zoster Not covered by Medicare Part B; may be covered with your pharmacy  prescription benefits -  Zoster Vaccines(1 of 2) Never done

## 2024-06-04 NOTE — TELEPHONE ENCOUNTER
Pre-op forms have been faxed to [phone] confirmation received, forms will be held at Dr. Rustam moon in the red pre-op folder

## 2024-06-04 NOTE — PATIENT INSTRUCTIONS
Sean Zheng's SCREENING SCHEDULE   Tests on this list are recommended by your physician but may not be covered, or covered at this frequency, by your insurer.   Please check with your insurance carrier before scheduling to verify coverage.   PREVENTATIVE SERVICES FREQUENCY &  COVERAGE DETAILS LAST COMPLETION DATE   Diabetes Screening    Fasting Blood Sugar / Glucose    One screening every 12 months if never tested or if previously tested but not diagnosed with pre-diabetes   One screening every 6 months if diagnosed with pre-diabetes Lab Results   Component Value Date     (H) 05/08/2024        Cardiovascular Disease Screening    Lipid Panel  Cholesterol  Lipoprotein (HDL)  Triglycerides Covered every 5 years for all Medicare beneficiaries without apparent signs or symptoms of cardiovascular disease Lab Results   Component Value Date    CHOLEST 159 05/08/2024    HDL 62 (H) 05/08/2024    LDL 73 05/08/2024    TRIG 142 05/08/2024         Electrocardiogram (EKG)   Covered if needed at Welcome to Medicare, and non-screening if indicated for medical reasons 05/24/2024      Ultrasound Screening for Abdominal Aortic Aneurysm (AAA) Covered once in a lifetime for one of the following risk factors   • Men who are 65-75 years old and have ever smoked   • Anyone with a family history -     Colorectal Cancer Screening  Covered for ages 50-85; only need ONE of the following:    Colonoscopy   Covered every 10 years    Covered every 2 years if patient is at high risk or previous colonoscopy was abnormal 02/07/2024    Health Maintenance   Topic Date Due   • Colorectal Cancer Screening  02/07/2027       Flexible Sigmoidoscopy   Covered every 4 years -    Fecal Occult Blood Test Covered annually -   Prostate Cancer Screening    Prostate-Specific Antigen (PSA) Annually Lab Results   Component Value Date    PSA 1.0 11/15/2018     Health Maintenance   Topic Date Due   • PSA  05/08/2026      Immunizations    Influenza Covered  once per flu season  Please get every year 10/29/2023  No recommendations at this time    Pneumococcal Each vaccine ([id number] & [id number]) covered once after 65 Prevnar 13: 10/24/2019    Auvxioxwu57: 09/10/2020     No recommendations at this time    Hepatitis B One screening covered for patients with certain risk factors   -  No recommendations at this time    Tetanus Toxoid Not covered by Medicare Part B unless medically necessary (cut with metal); may be covered with your pharmacy prescription benefits -    Tetanus, Diptheria and Pertusis TD and TDaP Not covered by Medicare Part B -  No recommendations at this time    Zoster Not covered by Medicare Part B; may be covered with your pharmacy  prescription benefits -  Zoster Vaccines(1 of 2) Never done

## 2024-06-04 NOTE — TELEPHONE ENCOUNTER
[facility] called, verified patient's Name and . She is calling for the pre-op clearance note for patient. Scheduled for sinus surgery tomorrow , first case at 7:30 am.    Dr. Armendariz please advise.

## 2024-06-05 ENCOUNTER — HOSPITAL ENCOUNTER (OUTPATIENT)
Age: 69
Discharge: HOME | End: 2024-06-05
Attending: OTOLARYNGOLOGY | Admitting: OTOLARYNGOLOGY

## 2024-06-05 ENCOUNTER — ANESTHESIA EVENT (OUTPATIENT)
Dept: SURGERY | Age: 69
End: 2024-06-05

## 2024-06-05 ENCOUNTER — ANESTHESIA (OUTPATIENT)
Dept: SURGERY | Age: 69
End: 2024-06-05

## 2024-06-05 VITALS
OXYGEN SATURATION: 92 % | DIASTOLIC BLOOD PRESSURE: 76 MMHG | SYSTOLIC BLOOD PRESSURE: 108 MMHG | WEIGHT: 295.2 LBS | BODY MASS INDEX: 36.7 KG/M2 | TEMPERATURE: 97.5 F | HEART RATE: 77 BPM | RESPIRATION RATE: 16 BRPM | HEIGHT: 75 IN

## 2024-06-05 PROCEDURE — 10002801 HB RX 250 W/O HCPCS: Performed by: OTOLARYNGOLOGY

## 2024-06-05 PROCEDURE — 10002807 HB RX 258: Performed by: ANESTHESIOLOGY

## 2024-06-05 PROCEDURE — 10006023 HB SUPPLY 272: Performed by: OTOLARYNGOLOGY

## 2024-06-05 PROCEDURE — C1726 CATH, BAL DIL, NON-VASCULAR: HCPCS | Performed by: OTOLARYNGOLOGY

## 2024-06-05 PROCEDURE — 10006027 HB SUPPLY 278: Performed by: OTOLARYNGOLOGY

## 2024-06-05 PROCEDURE — 10002800 HB RX 250 W HCPCS: Performed by: OTOLARYNGOLOGY

## 2024-06-05 PROCEDURE — 10002801 HB RX 250 W/O HCPCS: Performed by: ANESTHESIOLOGY

## 2024-06-05 PROCEDURE — 13000037 HB COMPLEX CASE EACH ADD MINUTE: Performed by: OTOLARYNGOLOGY

## 2024-06-05 PROCEDURE — 10004451 HB PACU RECOVERY 1ST 30 MINUTES: Performed by: OTOLARYNGOLOGY

## 2024-06-05 PROCEDURE — 10002803 HB RX 637

## 2024-06-05 PROCEDURE — 10002803 HB RX 637: Performed by: OTOLARYNGOLOGY

## 2024-06-05 PROCEDURE — C2625 STENT, NON-COR, TEM W/DEL SY: HCPCS | Performed by: OTOLARYNGOLOGY

## 2024-06-05 PROCEDURE — 13000036 HB COMPLEX  CASE S/U + 1ST 15 MIN: Performed by: OTOLARYNGOLOGY

## 2024-06-05 PROCEDURE — 13000001 HB PHASE II RECOVERY EA 30 MINUTES: Performed by: OTOLARYNGOLOGY

## 2024-06-05 PROCEDURE — 10002800 HB RX 250 W HCPCS: Performed by: ANESTHESIOLOGY

## 2024-06-05 PROCEDURE — 13000003 HB ANESTHESIA  GENERAL EA ADD MINUTE: Performed by: OTOLARYNGOLOGY

## 2024-06-05 PROCEDURE — 10004452 HB PACU ADDL 30 MINUTES: Performed by: OTOLARYNGOLOGY

## 2024-06-05 PROCEDURE — 13000002 HB ANESTHESIA  GENERAL  S/U + 1ST 15 MIN: Performed by: OTOLARYNGOLOGY

## 2024-06-05 PROCEDURE — 88305 TISSUE EXAM BY PATHOLOGIST: CPT | Performed by: OTOLARYNGOLOGY

## 2024-06-05 DEVICE — IMPLANT NSL PROPEL MOMETASONE FUROATE CONTOUR MOMETASONE: Type: IMPLANTABLE DEVICE | Site: NASAL SINUS | Status: FUNCTIONAL

## 2024-06-05 RX ORDER — DEXAMETHASONE SODIUM PHOSPHATE 10 MG/ML
INJECTION, SOLUTION INTRAMUSCULAR; INTRAVENOUS PRN
Status: DISCONTINUED | OUTPATIENT
Start: 2024-06-05 | End: 2024-06-05

## 2024-06-05 RX ORDER — DEXTROSE MONOHYDRATE 25 G/50ML
25 INJECTION, SOLUTION INTRAVENOUS PRN
Status: DISCONTINUED | OUTPATIENT
Start: 2024-06-05 | End: 2024-06-05 | Stop reason: HOSPADM

## 2024-06-05 RX ORDER — DROPERIDOL 2.5 MG/ML
0.62 INJECTION, SOLUTION INTRAMUSCULAR; INTRAVENOUS
Status: DISCONTINUED | OUTPATIENT
Start: 2024-06-05 | End: 2024-06-05 | Stop reason: HOSPADM

## 2024-06-05 RX ORDER — MIDAZOLAM HYDROCHLORIDE 1 MG/ML
INJECTION, SOLUTION INTRAMUSCULAR; INTRAVENOUS PRN
Status: DISCONTINUED | OUTPATIENT
Start: 2024-06-05 | End: 2024-06-05

## 2024-06-05 RX ORDER — DEXTROSE MONOHYDRATE 50 MG/ML
INJECTION, SOLUTION INTRAVENOUS CONTINUOUS PRN
Status: DISCONTINUED | OUTPATIENT
Start: 2024-06-05 | End: 2024-06-05 | Stop reason: HOSPADM

## 2024-06-05 RX ORDER — DEXAMETHASONE SODIUM PHOSPHATE 4 MG/ML
4 INJECTION, SOLUTION INTRA-ARTICULAR; INTRALESIONAL; INTRAMUSCULAR; INTRAVENOUS; SOFT TISSUE
Status: DISCONTINUED | OUTPATIENT
Start: 2024-06-05 | End: 2024-06-05 | Stop reason: HOSPADM

## 2024-06-05 RX ORDER — SCOLOPAMINE TRANSDERMAL SYSTEM 1 MG/1
1 PATCH, EXTENDED RELEASE TRANSDERMAL
Status: DISCONTINUED | OUTPATIENT
Start: 2024-06-05 | End: 2024-06-05 | Stop reason: HOSPADM

## 2024-06-05 RX ORDER — LIDOCAINE HYDROCHLORIDE 40 MG/ML
SOLUTION TOPICAL PRN
Status: DISCONTINUED | OUTPATIENT
Start: 2024-06-05 | End: 2024-06-05

## 2024-06-05 RX ORDER — CHLORHEXIDINE GLUCONATE ORAL RINSE 1.2 MG/ML
15 SOLUTION DENTAL ONCE
Status: DISCONTINUED | OUTPATIENT
Start: 2024-06-05 | End: 2024-06-05 | Stop reason: HOSPADM

## 2024-06-05 RX ORDER — HYDROCODONE BITARTRATE AND ACETAMINOPHEN 5; 325 MG/1; MG/1
1 TABLET ORAL EVERY 4 HOURS PRN
Status: DISCONTINUED | OUTPATIENT
Start: 2024-06-05 | End: 2024-06-05 | Stop reason: HOSPADM

## 2024-06-05 RX ORDER — MIDAZOLAM HYDROCHLORIDE 1 MG/ML
2 INJECTION, SOLUTION INTRAMUSCULAR; INTRAVENOUS ONCE
Status: DISCONTINUED | OUTPATIENT
Start: 2024-06-05 | End: 2024-06-05 | Stop reason: HOSPADM

## 2024-06-05 RX ORDER — ONDANSETRON 2 MG/ML
INJECTION INTRAMUSCULAR; INTRAVENOUS PRN
Status: DISCONTINUED | OUTPATIENT
Start: 2024-06-05 | End: 2024-06-05

## 2024-06-05 RX ORDER — CHLORHEXIDINE GLUCONATE ORAL RINSE 1.2 MG/ML
15 SOLUTION DENTAL
Status: DISCONTINUED | OUTPATIENT
Start: 2024-06-05 | End: 2024-06-05 | Stop reason: HOSPADM

## 2024-06-05 RX ORDER — HYDRALAZINE HYDROCHLORIDE 20 MG/ML
5 INJECTION INTRAMUSCULAR; INTRAVENOUS EVERY 10 MIN PRN
Status: DISCONTINUED | OUTPATIENT
Start: 2024-06-05 | End: 2024-06-05 | Stop reason: HOSPADM

## 2024-06-05 RX ORDER — DIPHENHYDRAMINE HYDROCHLORIDE 50 MG/ML
12.5 INJECTION INTRAMUSCULAR; INTRAVENOUS EVERY 4 HOURS PRN
Status: DISCONTINUED | OUTPATIENT
Start: 2024-06-05 | End: 2024-06-05 | Stop reason: HOSPADM

## 2024-06-05 RX ORDER — SODIUM CHLORIDE 9 MG/ML
INJECTION, SOLUTION INTRAVENOUS CONTINUOUS
Status: DISCONTINUED | OUTPATIENT
Start: 2024-06-05 | End: 2024-06-05 | Stop reason: HOSPADM

## 2024-06-05 RX ORDER — METOPROLOL SUCCINATE 25 MG/1
25 TABLET, EXTENDED RELEASE ORAL
Status: DISCONTINUED | OUTPATIENT
Start: 2024-06-05 | End: 2024-06-05 | Stop reason: HOSPADM

## 2024-06-05 RX ORDER — LIDOCAINE HYDROCHLORIDE 10 MG/ML
INJECTION, SOLUTION INFILTRATION; PERINEURAL PRN
Status: DISCONTINUED | OUTPATIENT
Start: 2024-06-05 | End: 2024-06-05

## 2024-06-05 RX ORDER — LIDOCAINE HYDROCHLORIDE 10 MG/ML
5 INJECTION, SOLUTION EPIDURAL; INFILTRATION; INTRACAUDAL; PERINEURAL PRN
Status: DISCONTINUED | OUTPATIENT
Start: 2024-06-05 | End: 2024-06-05 | Stop reason: HOSPADM

## 2024-06-05 RX ORDER — ONDANSETRON 2 MG/ML
4 INJECTION INTRAMUSCULAR; INTRAVENOUS 2 TIMES DAILY PRN
Status: DISCONTINUED | OUTPATIENT
Start: 2024-06-05 | End: 2024-06-05 | Stop reason: HOSPADM

## 2024-06-05 RX ORDER — PROPOFOL 10 MG/ML
INJECTION, EMULSION INTRAVENOUS PRN
Status: DISCONTINUED | OUTPATIENT
Start: 2024-06-05 | End: 2024-06-05

## 2024-06-05 RX ORDER — MAGNESIUM HYDROXIDE 1200 MG/15ML
LIQUID ORAL PRN
Status: DISCONTINUED | OUTPATIENT
Start: 2024-06-05 | End: 2024-06-05 | Stop reason: HOSPADM

## 2024-06-05 RX ORDER — SODIUM CHLORIDE, SODIUM LACTATE, POTASSIUM CHLORIDE, CALCIUM CHLORIDE 600; 310; 30; 20 MG/100ML; MG/100ML; MG/100ML; MG/100ML
INJECTION, SOLUTION INTRAVENOUS CONTINUOUS
Status: DISCONTINUED | OUTPATIENT
Start: 2024-06-05 | End: 2024-06-05 | Stop reason: HOSPADM

## 2024-06-05 RX ORDER — TRANEXAMIC ACID 10 MG/ML
INJECTION, SOLUTION INTRAVENOUS PRN
Status: DISCONTINUED | OUTPATIENT
Start: 2024-06-05 | End: 2024-06-05

## 2024-06-05 RX ORDER — SODIUM CHLORIDE, SODIUM LACTATE, POTASSIUM CHLORIDE, CALCIUM CHLORIDE 600; 310; 30; 20 MG/100ML; MG/100ML; MG/100ML; MG/100ML
INJECTION, SOLUTION INTRAVENOUS CONTINUOUS PRN
Status: DISCONTINUED | OUTPATIENT
Start: 2024-06-05 | End: 2024-06-05

## 2024-06-05 RX ORDER — NICOTINE POLACRILEX 4 MG
30 LOZENGE BUCCAL
Status: DISCONTINUED | OUTPATIENT
Start: 2024-06-05 | End: 2024-06-05 | Stop reason: HOSPADM

## 2024-06-05 RX ORDER — METOCLOPRAMIDE HYDROCHLORIDE 5 MG/ML
INJECTION INTRAMUSCULAR; INTRAVENOUS PRN
Status: DISCONTINUED | OUTPATIENT
Start: 2024-06-05 | End: 2024-06-05

## 2024-06-05 RX ORDER — METOCLOPRAMIDE HYDROCHLORIDE 5 MG/ML
5 INJECTION INTRAMUSCULAR; INTRAVENOUS EVERY 6 HOURS PRN
Status: DISCONTINUED | OUTPATIENT
Start: 2024-06-05 | End: 2024-06-05 | Stop reason: HOSPADM

## 2024-06-05 RX ORDER — ACETAMINOPHEN 325 MG/1
650 TABLET ORAL EVERY 4 HOURS PRN
Status: DISCONTINUED | OUTPATIENT
Start: 2024-06-05 | End: 2024-06-05 | Stop reason: HOSPADM

## 2024-06-05 RX ADMIN — FENTANYL CITRATE 50 MCG: 50 INJECTION INTRAMUSCULAR; INTRAVENOUS at 09:43

## 2024-06-05 RX ADMIN — CEFAZOLIN 3000 MG: 10 INJECTION, POWDER, FOR SOLUTION INTRAVENOUS at 07:58

## 2024-06-05 RX ADMIN — PROPOFOL 200 MG: 10 INJECTION, EMULSION INTRAVENOUS at 07:49

## 2024-06-05 RX ADMIN — FENTANYL CITRATE 100 MCG: 50 INJECTION INTRAMUSCULAR; INTRAVENOUS at 09:00

## 2024-06-05 RX ADMIN — ONDANSETRON 4 MG: 2 INJECTION INTRAMUSCULAR; INTRAVENOUS at 07:45

## 2024-06-05 RX ADMIN — LIDOCAINE HYDROCHLORIDE 4 ML: 40 SOLUTION TOPICAL at 07:51

## 2024-06-05 RX ADMIN — MIDAZOLAM HYDROCHLORIDE 2 MG: 1 INJECTION, SOLUTION INTRAMUSCULAR; INTRAVENOUS at 07:44

## 2024-06-05 RX ADMIN — LIDOCAINE HYDROCHLORIDE 100 MG: 10 INJECTION, SOLUTION INFILTRATION; PERINEURAL at 07:46

## 2024-06-05 RX ADMIN — TRANEXAMIC ACID 1000 MG: 10 INJECTION, SOLUTION INTRAVENOUS at 08:56

## 2024-06-05 RX ADMIN — CEFAZOLIN 3000 MG: 10 INJECTION, POWDER, FOR SOLUTION INTRAVENOUS at 13:14

## 2024-06-05 RX ADMIN — HYDROCODONE BITARTRATE AND ACETAMINOPHEN 1 TABLET: 5; 325 TABLET ORAL at 13:12

## 2024-06-05 RX ADMIN — METOCLOPRAMIDE HYDROCHLORIDE 10 MG: 5 INJECTION INTRAMUSCULAR; INTRAVENOUS at 07:45

## 2024-06-05 RX ADMIN — DEXAMETHASONE SODIUM PHOSPHATE 8 MG: 10 INJECTION INTRAMUSCULAR; INTRAVENOUS at 07:48

## 2024-06-05 RX ADMIN — FENTANYL CITRATE 150 MCG: 50 INJECTION INTRAMUSCULAR; INTRAVENOUS at 07:47

## 2024-06-05 RX ADMIN — SODIUM CHLORIDE, POTASSIUM CHLORIDE, SODIUM LACTATE AND CALCIUM CHLORIDE: 600; 310; 30; 20 INJECTION, SOLUTION INTRAVENOUS at 07:43

## 2024-06-05 ASSESSMENT — PAIN SCALES - GENERAL
PAINLEVEL_OUTOF10: 0
PAINLEVEL_OUTOF10: 0
PAINLEVEL_OUTOF10: 4
PAINLEVEL_OUTOF10: 0
PAINLEVEL_OUTOF10: 2
PAINLEVEL_OUTOF10: 0

## 2024-06-05 NOTE — TELEPHONE ENCOUNTER
Dr Armendariz, please see [employer] messages below.          From: Sean Zheng  To: Kevin Armendariz  Sent: 6/4/2024  6:42 PM CDT  Subject: Last Visit Recap  On notes of May 24 wellness appointment with Dr Armendariz.      \"Neurological:  Negative for dizziness, weakness, numbness and headaches. \"     Maybe I am reading incorrectly but have  dizziness, numbness/tingling in feet and headaches.        I'm in recovery from sinus surgery as I write this, hoping that it's correction will lesson or eliminate dizziness.     Micah       This statement appears to be a mis-recording on the record:   Neurological:  Negative for dizziness, weakness, numbness and headache    My number one issue is dizziness, fratigue, feet tingling and headaches.        Micah

## 2024-06-10 LAB
ASR DISCLAIMER: NORMAL
CASE RPRT: NORMAL
CLINICAL INFO: NORMAL
PATH REPORT.FINAL DX SPEC: NORMAL
PATH REPORT.GROSS SPEC: NORMAL

## 2024-06-12 ENCOUNTER — MED REC SCAN ONLY (OUTPATIENT)
Dept: INTERNAL MEDICINE CLINIC | Facility: CLINIC | Age: 69
End: 2024-06-12

## 2024-06-24 RX ORDER — OMEPRAZOLE 40 MG/1
40 CAPSULE, DELAYED RELEASE ORAL
Qty: 90 CAPSULE | Refills: 3 | Status: SHIPPED | OUTPATIENT
Start: 2024-06-24

## 2024-06-24 NOTE — TELEPHONE ENCOUNTER
Requested Prescriptions     Pending Prescriptions Disp Refills    OMEPRAZOLE 40 MG Oral Capsule Delayed Release [Pharmacy Med Name: Omeprazole 40 MG Oral Capsule Delayed Release] 90 capsule 3     Sig: TAKE 1 CAPSULE BY MOUTH DAILY  BEFORE BREAKFAST        LOV   6/20/2023    LR   4/29/2024

## 2024-07-16 ENCOUNTER — MED REC SCAN ONLY (OUTPATIENT)
Dept: FAMILY MEDICINE CLINIC | Facility: CLINIC | Age: 69
End: 2024-07-16

## 2024-07-25 ENCOUNTER — PATIENT MESSAGE (OUTPATIENT)
Dept: FAMILY MEDICINE CLINIC | Facility: CLINIC | Age: 69
End: 2024-07-25

## 2024-07-26 NOTE — TELEPHONE ENCOUNTER
From: Sean Zheng  To: Kevin Armendariz  Sent: 7/25/2024 3:50 PM CDT  Subject: Cardiologist blood work    Please find attached blood work from Dr. Holland.  Note - he mentioned that WBC was high. May be a result of injections in lower lumbar and both knees a few days before the test.  None the less I'm still dealing with lightheadedness and trying to get to the bottom of it. Neurologist thinks some could be a result of bulge in neck. Seeing pain management on Monday to review. Today had first PT following Balance Test. Seeing Skyler next week to review cardiac meds he has me on - started Jargiance 28 days ago with him. Sinus surgery was a success, breathing better, but still fatiqued and lightheaded -- though less since the injections last week; may have had some migration of steriod to neck to help with temprorary relief of inflammation.  Anyway, still trying to get to the bottom of this lightheadedness.   Need to find a way to wholelistically look at all variables and develop plan that probably includes: medicine modifications; physical therapy; patience for sinsus surgery recovery; treat inflamation in neck.    d

## 2024-07-31 NOTE — TELEPHONE ENCOUNTER
Noted.  He had a normal white blood count in 5/2024.  He can ask cardiologist to recheck next time he sees him.  I do not have those results.

## 2024-08-12 ENCOUNTER — APPOINTMENT (OUTPATIENT)
Dept: HEMATOLOGY/ONCOLOGY | Facility: HOSPITAL | Age: 69
End: 2024-08-12
Attending: INTERNAL MEDICINE
Payer: MEDICARE

## 2024-09-27 RX ORDER — ALLOPURINOL 100 MG/1
100 TABLET ORAL 2 TIMES DAILY
Qty: 180 TABLET | Refills: 3 | Status: SHIPPED | OUTPATIENT
Start: 2024-09-27

## 2024-09-27 NOTE — TELEPHONE ENCOUNTER
Please review. Protocol Failed; No Protocol    Requested Prescriptions   Pending Prescriptions Disp Refills    ALLOPURINOL 100 MG Oral Tab [Pharmacy Med Name: Allopurinol 100 MG Oral Tablet] 180 tablet 3     Sig: TAKE 1 TABLET BY MOUTH TWICE  DAILY       There is no refill protocol information for this order            Future Appointments         Provider Department Appt Notes    In 2 months Joshua Mohan MD [facility] Review conditions          Recent Outpatient Visits              4 months ago Mild intermittent asthma without complication ([facility]ly [facility])    [facility] Kevin Armendariz DO    Office Visit    4 months ago Dizziness    [facility] Kevin Armendariz DO    Telemedicine    7 months ago Small lymphocytic lymphoma ([facility])    [facility] Yan Palacios MD    Office Visit    1 year ago Small lymphocytic lymphoma ([facility])    [facility] Yan Palacios MD    Office Visit    1 year ago Gastroesophageal reflux disease, unspecified whether esophagitis present    [facility] Joshua Mohan MD    Office Visit

## 2024-12-11 NOTE — PROGRESS NOTES
[facility] - Gastroenterology                                                                                                  Clinic Progress Note    Chief Complaint   Patient presents with    Follow - Up     Frequent BM's throughout the day; does not feel fully emptied      HPI:   Sean Zheng is a 69 year old  man with hx of BMI 37, hypercholesterolemia, pulmonary hypertension, multiple listed musculoskeletal problems, tonsillectomy, inguinal hernia surgery, knee surgery, nasal septum surgery, BPH, kidney stones, sleep apnea, baldder cancer s/p surgery in September 2017 who is here for follow up      Was seen in March 2019 for a recent episode of LLQ pain which was new and lasting about 5 weeks daily feeling sharp/dull. Felt it in the car and maybe related to holding urine. There was no association with eating or bowel movements. Wasn't having diarrhea, bleeding, nausea/vomiting. Did take an antibiotic at that time for an ear problem. Eventually resolved and didn't returnn.     Seen in the office last in January 2021 feeling ok but had been having a sore throat issue for sometime. Taking his PPI. Denies any other abdominal symptoms. Did note significant weight gain.    He was last seen in the office in June 2023 for follow-up noting having some issues where he would feel like he was gagging.  Described as feeling sometimes put usually after further sip of martini.  His primary doctor had switched him from omeprazole to pantoprazole which she took for a month without improvement.  He denied any dysphagia, vomiting, unintentional weight loss.      Here today for follow-up.  Notes having issues with bowel movements over the last perhaps approximately 9 months since colonoscopy in early February.  Says usually having several bowel movements clustered in the morning with mushy type stool.  Says he will evacuate but feel  incompletely evacuated and then have to return to the bathroom soon after.  Denies any blood in the stool or diarrhea.  Has been taking Metamucil since the beginning of the year prior to this starting.  Did try a probiotic for symptom which did not help.  Denies any other new medications.  Denies any abdominal pain or unintentional weight loss.     Prior endoscopies:  Had a colonoscopy previously with Dr. Loo 7/11/2014 (report scanned in media section) for screening with MAC with a good prep noted for diverticulosis, hemorrhoids and recommendation for repeat in 5 years.  Underwent EGD and colonoscopy 3/20/2018 for GERD and colorectal cancer screening found to have mild non-specific gastropathy (h.pylori negative), fundic gland gastric polyps, two 1 cm right colon polyps (adenoma and hyperplastic), mild sigmoid colon diverticulosis, and hemorrhoids      Soc:  Daily ETOH use     Family Hx:  Esophageal cancer (father)    History, Medications, Allergies, ROS:      Past Medical History:    Asthma (HCC)    Bladder cancer (HCC)    surgery only     Calculus of kidney    Colon adenomas    #3    Colon adenomas    x3    Colon polyps    GERD (gastroesophageal reflux disease)    Gynecomastia    right    Hearing impairment    left ear, hearing aid    High blood pressure    High cholesterol    Hypercholesterolemia    Hyperlipidemia    Hypertensive disorder    Kidney stone    Lymphoma (HCC)    Dr. Segovia oncologist    Obesity    Osteoarthritis    Sleep apnea    wears mask at night    Urethral stricture    Visual impairment    glasses      Past Surgical History:   Procedure Laterality Date    Angioplasty (coronary)  May 4 2023    All clear    Breast biopsy Right 08/01/2014    [facility]    Colonoscopy  07/11/2014    [facility]    Colonoscopy  03/20/2018    Colonoscopy N/A 02/01/2021    Procedure: COLONOSCOPY;  Surgeon: Joshua Mohan MD;  Location: [facility] [place] ENDOSCOPY    Colonoscopy  02/07/2024    Cystouretero w/excise  tumor Left 2017    with stent and urethral dilitation    Egd  2018    Egd  2024    Endoscopic ultrasound - internal  2021    Excision turbinate,submucous      Hernia surgery  1999    Inguinal hernia repair Right 1999    Knee arthroscopy Left 2007    Knee surgery Right     Lipoma removal Bilateral 2014    thighs, [place]    Lipoma removal Left 2019    forearm    Lithotripsy  2017    Other surgical history      Repair of nasal septum      Skin surgery  2016    Tonsillectomy  1964      Family Hx:   Family History   Problem Relation Age of Onset    Cancer Father 70         of cancer    Diabetes Father     Obesity Father     Cancer Mother 70        breast    Breast Cancer Mother 70    Other (Other) Sister 60        lupus    Cancer Brother     Cancer Maternal Grandmother          of cancer    Cancer Sister         has lupus    Cancer Brother          of cancer      Social History:   Social History     Socioeconomic History    Marital status:     Number of children: 1   Occupational History    Occupation:      Employer: [employer]     Comment: retired    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   Tobacco Use    Smoking status: Never     Passive exposure: Never    Smokeless tobacco: Never    Tobacco comments:     Occasional cigar   Vaping Use    Vaping status: Never Used   Substance and Sexual Activity    Alcohol use: Yes     Alcohol/week: 10.0 standard drinks of alcohol     Types: 10 Standard drinks or equivalent per week     Comment: occasionally    Drug use: No     Social Drivers of Health      [facility]    Social Connections    [facility]    Housing Stability        Medications (Active prior to today's visit):  Current Outpatient Medications   Medication Sig Dispense Refill    allopurinol 100 MG Oral Tab Take 1 tablet  (100 mg total) by mouth 2 (two) times daily. 180 tablet 3    OMEPRAZOLE 40 MG Oral Capsule Delayed Release TAKE 1 CAPSULE BY MOUTH DAILY  BEFORE BREAKFAST 90 capsule 3    chlorhexidine gluconate 0.12 % Mouth/Throat Solution RINSE 1/2 OUNCE TWICE DAILY AFTER BREAKFAST AND BEFORE BEDTIME      isosorbide mononitrate ER 30 MG Oral Tablet 24 Hr Take 1 tablet (30 mg total) by mouth daily.      Saccharomyces boulardii (FLORASTOR OR)       atorvastatin 40 MG Oral Tab       Cobalamin Combinations (B-12) 1000-400 MCG Sublingual SL Tab       Azelastine HCl 137 MCG/SPRAY Nasal Solution       Cholecalciferol (D3 2000) 50 MCG (2000 UT) Oral Cap Take 2,000 Units by mouth.      montelukast 10 MG Oral Tab Take 1 tablet (10 mg total) by mouth daily.      amLODIPine 10 MG Oral Tab Take 1 tablet (10 mg total) by mouth daily.      MELOXICAM 15 MG Oral Tab TAKE 1 TABLET BY MOUTH DAILY 90 tablet 3    latanoprost 0.005 % Ophthalmic Solution Place 1 drop into both eyes nightly.      nebivolol 10 MG Oral Tab Take 1 tablet (10 mg total) by mouth daily.      fluticasone propionate 50 MCG/ACT Nasal Suspension 2 sprays by Nasal route daily. 16 g 3    Polyethyl Glycol-Propyl Glycol (SYSTANE OP) Apply to eye.      cetirizine 10 MG Oral Tab Take 1 tablet (10 mg total) by mouth daily.       Allergies:  Allergies[1]    ROS:   Systems were reviewed and were negative except as noted in the HPI    PHYSICAL EXAM:   Blood pressure 130/80, height 6' 3\" (1.905 m), weight 293 lb (132.9 kg).    General:awake, cooperative, no acute distress  HEENT: EOMI, no scleral icterus, MMM; oral pharnyx is without exudates or lesions  Neck: no lymphadenopathy; thyroid is not enlarged and without nodules  CV: RRR  Resp: non-labored breathing  Abd: soft, non-tender, non-distended  Ext: no lower extremity swelling  Neuro: Alert, Oriented X 3  Skin: no rashes, bruises  Psych: normal affect    Labs/Imaging:     Reviewed as noted in the HPI and A/P    ASSESSMENT/PLAN:   Sean  Bassam Zheng is a 69 year old  man with hx of BMI 37, hypercholesterolemia, pulmonary hypertension, multiple listed musculoskeletal problems, tonsillectomy, inguinal hernia surgery, knee surgery, nasal septum surgery, BPH, kidney stones, sleep apnea, baldder cancer s/p surgery in September 2017 who is here for follow up    Seen in February 2020 for LLQ pain thought be boil related though with prior inguinal hernia underwent CT A/P 2/14/20 showing development of mild subcutaneous edema in the left groin with overlying skin thickening. There were also findings of a stable small fat containing umbilical hernia, uncomplicated distal colonic diverticulosis, stable moderate hepatic steatosis, stable chronic sequela of remote ileocolitisInterval removal of the left nephroureteral stent, prominent peripelvic cysts are seen in both kidneys, which are without hydronephrosis or suspicious lesions, innumerable small retroperitoneal lymph nodes about the level of the kidneys that have slightly increased in size but are not pathologic by CT size criteria , nonspecific but probably reactive in nature. Was recommended he see general surgery which he did and then also underwent follow up with urology of these findings.      Underwent a CT urogram in December 2020 for follow up of his prior bladder cancer which noted significant increase in lymphadenopathy in the chest, abdomen, pelvis. Seen in the office in January 2021 and underwent EGD/EUS with FNA for GERD, abnormal CT abdomen, abdominal and mediastinal lymphadenopathy found to have para esophageal lymph nodes which were biopsied. A colonoscopy at that time for surveillance of adenomatous colon polyps with findings of 4 colon polyps removed, mild left colon diverticulosis and small hemorrhoids. Pathology from FNA was consistent with lymphoma.    He underwent upper endoscopy and colonoscopy in February 2024 for GERD, family history of esophageal cancer, colorectal cancer screening,  and history of adenomatous colon polyps found to have nonspecific gastric nodule and nodular erythema, multiple small benign-appearing gastric polyps, 6 diminutive colorectal polyps removed, mild left colon diverticulosis, small hemorrhoids.  3 of the colon polyps removed were adenomas.  Other biopsies were benign    Since being seen I reviewed blood testing from August 2024 which includes an unremarkable CBC and CMP.  There is also thyroid studies from May 2024 which are unremarkable.    We discussed the symptoms which seem most suggestive of a pelvic floor issue and incomplete evacuation.  At this time discussed trying to stop the Metamucil which preceded the symptoms.  Also discussed trying to improve evacuation with optimization using squatty potty.  We discussed if there is no improvement would consider tertiary evaluation of the pelvic floor issue and management.     Recommend:  -stop metamucil  -squatty potty  -consider tertiary referral pending above and clinical course  -continue omeprazole once daily  -routine colonoscopy February 2027    Orders This Visit:  No orders of the defined types were placed in this encounter.    Meds This Visit:  Requested Prescriptions      No prescriptions requested or ordered in this encounter     Imaging & Referrals:  None     MD [facility] - Gastroenterology  12/12/2024         [1] No Known Allergies

## 2024-12-12 ENCOUNTER — OFFICE VISIT (OUTPATIENT)
Dept: GASTROENTEROLOGY | Facility: CLINIC | Age: 69
End: 2024-12-12
Payer: COMMERCIAL

## 2024-12-12 ENCOUNTER — LAB ENCOUNTER (OUTPATIENT)
Dept: LAB | Age: 69
End: 2024-12-12
Attending: FAMILY MEDICINE
Payer: MEDICARE

## 2024-12-12 ENCOUNTER — OFFICE VISIT (OUTPATIENT)
Dept: FAMILY MEDICINE CLINIC | Facility: CLINIC | Age: 69
End: 2024-12-12
Payer: COMMERCIAL

## 2024-12-12 VITALS
HEIGHT: 75 IN | SYSTOLIC BLOOD PRESSURE: 130 MMHG | BODY MASS INDEX: 36.43 KG/M2 | DIASTOLIC BLOOD PRESSURE: 80 MMHG | WEIGHT: 293 LBS

## 2024-12-12 VITALS
SYSTOLIC BLOOD PRESSURE: 139 MMHG | HEART RATE: 71 BPM | HEIGHT: 75 IN | DIASTOLIC BLOOD PRESSURE: 79 MMHG | BODY MASS INDEX: 36.43 KG/M2 | OXYGEN SATURATION: 96 % | WEIGHT: 293 LBS

## 2024-12-12 DIAGNOSIS — R19.4 ALTERED BOWEL HABITS: Primary | ICD-10-CM

## 2024-12-12 DIAGNOSIS — M1A.09X0 IDIOPATHIC CHRONIC GOUT OF MULTIPLE SITES WITHOUT TOPHUS: ICD-10-CM

## 2024-12-12 DIAGNOSIS — J32.0 CHRONIC MAXILLARY SINUSITIS: Primary | ICD-10-CM

## 2024-12-12 DIAGNOSIS — R15.0 INCOMPLETE DEFECATION: ICD-10-CM

## 2024-12-12 LAB
CRP SERPL-MCNC: <0.4 MG/DL (ref ?–1)
ERYTHROCYTE [SEDIMENTATION RATE] IN BLOOD: 11 MM/HR

## 2024-12-12 PROCEDURE — 3075F SYST BP GE 130 - 139MM HG: CPT | Performed by: FAMILY MEDICINE

## 2024-12-12 PROCEDURE — 99214 OFFICE O/P EST MOD 30 MIN: CPT | Performed by: INTERNAL MEDICINE

## 2024-12-12 PROCEDURE — 3008F BODY MASS INDEX DOCD: CPT | Performed by: INTERNAL MEDICINE

## 2024-12-12 PROCEDURE — 86140 C-REACTIVE PROTEIN: CPT

## 2024-12-12 PROCEDURE — 36415 COLL VENOUS BLD VENIPUNCTURE: CPT

## 2024-12-12 PROCEDURE — 99214 OFFICE O/P EST MOD 30 MIN: CPT | Performed by: FAMILY MEDICINE

## 2024-12-12 PROCEDURE — 3079F DIAST BP 80-89 MM HG: CPT | Performed by: INTERNAL MEDICINE

## 2024-12-12 PROCEDURE — 1159F MED LIST DOCD IN RCRD: CPT | Performed by: FAMILY MEDICINE

## 2024-12-12 PROCEDURE — 1159F MED LIST DOCD IN RCRD: CPT | Performed by: INTERNAL MEDICINE

## 2024-12-12 PROCEDURE — 3078F DIAST BP <80 MM HG: CPT | Performed by: FAMILY MEDICINE

## 2024-12-12 PROCEDURE — 85652 RBC SED RATE AUTOMATED: CPT

## 2024-12-12 PROCEDURE — 3008F BODY MASS INDEX DOCD: CPT | Performed by: FAMILY MEDICINE

## 2024-12-12 PROCEDURE — 3075F SYST BP GE 130 - 139MM HG: CPT | Performed by: INTERNAL MEDICINE

## 2024-12-12 PROCEDURE — 1125F AMNT PAIN NOTED PAIN PRSNT: CPT | Performed by: FAMILY MEDICINE

## 2024-12-12 RX ORDER — PREDNISONE 5 MG/1
5 TABLET ORAL DAILY
Qty: 90 TABLET | Refills: 1 | Status: SHIPPED | OUTPATIENT
Start: 2024-12-12

## 2024-12-12 NOTE — PROGRESS NOTES
Subjective:   Sean Zheng is a 69 year old male who presents for Lightheadedness (Follow up on lightheadedness ) and Follow - Up (Blood pressure follow up )   Chronic lightheadedness.  Had sinus surgery and did help sinuses but lightheaded remains.     History/Other:    Chief Complaint Reviewed and Verified  Nursing Notes Reviewed and   Verified  Tobacco Reviewed  Allergies Reviewed  Medications Reviewed    Problem List Reviewed  Medical History Reviewed  Surgical History   Reviewed  Family History Reviewed  Social History Reviewed         Tobacco:  He has never smoked tobacco.    Current Outpatient Medications   Medication Sig Dispense Refill    predniSONE 5 MG Oral Tab Take 1 tablet (5 mg total) by mouth daily. 90 tablet 1    allopurinol 100 MG Oral Tab Take 1 tablet (100 mg total) by mouth 2 (two) times daily. 180 tablet 3    OMEPRAZOLE 40 MG Oral Capsule Delayed Release TAKE 1 CAPSULE BY MOUTH DAILY  BEFORE BREAKFAST 90 capsule 3    chlorhexidine gluconate 0.12 % Mouth/Throat Solution RINSE 1/2 OUNCE TWICE DAILY AFTER BREAKFAST AND BEFORE BEDTIME      isosorbide mononitrate ER 30 MG Oral Tablet 24 Hr Take 1 tablet (30 mg total) by mouth daily.      Saccharomyces boulardii (FLORASTOR OR)       atorvastatin 40 MG Oral Tab       Cobalamin Combinations (B-12) 1000-400 MCG Sublingual SL Tab       Azelastine HCl 137 MCG/SPRAY Nasal Solution       Cholecalciferol (D3 2000) 50 MCG (2000 UT) Oral Cap Take 2,000 Units by mouth.      montelukast 10 MG Oral Tab Take 1 tablet (10 mg total) by mouth daily.      amLODIPine 10 MG Oral Tab Take 1 tablet (10 mg total) by mouth daily.      latanoprost 0.005 % Ophthalmic Solution Place 1 drop into both eyes nightly.      nebivolol 10 MG Oral Tab Take 1 tablet (10 mg total) by mouth daily.      fluticasone propionate 50 MCG/ACT Nasal Suspension 2 sprays by Nasal route daily. 16 g 3    Polyethyl Glycol-Propyl Glycol (SYSTANE OP) Apply to eye.      cetirizine 10 MG Oral  Tab Take 1 tablet (10 mg total) by mouth daily.      MELOXICAM 15 MG Oral Tab TAKE 1 TABLET BY MOUTH DAILY (Patient not taking: Reported on 12/12/2024) 90 tablet 3         Review of Systems:  Review of Systems   HENT: Negative.     Respiratory: Negative.     Cardiovascular: Negative.    Neurological:  Positive for light-headedness. Negative for dizziness.     Lightheaded sensation most days .  No vertigo.       Objective:   /79   Pulse 71   Ht 6' 3\" (1.905 m)   Wt 293 lb (132.9 kg)   SpO2 96%   BMI 36.62 kg/m²  Estimated body mass index is 36.62 kg/m² as calculated from the following:    Height as of this encounter: 6' 3\" (1.905 m).    Weight as of this encounter: 293 lb (132.9 kg).  Physical Exam  Vitals reviewed.   Psychiatric:         Mood and Affect: Mood normal.         Behavior: Behavior normal.           Assessment & Plan:   1. Chronic maxillary sinusitis (Primary)  2. Idiopathic chronic gout of multiple sites without tophus  -     Sed Galilea Booker (Automated); Future; Expected date: 12/12/2024  -     C-Reactive Protein; Future; Expected date: 12/12/2024  Other orders  -     predniSONE; Take 1 tablet (5 mg total) by mouth daily.  Dispense: 90 tablet; Refill: 1    ENT surgery.  Now has oxygen with CPAP at night. Neither has relieved lightedeness. Jt notices that for weeks after he had steroid epidural and joint injections his sensation reesolved.  Gradually returns month later.  We will try low dose prednisone.  May take half dose if symptom resolves.  Check inflammatory markers.  Not diabetic.  Reviewed other potential side effect.  Do not take mobic only tylenol if needed.       Return in about 3 months (around 3/12/2025).    Kevin Armendariz, , 12/12/2024, 12:01 PM

## 2024-12-12 NOTE — PATIENT INSTRUCTIONS
Try stopping the metamcuil   Try utilizaing Squportillo Potty as we discussed  Let me know how you are doing over the next 2-4 weeks.   If no improvement, I'd like to have you see a gastroenterology with sub-specialist expertise in this problem from [facility] [place] for evaluation.

## 2025-01-17 ENCOUNTER — PATIENT MESSAGE (OUTPATIENT)
Dept: FAMILY MEDICINE CLINIC | Facility: CLINIC | Age: 70
End: 2025-01-17

## 2025-01-26 NOTE — PROGRESS NOTES
no h/o head & neck cancers  no h/o difficult intubations  no h/o malignant hyperthermia  no psuedocholinesterase deficiencies  no h/o active illicit drug use/opioid abuse  no home O2  yes h/o sleep apnea  no active alcoholics   no dialysis patients  no s Lives: with   ADLs: independent  Diet: no restrictions  Alcohol Use: denies  Tobacco Use: denies  Recreational Drug Use: denies

## 2025-01-29 ENCOUNTER — TELEPHONE (OUTPATIENT)
Dept: FAMILY MEDICINE CLINIC | Facility: CLINIC | Age: 70
End: 2025-01-29

## 2025-01-29 NOTE — TELEPHONE ENCOUNTER
Called patient to schedule Medicare Wellness visit but patient needed time to look over schedule. Patient will call to schedule appointment later.

## 2025-02-07 ENCOUNTER — TELEPHONE (OUTPATIENT)
Dept: FAMILY MEDICINE CLINIC | Facility: CLINIC | Age: 70
End: 2025-02-07

## 2025-02-07 NOTE — TELEPHONE ENCOUNTER
Patient is calling to advise he is scheduled for surgery on 4/7/25 - patient is requesting a call back    Knee replacement    [facility]  Dr. Elvis Rothman  Phone #   Does not the phone #    Patient is requesting if labs may be ordered ahead of time at his preferred site [facility] as this is closer to his home    Patient is requesting a call back

## 2025-02-07 NOTE — TELEPHONE ENCOUNTER
Spoke w/ Dr Rothman Pre-Op team- they do not send medical clearance request until 5 weeks out.     Pt scheduled pre-op exam 3/17 w/ Dr Armendariz.     Dr Armendariz once we receive clearance request will you place orders ahead of appointment?

## 2025-03-13 ENCOUNTER — TELEPHONE (OUTPATIENT)
Dept: FAMILY MEDICINE CLINIC | Facility: CLINIC | Age: 70
End: 2025-03-13

## 2025-03-13 NOTE — TELEPHONE ENCOUNTER
Selina from [facility] in [place] would like order for labs to be faxed to # [phone]. Patient is currently at labs. Please advise

## 2025-03-17 ENCOUNTER — OFFICE VISIT (OUTPATIENT)
Dept: FAMILY MEDICINE CLINIC | Facility: CLINIC | Age: 70
End: 2025-03-17
Payer: COMMERCIAL

## 2025-03-17 ENCOUNTER — EKG ENCOUNTER (OUTPATIENT)
Dept: LAB | Age: 70
End: 2025-03-17
Attending: FAMILY MEDICINE
Payer: MEDICARE

## 2025-03-17 VITALS
TEMPERATURE: 97 F | HEIGHT: 75 IN | DIASTOLIC BLOOD PRESSURE: 75 MMHG | BODY MASS INDEX: 37.18 KG/M2 | SYSTOLIC BLOOD PRESSURE: 123 MMHG | OXYGEN SATURATION: 94 % | WEIGHT: 299 LBS | RESPIRATION RATE: 22 BRPM | HEART RATE: 77 BPM

## 2025-03-17 DIAGNOSIS — M51.26 HERNIATED NUCLEUS PULPOSUS, L4-5 LEFT: ICD-10-CM

## 2025-03-17 DIAGNOSIS — Z01.812 ENCOUNTER FOR PRE-OPERATIVE LABORATORY TESTING: ICD-10-CM

## 2025-03-17 DIAGNOSIS — M17.12 PRIMARY OSTEOARTHRITIS OF LEFT KNEE: ICD-10-CM

## 2025-03-17 DIAGNOSIS — Z01.812 ENCOUNTER FOR PRE-OPERATIVE LABORATORY TESTING: Primary | ICD-10-CM

## 2025-03-17 LAB
ATRIAL RATE: 72 BPM
P AXIS: 52 DEGREES
P-R INTERVAL: 156 MS
Q-T INTERVAL: 410 MS
QRS DURATION: 96 MS
QTC CALCULATION (BEZET): 448 MS
R AXIS: 28 DEGREES
T AXIS: 55 DEGREES
VENTRICULAR RATE: 72 BPM

## 2025-03-17 PROCEDURE — 3008F BODY MASS INDEX DOCD: CPT | Performed by: FAMILY MEDICINE

## 2025-03-17 PROCEDURE — 93005 ELECTROCARDIOGRAM TRACING: CPT

## 2025-03-17 PROCEDURE — 1159F MED LIST DOCD IN RCRD: CPT | Performed by: FAMILY MEDICINE

## 2025-03-17 PROCEDURE — 3078F DIAST BP <80 MM HG: CPT | Performed by: FAMILY MEDICINE

## 2025-03-17 PROCEDURE — 3074F SYST BP LT 130 MM HG: CPT | Performed by: FAMILY MEDICINE

## 2025-03-17 PROCEDURE — 93010 ELECTROCARDIOGRAM REPORT: CPT | Performed by: INTERNAL MEDICINE

## 2025-03-17 PROCEDURE — 99214 OFFICE O/P EST MOD 30 MIN: CPT | Performed by: FAMILY MEDICINE

## 2025-03-17 PROCEDURE — 1160F RVW MEDS BY RX/DR IN RCRD: CPT | Performed by: FAMILY MEDICINE

## 2025-03-17 PROCEDURE — 1125F AMNT PAIN NOTED PAIN PRSNT: CPT | Performed by: FAMILY MEDICINE

## 2025-03-17 RX ORDER — TAMSULOSIN HYDROCHLORIDE 0.4 MG/1
0.8 CAPSULE ORAL DAILY
COMMUNITY
Start: 2025-02-25 | End: 2026-02-20

## 2025-03-17 RX ORDER — AMLODIPINE BESYLATE 5 MG/1
TABLET ORAL
COMMUNITY
Start: 2024-07-10

## 2025-03-17 NOTE — PROGRESS NOTES
Subjective:   Sean Zheng is a 69 year old male who presents for Pre-Op (Surgery on 4/7/25 with Dr Barraza for left total  knee arthroplasty )       History/Other:    Chief Complaint Reviewed and Verified  Nursing Notes Reviewed and   Verified  Allergies Reviewed  Medications Reviewed  Problem List   Reviewed         Tobacco:       Current Outpatient Medications   Medication Sig Dispense Refill    amLODIPine 5 MG Oral Tab       amoxicillin clavulanate 875-125 MG Oral Tab Take 1 tablet by mouth 2 (two) times daily with meals.      empagliflozin (JARDIANCE) 10 MG Oral Tab       tamsulosin 0.4 MG Oral Cap Take 2 capsules (0.8 mg total) by mouth daily.      predniSONE 5 MG Oral Tab Take 1 tablet (5 mg total) by mouth daily. 90 tablet 1    allopurinol 100 MG Oral Tab Take 1 tablet (100 mg total) by mouth 2 (two) times daily. 180 tablet 3    OMEPRAZOLE 40 MG Oral Capsule Delayed Release TAKE 1 CAPSULE BY MOUTH DAILY  BEFORE BREAKFAST 90 capsule 3    atorvastatin 40 MG Oral Tab       Cobalamin Combinations (B-12) 1000-400 MCG Sublingual SL Tab       Azelastine HCl 137 MCG/SPRAY Nasal Solution       Cholecalciferol (D3 2000) 50 MCG (2000 UT) Oral Cap Take 2,000 Units by mouth.      montelukast 10 MG Oral Tab Take 1 tablet (10 mg total) by mouth daily.      latanoprost 0.005 % Ophthalmic Solution Place 1 drop into both eyes nightly.      nebivolol 10 MG Oral Tab Take 1 tablet (10 mg total) by mouth daily.      fluticasone propionate 50 MCG/ACT Nasal Suspension 2 sprays by Nasal route daily. 16 g 3    Polyethyl Glycol-Propyl Glycol (SYSTANE OP) Apply to eye.      cetirizine 10 MG Oral Tab Take 1 tablet (10 mg total) by mouth daily.      chlorhexidine gluconate 0.12 % Mouth/Throat Solution RINSE 1/2 OUNCE TWICE DAILY AFTER BREAKFAST AND BEFORE BEDTIME (Patient not taking: Reported on 3/17/2025)           Review of Systems:  Review of Systems   Constitutional: Negative.    HENT:  Positive for sinus pressure and sinus  pain.    Eyes: Negative.    Respiratory: Negative.     Cardiovascular: Negative.    Gastrointestinal: Negative.    Endocrine: Negative for polydipsia, polyphagia and polyuria.   Genitourinary: Negative.    Musculoskeletal:  Positive for arthralgias and back pain.   Skin: Negative.         No mole changes   Allergic/Immunologic: Negative for environmental allergies.   Neurological:  Negative for dizziness, weakness, numbness and headaches.   Hematological: Negative.    Psychiatric/Behavioral:  Negative for sleep disturbance.         No anxiety or depressed feelings         Objective:   /75   Pulse 77   Temp 97.2 °F (36.2 °C) (Temporal)   Resp 22   Ht 6' 3\" (1.905 m)   Wt 299 lb (135.6 kg)   SpO2 94%   BMI 37.37 kg/m²  Estimated body mass index is 37.37 kg/m² as calculated from the following:    Height as of this encounter: 6' 3\" (1.905 m).    Weight as of this encounter: 299 lb (135.6 kg).  Physical Exam  Vitals reviewed.   Constitutional:       Appearance: Normal appearance. He is well-developed. He is obese.   HENT:      Head: Normocephalic.      Right Ear: Tympanic membrane, ear canal and external ear normal.      Left Ear: Tympanic membrane, ear canal and external ear normal.      Nose: Nose normal.   Eyes:      General: Lids are normal.      Conjunctiva/sclera: Conjunctivae normal.      Pupils: Pupils are equal, round, and reactive to light.      Funduscopic exam:     Right eye: No hemorrhage or papilledema.         Left eye: No hemorrhage or papilledema.   Neck:      Vascular: Normal carotid pulses. No JVD.      Trachea: Trachea normal.   Cardiovascular:      Rate and Rhythm: Regular rhythm.      Pulses:           Carotid pulses are 2+ on the right side and 2+ on the left side.       Radial pulses are 2+ on the right side and 2+ on the left side.      Heart sounds: Normal heart sounds.   Pulmonary:      Breath sounds: Normal breath sounds.   Abdominal:      Tenderness: There is no abdominal  tenderness.   Musculoskeletal:      Cervical back: Normal, normal range of motion and neck supple.      Thoracic back: Normal.      Lumbar back: Normal.   Lymphadenopathy:      Cervical: No cervical adenopathy.   Skin:     Comments: No suspicious lesions waist up exam   Neurological:      General: No focal deficit present.      Mental Status: He is alert and oriented to person, place, and time.      Sensory: No sensory deficit.      Deep Tendon Reflexes: Reflexes are normal and symmetric.   Psychiatric:         Mood and Affect: Mood normal. Mood is not anxious or depressed.           Assessment & Plan:   1. Encounter for pre-operative laboratory testing (Primary)  2. Primary osteoarthritis of left knee  3. Herniated nucleus pulposus, L4-5 left    Having a lumbar epidural few weeks prior to knee replacement.  Labs reviewed and acceptable.  ECG pending.  Will review.        No follow-ups on file.    Kevin Armendariz DO, 3/17/2025, 12:43 PM

## 2025-03-25 ENCOUNTER — TELEPHONE (OUTPATIENT)
Dept: FAMILY MEDICINE CLINIC | Facility: CLINIC | Age: 70
End: 2025-03-25

## 2025-03-25 ENCOUNTER — MED REC SCAN ONLY (OUTPATIENT)
Dept: FAMILY MEDICINE CLINIC | Facility: CLINIC | Age: 70
End: 2025-03-25

## 2025-03-26 ENCOUNTER — PATIENT MESSAGE (OUTPATIENT)
Dept: FAMILY MEDICINE CLINIC | Facility: CLINIC | Age: 70
End: 2025-03-26

## 2025-03-26 NOTE — TELEPHONE ENCOUNTER
Magali umaña from [facility]. Looking for pre-op clearance paperwork. Magali stated she did see that fax was received yesterday and verified all pre-op paperwork was received

## 2025-04-10 ENCOUNTER — TELEPHONE (OUTPATIENT)
Dept: FAMILY MEDICINE CLINIC | Facility: CLINIC | Age: 70
End: 2025-04-10

## 2025-04-10 NOTE — TELEPHONE ENCOUNTER
Patient just had surgery and would like to schedule in the fall , Also asked that when contacted to not ask for him first, but to ID ourselves

## 2025-05-27 ENCOUNTER — TELEPHONE (OUTPATIENT)
Dept: FAMILY MEDICINE CLINIC | Facility: CLINIC | Age: 70
End: 2025-05-27

## 2025-05-27 RX ORDER — PREDNISONE 5 MG/1
5 TABLET ORAL DAILY
Qty: 90 TABLET | Refills: 1 | Status: SHIPPED | OUTPATIENT
Start: 2025-05-27

## 2025-06-19 PROBLEM — Z85.51 HISTORY OF BLADDER CANCER: Status: ACTIVE | Noted: 2025-06-19

## 2025-06-19 PROBLEM — C67.0 MALIGNANT NEOPLASM OF TRIGONE OF URINARY BLADDER (HCC): Status: RESOLVED | Noted: 2021-11-01 | Resolved: 2025-06-19

## 2025-06-30 ENCOUNTER — OFFICE VISIT (OUTPATIENT)
Dept: FAMILY MEDICINE CLINIC | Facility: CLINIC | Age: 70
End: 2025-06-30
Payer: COMMERCIAL

## 2025-06-30 VITALS
SYSTOLIC BLOOD PRESSURE: 127 MMHG | HEIGHT: 75 IN | OXYGEN SATURATION: 95 % | RESPIRATION RATE: 18 BRPM | DIASTOLIC BLOOD PRESSURE: 79 MMHG | TEMPERATURE: 97 F | HEART RATE: 68 BPM | WEIGHT: 292 LBS | BODY MASS INDEX: 36.31 KG/M2

## 2025-06-30 DIAGNOSIS — J32.0 CHRONIC MAXILLARY SINUSITIS: ICD-10-CM

## 2025-06-30 DIAGNOSIS — R35.1 BENIGN PROSTATIC HYPERPLASIA WITH NOCTURIA: ICD-10-CM

## 2025-06-30 DIAGNOSIS — R60.0 LOCALIZED EDEMA: ICD-10-CM

## 2025-06-30 DIAGNOSIS — M51.26 HERNIATED NUCLEUS PULPOSUS, L4-5 LEFT: ICD-10-CM

## 2025-06-30 DIAGNOSIS — K21.00 GASTROESOPHAGEAL REFLUX DISEASE WITH ESOPHAGITIS WITHOUT HEMORRHAGE: ICD-10-CM

## 2025-06-30 DIAGNOSIS — M51.16 HERNIATION OF LUMBAR INTERVERTEBRAL DISC WITH RADICULOPATHY: ICD-10-CM

## 2025-06-30 DIAGNOSIS — M1A.09X0 IDIOPATHIC CHRONIC GOUT OF MULTIPLE SITES WITHOUT TOPHUS: Primary | ICD-10-CM

## 2025-06-30 DIAGNOSIS — N62 GYNECOMASTIA: ICD-10-CM

## 2025-06-30 DIAGNOSIS — N40.1 BENIGN PROSTATIC HYPERPLASIA WITH NOCTURIA: ICD-10-CM

## 2025-06-30 DIAGNOSIS — J45.20 MILD INTERMITTENT ASTHMA WITHOUT COMPLICATION (HCC): ICD-10-CM

## 2025-06-30 DIAGNOSIS — M54.50 CHRONIC BILATERAL LOW BACK PAIN WITHOUT SCIATICA: ICD-10-CM

## 2025-06-30 DIAGNOSIS — G89.29 CHRONIC BILATERAL LOW BACK PAIN WITHOUT SCIATICA: ICD-10-CM

## 2025-06-30 DIAGNOSIS — Z80.42 FAMILY HISTORY OF PROSTATE CANCER: ICD-10-CM

## 2025-06-30 PROBLEM — R94.2 RESTRICTIVE PATTERN PRESENT ON PULMONARY FUNCTION TESTING: Status: ACTIVE | Noted: 2024-08-27

## 2025-06-30 PROBLEM — C83.00 SMALL LYMPHOCYTIC LYMPHOMA (HCC): Status: ACTIVE | Noted: 2024-08-19

## 2025-06-30 RX ORDER — ACETAMINOPHEN 325 MG/1
650 TABLET ORAL EVERY 6 HOURS
COMMUNITY

## 2025-06-30 RX ORDER — TRIAMCINOLONE ACETONIDE 40 MG/ML
INJECTION, SUSPENSION INTRA-ARTICULAR; INTRAMUSCULAR
COMMUNITY
Start: 2025-06-26

## 2025-06-30 NOTE — PROGRESS NOTES
Subjective:   Sean Zheng is a 70 year old male who presents for a MA AHA (Medicare Advantage Annual Health Assessment) and scheduled follow up of multiple significant but stable problems.   History of Present Illness      History/Other:   Fall Risk Assessment:   He has been screened for Falls and is low risk.      Cognitive Assessment:   He had a completely normal cognitive assessment - see flowsheet entries     Functional Ability/Status:   Sean Zheng has some abnormal functions as listed below:  He has Walking problems based on screening of functional status.       Depression Screening (PHQ):  PHQ-2 SCORE: 0  , done 6/30/2025             Advanced Directives:   He does NOT have a Living Will. [Do you have a living will?: No]  He does NOT have a Power of  for Health Care. [Do you have a healthcare power of ?: No]  Discussed Advance Care Planning with patient (and family/surrogate if present). Standard forms made available to patient in After Visit Summary.      Problem List[1]  Allergies:  He has no known allergies.    Current Medications:  Active Meds, Sig Only[2]    Medical History:  He  has a past medical history of Asthma (HCC), Bladder cancer ([facility]) (2017), Calculus of kidney, Colon adenomas (02/01/2021), Colon adenomas (02/07/2024), Colon polyps, GERD (gastroesophageal reflux disease), Gynecomastia (2014), Hearing impairment, High blood pressure, High cholesterol, Hypercholesterolemia (09/10/2020), Hyperlipidemia (2022), Hypertensive disorder (05/25/2023), Kidney stone, Lymphoma (HCC) (2022), Obesity, Osteoarthritis, Sleep apnea, Urethral stricture, and Visual impairment.  Surgical History:  He  has a past surgical history that includes colonoscopy (07/11/2014); knee arthroscopy (Left, 2007); Inguinal hernia repair (Right, 1999); Breast biopsy (Right, 08/01/2014); lipoma removal (Bilateral, 08/01/2014); tonsillectomy (1964); knee surgery (Right); cystouretero w/excise tumor (Left,  09/2017); lithotripsy (09/2017); colonoscopy (03/20/2018); egd (03/20/2018); lipoma removal (Left, 2019); repair of nasal septum; excision turbinate,submucous; other surgical history; colonoscopy (N/A, 02/01/2021); endoscopic ultrasound - internal (02/01/2021); angioplasty (coronary) (May 4 2023); hernia surgery (1999); skin surgery (2016); egd (02/07/2024); colonoscopy (02/07/2024); and knee surgery (04/07/2025).   Family History:  His family history includes Breast Cancer (age of onset: 70) in his mother; Cancer in his brother, brother, maternal grandmother, and sister; Cancer (age of onset: 70) in his father and mother; Diabetes in his father; Obesity in his father; Other (age of onset: 60) in his sister.  Social History:  He  reports that he has never smoked. He has never been exposed to tobacco smoke. He has never used smokeless tobacco. He reports current alcohol use of about 10.0 standard drinks of alcohol per week. He reports that he does not use drugs.    Tobacco:  He has never smoked tobacco.    CAGE Alcohol Screen:   CAGE screening score of 0 on 6/30/2025, showing low risk of alcohol abuse.      Patient Care Team:  Kevin Armendariz DO as PCP - General (Family Medicine)  Eric Holland DO (Cardiovascular Diseases)    Review of Systems   Constitutional: Negative.    HENT: Negative.     Eyes: Negative.    Respiratory: Negative.     Cardiovascular: Negative.    Gastrointestinal: Negative.    Endocrine: Negative for polydipsia, polyphagia and polyuria.   Genitourinary: Negative.    Musculoskeletal:  Positive for arthralgias.   Skin: Negative.         No mole changes   Allergic/Immunologic: Negative for environmental allergies.   Neurological:  Negative for dizziness, weakness, numbness and headaches.   Hematological: Negative.    Psychiatric/Behavioral:  Negative for sleep disturbance.         No anxiety or depressed feelings          Objective:   Physical Exam  Vitals reviewed.   Constitutional:        Appearance: Normal appearance. He is well-developed.   HENT:      Head: Normocephalic.      Right Ear: Tympanic membrane, ear canal and external ear normal.      Left Ear: Tympanic membrane, ear canal and external ear normal.      Nose: Nose normal.   Eyes:      General: Lids are normal.      Conjunctiva/sclera: Conjunctivae normal.      Pupils: Pupils are equal, round, and reactive to light.      Funduscopic exam:     Right eye: No hemorrhage or papilledema.         Left eye: No hemorrhage or papilledema.   Neck:      Vascular: Normal carotid pulses. No JVD.      Trachea: Trachea normal.   Cardiovascular:      Rate and Rhythm: Regular rhythm.      Pulses:           Carotid pulses are 2+ on the right side and 2+ on the left side.       Radial pulses are 2+ on the right side and 2+ on the left side.      Heart sounds: Normal heart sounds.   Pulmonary:      Breath sounds: Normal breath sounds.   Abdominal:      Tenderness: There is no abdominal tenderness.   Musculoskeletal:      Cervical back: Normal, normal range of motion and neck supple.      Thoracic back: Normal.      Lumbar back: Normal.      Left knee: Decreased range of motion.      Comments: S/p left total knee replacement   Lymphadenopathy:      Cervical: No cervical adenopathy.   Skin:     Comments: No suspicious lesions waist up exam   Neurological:      General: No focal deficit present.      Mental Status: He is alert and oriented to person, place, and time.      Sensory: No sensory deficit.      Deep Tendon Reflexes: Reflexes are normal and symmetric.   Psychiatric:         Mood and Affect: Mood normal. Mood is not anxious or depressed.         /79   Pulse 68   Temp 97 °F (36.1 °C) (Temporal)   Resp 18   Ht 6' 3\" (1.905 m)   Wt 292 lb (132.5 kg)   SpO2 95%   BMI 36.50 kg/m²  Estimated body mass index is 36.5 kg/m² as calculated from the following:    Height as of this encounter: 6' 3\" (1.905 m).    Weight as of this encounter: 292 lb  (132.5 kg).    Medicare Hearing Assessment:   Hearing Screening    Screening Method: Finger Rub  Finger Rub Result: Pass               Assessment & Plan:   Sean Zheng is a 70 year old male who presents for a Medicare Assessment.     1. Idiopathic chronic gout of multiple sites without tophus (Primary)  -     Uric Acid; Future; Expected date: 06/30/2025  After getting uric acid level he may consider going down to once a day allopurinol    2. Mild intermittent asthma without complication (HCC)  Stable and also sees a pulmonologist currently has as needed oxygen therapy at home    3. Benign prostatic hyperplasia with nocturia  Stable but considering interventional treatment    4. Chronic bilateral low back pain without sciatica  Stable at this time no recent flareup    5. Chronic maxillary sinusitis  May continue present medication at this time has chronic sinusitis considering seeing another specialist lightheadedness and dizziness is resolved    6. Localized edema  Controlled and stable at this time    8. Gastroesophageal reflux disease with esophagitis without hemorrhage  Controlled at this time he has really elected to stop medication because within 24 hours he gets symptoms back    9. Gynecomastia  Asymptomatic no treatment    10. Herniated nucleus pulposus, L4-5 left  Asymptomatic this time with occasional low back pain    11. Herniation of lumbar intervertebral disc with radiculopathy  Radiculopathy has resolved  Assessment & Plan    The patient indicates understanding of these issues and agrees to the plan.  Lab work ordered.  Reinforced healthy diet, lifestyle, and exercise.      No follow-ups on file.     Kevin Armendariz DO, 6/30/2025     Supplementary Documentation:   General Health:  In the past six months, have you lost more than 10 pounds without trying?: 2 - No  Has your appetite been poor?: No  Type of Diet: Balanced  How does the patient maintain a good energy level?: Other  How would you  describe your daily physical activity?: Moderate  How would you describe your current health state?: Good  How do you maintain positive mental well-being?: Social Interaction, Visiting Friends, Visiting Family  On a scale of 0 to 10, with 0 being no pain and 10 being severe pain, what is your pain level?: (Patient-Rptd) 4 - (Moderate)  In the past six months, have you experienced urine leakage?: 1-Yes  At any time do you feel concerned for the safety/well-being of yourself and/or your children, in your home or elsewhere?: No  Have you had any immunizations at another office such as Influenza, Hepatitis B, Tetanus, or Pneumococcal?: Yes    Health Maintenance   Topic Date Due    Annual Well Visit  01/01/2025    COVID-19 Vaccine (9 - 2024-25 season) 05/06/2025    PSA  05/08/2026    Colorectal Cancer Screening  02/07/2027    Influenza Vaccine  Completed    Annual Depression Screening  Completed    Fall Risk Screening (Annual)  Completed    Pneumococcal Vaccine: 50+ Years  Completed    Zoster Vaccines  Completed    Meningococcal B Vaccine  Aged Out            [1]   Patient Active Problem List  Diagnosis    Nephrolithiasis    Hydronephrosis, left    Microhematuria    Benign prostatic hyperplasia with nocturia    Family history of prostate cancer    Severe obesity (BMI 35.0-39.9) with comorbidity (HCC)    Nasal septal deviation    Hypercholesterolemia    Sensorineural hearing loss (SNHL) of left ear with unrestricted hearing of right ear    Gastroesophageal reflux disease    Chronic bilateral low back pain without sciatica    Lumbosacral spondylosis without myelopathy    Herniated nucleus pulposus, L4-5 left    JOHN (obstructive sleep apnea)    Pulmonary hypertension (HCC)    Chronic gout of multiple sites    Asthma (HCC)    Hypertensive disorder    Gynecomastia    Edema    Idiopathic sleep related nonobstructive alveolar hypoventilation    Malignant lymphoma (HCC)    Chronic maxillary sinusitis    History of bladder cancer     Herniation of lumbar intervertebral disc with radiculopathy    Small lymphocytic lymphoma (HCC)    Restrictive pattern present on pulmonary function testing   [2]   Outpatient Medications Marked as Taking for the 6/30/25 encounter (Office Visit) with Kevin Armendariz DO   Medication Sig    acetaminophen 325 MG Oral Tab Take 2 tablets (650 mg total) by mouth every 6 (six) hours.    triamcinolone acetonide 40 MG/ML Injection Suspension right knee injection    predniSONE 5 MG Oral Tab Take 1 tablet (5 mg total) by mouth daily.    amLODIPine 5 MG Oral Tab     empagliflozin (JARDIANCE) 10 MG Oral Tab     tamsulosin 0.4 MG Oral Cap Take 2 capsules (0.8 mg total) by mouth daily.    allopurinol 100 MG Oral Tab Take 1 tablet (100 mg total) by mouth 2 (two) times daily.    OMEPRAZOLE 40 MG Oral Capsule Delayed Release TAKE 1 CAPSULE BY MOUTH DAILY  BEFORE BREAKFAST    chlorhexidine gluconate 0.12 % Mouth/Throat Solution RINSE 1/2 OUNCE TWICE DAILY AFTER BREAKFAST AND BEFORE BEDTIME    atorvastatin 40 MG Oral Tab     Cobalamin Combinations (B-12) 1000-400 MCG Sublingual SL Tab     Azelastine HCl 137 MCG/SPRAY Nasal Solution     Cholecalciferol (D3 2000) 50 MCG (2000 UT) Oral Cap Take 2,000 Units by mouth.    montelukast 10 MG Oral Tab Take 1 tablet (10 mg total) by mouth daily.    latanoprost 0.005 % Ophthalmic Solution Place 1 drop into both eyes nightly.    nebivolol 10 MG Oral Tab Take 1 tablet (10 mg total) by mouth daily.    fluticasone propionate 50 MCG/ACT Nasal Suspension 2 sprays by Nasal route daily.    Polyethyl Glycol-Propyl Glycol (SYSTANE OP) Apply to eye.    cetirizine 10 MG Oral Tab Take 1 tablet (10 mg total) by mouth daily.

## 2025-07-24 RX ORDER — OMEPRAZOLE 40 MG/1
40 CAPSULE, DELAYED RELEASE ORAL
Qty: 90 CAPSULE | Refills: 3 | Status: SHIPPED | OUTPATIENT
Start: 2025-07-24

## 2025-07-24 NOTE — TELEPHONE ENCOUNTER
Last Office Visit: 12/12/2024       Last Refill: 6/24/2024    Procedure:  Colonoscopy and Esophagogastroduodenoscopy (EGD)  02/07/2024     Requested Prescriptions     Pending Prescriptions Disp Refills    OMEPRAZOLE 40 MG Oral Capsule Delayed Release [Pharmacy Med Name: Omeprazole 40 MG Oral Capsule Delayed Release] 90 capsule 3     Sig: TAKE 1 CAPSULE BY MOUTH DAILY  BEFORE BREAKFAST

## (undated) DIAGNOSIS — Z01.818 PREOP EXAMINATION: Primary | ICD-10-CM

## (undated) DIAGNOSIS — Z11.59 SCREENING FOR VIRAL DISEASE: ICD-10-CM

## (undated) DEVICE — CLIP RESOLUTION 235CM

## (undated) DEVICE — Device

## (undated) DEVICE — STERILE POLYISOPRENE POWDER-FREE SURGICAL GLOVES: Brand: PROTEXIS

## (undated) DEVICE — ISOVUE 300 10X100ML VIAL

## (undated) DEVICE — NITINOL WIRE STR 035

## (undated) DEVICE — SNARE OPTMZ PLPCTM TRP

## (undated) DEVICE — LUBRICANT JLY SURGILUBE 2OZ

## (undated) DEVICE — ENDOSCOPY PACK UPPER: Brand: MEDLINE INDUSTRIES, INC.

## (undated) DEVICE — BLADE SHVR 4.3MM 13CM QUADCUT STRAIGHTSHOT M4

## (undated) DEVICE — STANDARD HYPODERMIC NEEDLE,ALUMINUM HUB: Brand: MONOJECT

## (undated) DEVICE — SNARE CAPTIFLEX MICRO-OVL OLY

## (undated) DEVICE — HEAD & NECK: Brand: MEDLINE INDUSTRIES, INC.

## (undated) DEVICE — SEEKER SURG 17MM 6MM 70D FRNTL BLN

## (undated) DEVICE — UROLOGY DRAIN BAG STERILE

## (undated) DEVICE — MATTRESS PT HOVERMATT 1/2L 34W

## (undated) DEVICE — TRACKER NVG EMGN ENT STRL LF DISP

## (undated) DEVICE — SUTURE ETHILON 3-0 669H

## (undated) DEVICE — 35 ML SYRINGE REGULAR TIP: Brand: MONOJECT

## (undated) DEVICE — SOL  .9 1000ML BTL

## (undated) DEVICE — NASAL ACCESSORY: Brand: MEDLINE INDUSTRIES, INC.

## (undated) DEVICE — SUTURE CHROMIC GUT 4-0 P-3

## (undated) DEVICE — SUCTION CANISTER, 3000CC,SAFELINER: Brand: DEROYAL

## (undated) DEVICE — CATH SECURING DEVICE STATLOCK

## (undated) DEVICE — CATH URTH BDX IC 20FR FL 3

## (undated) DEVICE — SUTURE VICRYL MTPS 4-0 PS2 18IN BRAID COAT ABS UNDYED J496H

## (undated) DEVICE — DRESSING NSL 8CM BIORESBL FIRM PK NASOPORE PLMR STRL LF

## (undated) DEVICE — TRACKER NVG FSN AXIEM NINVS STRL LF DISP

## (undated) DEVICE — EYE PADSSTERILENOT MADE WITH NATURAL RUBBER LATEXSINGLE USE ONLYDO NOT USE IF PACKAGE OPENED OR DAMAGED: Brand: CARDINAL HEALTH

## (undated) DEVICE — 6 ML SYRINGE LUER-LOCK TIP: Brand: MONOJECT

## (undated) DEVICE — REM POLYHESIVE ADULT PATIENT RETURN ELECTRODE: Brand: VALLEYLAB

## (undated) DEVICE — SHEETING SIL 2X2IN THK.04IN

## (undated) DEVICE — SUTURE 4-0 SC-1 18IN PLN 2 ARM MONO ABS YELLOWISH TAN 1828H

## (undated) DEVICE — NEEDLE CONTRAST INTERJECT 25G

## (undated) DEVICE — SUTURE 4-0 RB1 27IN CR MONO ABS BRN U203H

## (undated) DEVICE — GLOVE SURG 6.5 PROTEXIS LF BLUE PF SMTH BEAD CUFF INTLK STRL

## (undated) DEVICE — Device: Brand: DEFENDO AIR/WATER/SUCTION AND BIOPSY VALVE

## (undated) DEVICE — FORCEP RADIAL JAW 4

## (undated) DEVICE — GLOVE SURG 6 PROTEXIS LF CRM PF BEAD CUFF STRL PLISPRN 11.3

## (undated) DEVICE — SOL H2O 3000ML IRRIG

## (undated) DEVICE — CONMED SCOPE SAVER BITE BLOCK, 20X27 MM: Brand: SCOPE SAVER

## (undated) DEVICE — GAMMEX® PI HYBRID SIZE 7, STERILE POWDER-FREE SURGICAL GLOVE, POLYISOPRENE AND NEOPRENE BLEND: Brand: GAMMEX

## (undated) DEVICE — SOL  .9 3000ML

## (undated) DEVICE — STERILE LATEX POWDER-FREE SURGICAL GLOVESWITH NITRILE COATING: Brand: PROTEXIS

## (undated) DEVICE — REFLEX ULTRA 45 WITH INTEGRATED CABLE: Brand: COBLATION

## (undated) DEVICE — Device: Brand: CUSTOM PROCEDURE KIT

## (undated) DEVICE — NON-ADHERENT PAD PREPACK: Brand: TELFA

## (undated) DEVICE — BLANKET WRM LWR BODY ADULT 60X36IN BR HGR PLMR FT DRAPE ADH

## (undated) DEVICE — ENDOSCOPY PACK - LOWER: Brand: MEDLINE INDUSTRIES, INC.

## (undated) DEVICE — SOLO FLEX HYBRID GUIDEWIRE .03

## (undated) DEVICE — WAND ESURG BLK RFLX UL 45 LONG SIL INSULATE SHAFT SPCR TRBNT

## (undated) DEVICE — KENDALL SCD EXPRESS FOOT CUFF, MEDIUM: Brand: KENDALL SCD

## (undated) DEVICE — ENDOSCOPIC ULTRASOUND ASPIRATION NEEDLE: Brand: EXPECT SLIMLINE SL

## (undated) DEVICE — CATH URET CONE TIP 8FR 138008

## (undated) DEVICE — LINE MNTR ADLT SET O2 INTMD

## (undated) DEVICE — CYSTO PACK: Brand: MEDLINE INDUSTRIES, INC.

## (undated) DEVICE — URINE DRAINAGE BAG,NEEDLE SAMPLING, ANTI-REFLUX DEVICE, DRAIN TUBE: Brand: DOVER

## (undated) DEVICE — BLADE SHVR 4MM 11CM 360D 5K RPM RAD 60 STRAIGHTSHOT SINUS M4

## (undated) DEVICE — PLASTC TOOMEY SYRNG DISP

## (undated) DEVICE — DEVICE ACCLARENT SE TUBE PRSS GA VAC LN INFL BL SNPL DISP

## (undated) DEVICE — SPLINT NSL THK.5MM FLRPLAS CLASSIC SMTH GEL REUT

## (undated) DEVICE — KIT BLN INFLTR

## (undated) DEVICE — SYSTEM BLN RELIEVA SPIN + 6MM 16MM LIGHT WIRE INTEGRATE

## (undated) DEVICE — SOL H2O 1000ML BTL

## (undated) DEVICE — BLADE SHVR 2.9MM 11CM INFR TRBNT M4 ROTATE ELEVR STRGT SHAFT

## (undated) NOTE — LETTER
No referring provider defined for this encounter. 02/16/21        Patient: Estephanie Quiñones   YOB: 1955   Date of Visit: 2/16/2021       Dear  Dr. Nilo Leigh, DO,      Thank you for referring Estephanie Quiñones to my practice.   Please find

## (undated) NOTE — LETTER
No referring provider defined for this encounter. 01/09/21        Patient: Citlaly Petit   YOB: 1955   Date of Visit: 1/8/2021       Dear  Dr. Camilla Darby, DO,      Thank you for referring Citlaly Petit to my practice.   Please find

## (undated) NOTE — LETTER
No referring provider defined for this encounter. 11/13/17        Patient: Navya Campos   YOB: 1954   Date of Visit: 11/13/2017       Dear  Dr. Aleksandra Diaz,      Thank you for referring Navya Campos to my practice.   Please find my

## (undated) NOTE — LETTER
[address] [place] [address] Rd, [place], [place]     AUTHORIZATION FOR SURGICAL OPERATION OR PROCEDURE    1.  I hereby authorize Dr. Anna Nazario MD, my Physician(s) and whomever may be designated as the doctor's Assistant, to perform th 5. I consent to the photographing of procedure(s) to be performed for the purposes of advancing medicine, science and/or education, provided my identity is not revealed.  If the procedure has been videotaped, the physician/surgeon will obtain the original v (Witness signature)                                                                                                  (Date)                                (Time)  STATEMENT OF PHYSICIAN My signature below affirms that prior to the time of the procedure;  I

## (undated) NOTE — Clinical Note
Reji Schmidt,    I saw Mr. Maya Cardoso today for follow up. He is supposed to undergo colonoscopy for follow up of polyps. I discussed his new noted lymphadenopathy.  I discussed we can plan for EUS/FNA of the lymph nodes as at least one is accessible base

## (undated) NOTE — LETTER
[address] [place] [address]  [address]  Phone: [phone]  Fax: [phone]       Hello,     This is the [facility], office of Dr. Barry Armendariz.    Thank you for putting your trust in [facility].  Our goal is to deliver the highest quality healthcare and an exceptional patient experience. Review of your medical record shows you are due for the following:         Annual Medicare Physical       Please call [phone] to schedule your appointment or schedule online via [employer].     If you changed to a new provider at another facility, please notify the clinic to update your records.    If you had any recent testing at another facility, please have your results faxed to our office at [phone].     Thank you and have a great day!

## (undated) NOTE — LETTER
[facility], [facility], [address]  [phone]                03/21/18      [facility] [address] [address]        Dear Nona Romero,    I reviewed the [facility]

## (undated) NOTE — LETTER
No referring provider defined for this encounter. 02/17/20        Patient: Iram Pandya   YOB: 1955   Date of Visit: 2/17/2020       Dear  Dr. Carmen Diaz, DO,      Thank you for referring Iram Pandya to my practice.   Please find

## (undated) NOTE — LETTER
No referring provider defined for this encounter. 03/06/19        Patient: Iram Pandya   YOB: 1955   Date of Visit: 3/5/2019       Dear  Dr. Carmen Diaz, DO,      Thank you for referring Iram Pandya to my practice.   Please find

## (undated) NOTE — LETTER
AUTHORIZATION FOR SURGICAL OPERATION OR OTHER PROCEDURE    1.  I hereby authorize Dr. Eddie Joyce, and [facility] staff assigned to my case to perform the following operation and/or procedure at the [facility]:    Kenalog injection to bilateral Time:  ________ A. M.  P.M.        Patient Name:  ______________________________________________________  (please print)      Patient signature:  ___________________________________________________             Relationship to Patient:           []  Par